# Patient Record
Sex: FEMALE | Race: WHITE | NOT HISPANIC OR LATINO | Employment: OTHER | URBAN - METROPOLITAN AREA
[De-identification: names, ages, dates, MRNs, and addresses within clinical notes are randomized per-mention and may not be internally consistent; named-entity substitution may affect disease eponyms.]

---

## 2023-03-20 LAB
COBALAMIN (VITAMIN B12) (PG/ML) IN SER/PLAS: 330 PG/ML (ref 211–911)
FERRITIN (UG/LL) IN SER/PLAS: 32 UG/L (ref 8–150)
FOLATE (NG/ML) IN SER/PLAS: 6.2 NG/ML
IRON (UG/DL) IN SER/PLAS: 70 UG/DL (ref 35–150)
IRON BINDING CAPACITY (UG/DL) IN SER/PLAS: 339 UG/DL (ref 240–445)
IRON SATURATION (%) IN SER/PLAS: 21 % (ref 25–45)

## 2023-04-20 LAB
ALANINE AMINOTRANSFERASE (SGPT) (U/L) IN SER/PLAS: 16 U/L (ref 7–45)
ALBUMIN (G/DL) IN SER/PLAS: 3.9 G/DL (ref 3.4–5)
ALKALINE PHOSPHATASE (U/L) IN SER/PLAS: 105 U/L (ref 33–136)
ANION GAP IN SER/PLAS: 12 MMOL/L (ref 10–20)
ASPARTATE AMINOTRANSFERASE (SGOT) (U/L) IN SER/PLAS: 19 U/L (ref 9–39)
BILIRUBIN TOTAL (MG/DL) IN SER/PLAS: 0.5 MG/DL (ref 0–1.2)
CALCIUM (MG/DL) IN SER/PLAS: 9.3 MG/DL (ref 8.6–10.6)
CARBON DIOXIDE, TOTAL (MMOL/L) IN SER/PLAS: 25 MMOL/L (ref 21–32)
CHLORIDE (MMOL/L) IN SER/PLAS: 107 MMOL/L (ref 98–107)
CREATININE (MG/DL) IN SER/PLAS: 1.42 MG/DL (ref 0.5–1.05)
ESTIMATED AVERAGE GLUCOSE FOR HBA1C: 192 MG/DL
GFR FEMALE: 34 ML/MIN/1.73M2
GLUCOSE (MG/DL) IN SER/PLAS: 129 MG/DL (ref 74–99)
HEMOGLOBIN A1C/HEMOGLOBIN TOTAL IN BLOOD: 8.3 %
POTASSIUM (MMOL/L) IN SER/PLAS: 4.1 MMOL/L (ref 3.5–5.3)
PROTEIN TOTAL: 6.4 G/DL (ref 6.4–8.2)
SODIUM (MMOL/L) IN SER/PLAS: 140 MMOL/L (ref 136–145)
THYROTROPIN (MIU/L) IN SER/PLAS BY DETECTION LIMIT <= 0.05 MIU/L: 4.65 MIU/L (ref 0.44–3.98)
THYROXINE (T4) FREE (NG/DL) IN SER/PLAS: 1.22 NG/DL (ref 0.78–1.48)
UREA NITROGEN (MG/DL) IN SER/PLAS: 36 MG/DL (ref 6–23)

## 2023-06-17 LAB
ERYTHROCYTE DISTRIBUTION WIDTH (RATIO) BY AUTOMATED COUNT: 13.7 % (ref 11.5–14.5)
ERYTHROCYTE MEAN CORPUSCULAR HEMOGLOBIN CONCENTRATION (G/DL) BY AUTOMATED: 31.9 G/DL (ref 32–36)
ERYTHROCYTE MEAN CORPUSCULAR VOLUME (FL) BY AUTOMATED COUNT: 95 FL (ref 80–100)
ERYTHROCYTES (10*6/UL) IN BLOOD BY AUTOMATED COUNT: 3.45 X10E12/L (ref 4–5.2)
HEMATOCRIT (%) IN BLOOD BY AUTOMATED COUNT: 32.9 % (ref 36–46)
HEMOGLOBIN (G/DL) IN BLOOD: 10.5 G/DL (ref 12–16)
INR IN PPP BY COAGULATION ASSAY: 1.3 (ref 0.9–1.1)
LEUKOCYTES (10*3/UL) IN BLOOD BY AUTOMATED COUNT: 6.2 X10E9/L (ref 4.4–11.3)
NRBC (PER 100 WBCS) BY AUTOMATED COUNT: 0 /100 WBC (ref 0–0)
PLATELETS (10*3/UL) IN BLOOD AUTOMATED COUNT: 232 X10E9/L (ref 150–450)
PROTHROMBIN TIME (PT) IN PPP BY COAGULATION ASSAY: 15.5 SEC (ref 9.8–13.4)

## 2023-09-23 PROBLEM — B35.9 TINEA: Status: ACTIVE | Noted: 2023-09-23

## 2023-09-23 PROBLEM — N17.9 ACUTE RENAL FAILURE (CMS-HCC): Status: ACTIVE | Noted: 2023-09-23

## 2023-09-23 PROBLEM — R60.0 LOWER EXTREMITY EDEMA: Status: ACTIVE | Noted: 2023-09-23

## 2023-09-23 PROBLEM — M19.131 SLAC (SCAPHOLUNATE ADVANCED COLLAPSE) OF WRIST, RIGHT: Status: ACTIVE | Noted: 2023-09-23

## 2023-09-23 PROBLEM — L57.9 SKIN CHANGES DUE TO CHRONIC EXPOSURE TO NONIONIZING RADIATION, UNSPECIFIED: Status: ACTIVE | Noted: 2023-06-08

## 2023-09-23 PROBLEM — E03.8 ADULT ONSET HYPOTHYROIDISM: Status: ACTIVE | Noted: 2023-09-23

## 2023-09-23 PROBLEM — R42 DIZZINESS: Status: ACTIVE | Noted: 2023-09-23

## 2023-09-23 PROBLEM — K80.50 CHOLEDOCHOLITHIASIS: Status: ACTIVE | Noted: 2023-09-23

## 2023-09-23 PROBLEM — L57.0 ACTINIC KERATOSIS: Status: ACTIVE | Noted: 2023-06-08

## 2023-09-23 PROBLEM — R06.02 SHORTNESS OF BREATH: Status: ACTIVE | Noted: 2023-09-23

## 2023-09-23 PROBLEM — I10 HYPERTENSION: Status: ACTIVE | Noted: 2023-09-23

## 2023-09-23 PROBLEM — D18.01 HEMANGIOMA OF SKIN AND SUBCUTANEOUS TISSUE: Status: ACTIVE | Noted: 2023-06-08

## 2023-09-23 PROBLEM — M18.11 PRIMARY OSTEOARTHRITIS OF FIRST CARPOMETACARPAL JOINT OF RIGHT HAND: Status: ACTIVE | Noted: 2023-09-23

## 2023-09-23 PROBLEM — Z85.828 PERSONAL HISTORY OF OTHER MALIGNANT NEOPLASM OF SKIN: Status: ACTIVE | Noted: 2023-06-08

## 2023-09-23 PROBLEM — R31.9 HEMATURIA: Status: ACTIVE | Noted: 2023-09-23

## 2023-09-23 PROBLEM — I48.20 CHRONIC ATRIAL FIBRILLATION (MULTI): Status: ACTIVE | Noted: 2023-09-23

## 2023-09-23 PROBLEM — L40.9 PSORIASIS: Status: ACTIVE | Noted: 2023-09-23

## 2023-09-23 PROBLEM — N28.9 RENAL/URETERAL DISEASE: Status: ACTIVE | Noted: 2023-09-23

## 2023-09-23 PROBLEM — R60.9 EDEMA: Status: ACTIVE | Noted: 2023-09-23

## 2023-09-23 PROBLEM — I25.10 ATHEROSCLEROTIC HEART DISEASE OF NATIVE CORONARY ARTERY WITHOUT ANGINA PECTORIS: Status: ACTIVE | Noted: 2023-09-23

## 2023-09-23 PROBLEM — E78.5 HYPERLIPIDEMIA: Status: ACTIVE | Noted: 2023-09-23

## 2023-09-23 PROBLEM — R31.29 HEMATURIA, MICROSCOPIC: Status: ACTIVE | Noted: 2023-09-23

## 2023-09-23 PROBLEM — R35.89 POLYURIA: Status: ACTIVE | Noted: 2023-09-23

## 2023-09-23 PROBLEM — R53.1 WEAKNESS: Status: ACTIVE | Noted: 2023-09-23

## 2023-09-23 PROBLEM — I21.4 NSTEMI (NON-ST ELEVATED MYOCARDIAL INFARCTION) (MULTI): Status: ACTIVE | Noted: 2023-09-23

## 2023-09-23 PROBLEM — R73.09 ABNORMAL GLUCOSE: Status: ACTIVE | Noted: 2023-09-23

## 2023-09-23 PROBLEM — L82.1 OTHER SEBORRHEIC KERATOSIS: Status: ACTIVE | Noted: 2023-06-08

## 2023-09-23 PROBLEM — E87.5 HYPERKALEMIA: Status: ACTIVE | Noted: 2023-09-23

## 2023-09-23 PROBLEM — E11.9 DIABETES MELLITUS (MULTI): Status: ACTIVE | Noted: 2023-09-23

## 2023-09-23 RX ORDER — AMIODARONE HYDROCHLORIDE 200 MG/1
1 TABLET ORAL DAILY
COMMUNITY
Start: 2022-04-12 | End: 2023-10-24 | Stop reason: ALTCHOICE

## 2023-09-23 RX ORDER — CLOPIDOGREL BISULFATE 75 MG/1
1 TABLET ORAL DAILY
COMMUNITY
Start: 2016-12-20 | End: 2024-01-09 | Stop reason: SDUPTHER

## 2023-09-23 RX ORDER — IBUPROFEN 600 MG/1
600 TABLET ORAL EVERY 6 HOURS PRN
COMMUNITY
Start: 2022-05-09 | End: 2023-10-24 | Stop reason: ALTCHOICE

## 2023-09-23 RX ORDER — BLOOD SUGAR DIAGNOSTIC
STRIP MISCELLANEOUS 3 TIMES DAILY
COMMUNITY
Start: 2016-05-05

## 2023-09-23 RX ORDER — GLIMEPIRIDE 1 MG/1
1 TABLET ORAL DAILY
COMMUNITY
Start: 2022-03-01 | End: 2023-10-24 | Stop reason: ALTCHOICE

## 2023-09-23 RX ORDER — AMLODIPINE BESYLATE 2.5 MG/1
1 TABLET ORAL DAILY
COMMUNITY
Start: 2021-04-14 | End: 2024-01-09 | Stop reason: SDUPTHER

## 2023-09-23 RX ORDER — NAPROXEN SODIUM 220 MG/1
81 TABLET, FILM COATED ORAL DAILY
COMMUNITY
End: 2023-10-24 | Stop reason: ALTCHOICE

## 2023-09-23 RX ORDER — AMIODARONE HYDROCHLORIDE 100 MG/1
100 TABLET ORAL DAILY
COMMUNITY
End: 2024-01-09 | Stop reason: SDUPTHER

## 2023-09-23 RX ORDER — ACETAMINOPHEN 325 MG/1
650 TABLET ORAL EVERY 4 HOURS PRN
COMMUNITY
Start: 2022-04-12 | End: 2023-12-18 | Stop reason: ALTCHOICE

## 2023-09-23 RX ORDER — CHLORHEXIDINE GLUCONATE ORAL RINSE 1.2 MG/ML
15 SOLUTION DENTAL 2 TIMES DAILY
COMMUNITY
Start: 2022-05-03 | End: 2023-10-24 | Stop reason: ALTCHOICE

## 2023-09-23 RX ORDER — OXYCODONE AND ACETAMINOPHEN 5; 325 MG/1; MG/1
1 TABLET ORAL EVERY 6 HOURS PRN
COMMUNITY
Start: 2022-05-09 | End: 2023-10-24 | Stop reason: ALTCHOICE

## 2023-09-23 RX ORDER — LANCETS
EACH MISCELLANEOUS
Status: ON HOLD | COMMUNITY
End: 2023-11-17 | Stop reason: ENTERED-IN-ERROR

## 2023-09-23 RX ORDER — ATORVASTATIN CALCIUM 80 MG/1
1 TABLET, FILM COATED ORAL NIGHTLY
COMMUNITY
Start: 2016-12-20 | End: 2024-01-09 | Stop reason: SDUPTHER

## 2023-09-23 RX ORDER — METOPROLOL SUCCINATE 25 MG/1
0.5 TABLET, EXTENDED RELEASE ORAL EVERY EVENING
COMMUNITY
Start: 2021-04-15 | End: 2023-10-30

## 2023-09-23 RX ORDER — LOSARTAN POTASSIUM 100 MG/1
0.5 TABLET ORAL 2 TIMES DAILY
COMMUNITY
Start: 2016-07-05 | End: 2024-01-09 | Stop reason: SDUPTHER

## 2023-09-23 RX ORDER — NITROGLYCERIN 0.4 MG/1
0.4 TABLET SUBLINGUAL EVERY 5 MIN PRN
COMMUNITY
Start: 2019-06-17 | End: 2023-12-18 | Stop reason: SDUPTHER

## 2023-09-23 RX ORDER — PEN NEEDLE, DIABETIC 30 GX3/16"
NEEDLE, DISPOSABLE MISCELLANEOUS
Status: ON HOLD | COMMUNITY
End: 2023-11-17 | Stop reason: ENTERED-IN-ERROR

## 2023-09-23 RX ORDER — GLIMEPIRIDE 4 MG/1
1 TABLET ORAL DAILY
COMMUNITY
End: 2023-10-24 | Stop reason: ALTCHOICE

## 2023-09-23 RX ORDER — DOCUSATE SODIUM 100 MG/1
100 CAPSULE, LIQUID FILLED ORAL 2 TIMES DAILY
COMMUNITY
Start: 2022-04-20

## 2023-09-23 RX ORDER — ISOSORBIDE MONONITRATE 60 MG/1
1.5 TABLET, EXTENDED RELEASE ORAL DAILY
Status: ON HOLD | COMMUNITY
Start: 2018-08-21 | End: 2023-12-26 | Stop reason: SDUPTHER

## 2023-09-23 RX ORDER — INSULIN DEGLUDEC 100 U/ML
5 INJECTION, SOLUTION SUBCUTANEOUS NIGHTLY
COMMUNITY
Start: 2022-05-03

## 2023-09-23 RX ORDER — FUROSEMIDE 20 MG/1
20 TABLET ORAL DAILY
COMMUNITY
Start: 2021-06-15 | End: 2024-01-09 | Stop reason: SDUPTHER

## 2023-10-23 ENCOUNTER — TELEPHONE (OUTPATIENT)
Dept: CARDIOLOGY | Facility: HOSPITAL | Age: 88
End: 2023-10-23
Payer: MEDICARE

## 2023-10-24 ENCOUNTER — OFFICE VISIT (OUTPATIENT)
Dept: CARDIOLOGY | Facility: HOSPITAL | Age: 88
End: 2023-10-24
Payer: MEDICARE

## 2023-10-24 VITALS
HEART RATE: 58 BPM | SYSTOLIC BLOOD PRESSURE: 130 MMHG | WEIGHT: 115 LBS | DIASTOLIC BLOOD PRESSURE: 70 MMHG | BODY MASS INDEX: 20.7 KG/M2

## 2023-10-24 DIAGNOSIS — I48.20 CHRONIC ATRIAL FIBRILLATION (MULTI): Primary | ICD-10-CM

## 2023-10-24 LAB
ATRIAL RATE: 58 BPM
P AXIS: 76 DEGREES
P OFFSET: 203 MS
P ONSET: 140 MS
PR INTERVAL: 160 MS
Q ONSET: 220 MS
QRS COUNT: 9 BEATS
QRS DURATION: 110 MS
QT INTERVAL: 422 MS
QTC CALCULATION(BAZETT): 414 MS
QTC FREDERICIA: 417 MS
R AXIS: 71 DEGREES
T AXIS: 99 DEGREES
T OFFSET: 431 MS
VENTRICULAR RATE: 58 BPM

## 2023-10-24 PROCEDURE — 1126F AMNT PAIN NOTED NONE PRSNT: CPT | Performed by: INTERNAL MEDICINE

## 2023-10-24 PROCEDURE — 1036F TOBACCO NON-USER: CPT | Performed by: INTERNAL MEDICINE

## 2023-10-24 PROCEDURE — 1160F RVW MEDS BY RX/DR IN RCRD: CPT | Performed by: INTERNAL MEDICINE

## 2023-10-24 PROCEDURE — 99214 OFFICE O/P EST MOD 30 MIN: CPT | Performed by: INTERNAL MEDICINE

## 2023-10-24 PROCEDURE — 3075F SYST BP GE 130 - 139MM HG: CPT | Performed by: INTERNAL MEDICINE

## 2023-10-24 PROCEDURE — 93010 ELECTROCARDIOGRAM REPORT: CPT | Performed by: INTERNAL MEDICINE

## 2023-10-24 PROCEDURE — 99214 OFFICE O/P EST MOD 30 MIN: CPT | Mod: 25 | Performed by: INTERNAL MEDICINE

## 2023-10-24 PROCEDURE — 93005 ELECTROCARDIOGRAM TRACING: CPT | Performed by: INTERNAL MEDICINE

## 2023-10-24 PROCEDURE — 1159F MED LIST DOCD IN RCRD: CPT | Performed by: INTERNAL MEDICINE

## 2023-10-24 PROCEDURE — 3078F DIAST BP <80 MM HG: CPT | Performed by: INTERNAL MEDICINE

## 2023-10-24 ASSESSMENT — ENCOUNTER SYMPTOMS
LOSS OF SENSATION IN FEET: 0
OCCASIONAL FEELINGS OF UNSTEADINESS: 0
DEPRESSION: 0

## 2023-10-24 NOTE — PROGRESS NOTES
Subjective:  Shona returns for a routine 3-month follow-up.  She is a delightful 89-year-old female whom I have been following for quite some time.  She has known coronary disease and is status post very remote CABG surgery.  Repeat cath 7 years ago showed a patent sequential LIMA with an occluded SVG-RCA.  She has been managed medically without any overly problematic angina since that time.  She has also had paroxysmal atrial fibrillation and we have been maintaining sinus rhythm predominantly with amiodarone.  She remains appropriately anticoagulated.  She remains on clopidogrel as her antiplatelet agent.  She has had a very good past 3 months.  She actually stopped sleeping in a recliner and is actually going up to bed to sleep in her bed at night now.  She is doing paining some other very enjoyable activities that she had not been doing previously.  She overall is generally quite happy with how she is doing as is her supportive daughter.    Objective:  General: Alert, pleasant elderly female who looks as good as she has in quite some time.  HEENT: Unchanged.  Lungs: Clear without crackles or wheezing.  Cardiac: Normal S1 and S2 with 2 or 6 systolic murmur.  Abdomen: Nontender.  Extremities: No edema.  Skin: No rash.  Neuro: Intact and unchanged.    EKG: Sinus bradycardia.  Incomplete left bundle branch block.  Nonspecific T wave abnormality.  No acute changes.    Impression/plan:    Shona remains clinically stable at this time.  She remains free of angina at a tolerable activity level on her current medical regimen.  She is maintaining normal sinus rhythm with amiodarone and remains appropriately anticoagulated.  She remains euvolemic despite her moderately reduced ejection fraction with moderate mitral regurgitation.  Given her clinical stability I will back her visits off to every 6 months at this point.  I elected to embark on no other testing and will not make any medication changes.  She knows to call for any  intercurrent concerns before then.  Her daughter is also very involved in her care, and I am sure will let me know if there are any concerns before her next visit.      Patient instructions:    Continue current medications unchanged.    Return to clinic in 6 months.

## 2023-10-29 DIAGNOSIS — I25.10 ATHEROSCLEROTIC HEART DISEASE OF NATIVE CORONARY ARTERY WITHOUT ANGINA PECTORIS: ICD-10-CM

## 2023-10-30 DIAGNOSIS — I48.20 CHRONIC ATRIAL FIBRILLATION (MULTI): Primary | ICD-10-CM

## 2023-10-30 RX ORDER — METOPROLOL TARTRATE 25 MG/1
12.5 TABLET, FILM COATED ORAL 2 TIMES DAILY
Qty: 90 TABLET | Refills: 3 | Status: SHIPPED | OUTPATIENT
Start: 2023-10-30 | End: 2023-10-30

## 2023-10-30 RX ORDER — METOPROLOL SUCCINATE 25 MG/1
TABLET, EXTENDED RELEASE ORAL
Qty: 45 TABLET | Refills: 3 | Status: SHIPPED | OUTPATIENT
Start: 2023-10-30 | End: 2023-11-15 | Stop reason: SDUPTHER

## 2023-11-13 ENCOUNTER — TELEPHONE (OUTPATIENT)
Dept: CARDIOLOGY | Facility: HOSPITAL | Age: 88
End: 2023-11-13
Payer: MEDICARE

## 2023-11-15 DIAGNOSIS — I48.20 CHRONIC ATRIAL FIBRILLATION (MULTI): Primary | ICD-10-CM

## 2023-11-15 RX ORDER — METOPROLOL SUCCINATE 25 MG/1
TABLET, EXTENDED RELEASE ORAL
Qty: 45 TABLET | Refills: 3 | Status: SHIPPED | OUTPATIENT
Start: 2023-11-15 | End: 2024-01-09 | Stop reason: SDUPTHER

## 2023-11-15 NOTE — TELEPHONE ENCOUNTER
Appropriate ICD code, dose and frequency verified per previous physician notes.  (Metoprolol Succinate 12.5 mg daily) Refill re routed for Dr. Mnediola's approval.

## 2023-11-16 ENCOUNTER — TELEPHONE (OUTPATIENT)
Dept: CARDIOLOGY | Facility: HOSPITAL | Age: 88
End: 2023-11-16
Payer: MEDICARE

## 2023-11-16 ENCOUNTER — HOSPITAL ENCOUNTER (OUTPATIENT)
Facility: HOSPITAL | Age: 88
Setting detail: OBSERVATION
Discharge: HOME | End: 2023-11-17
Attending: EMERGENCY MEDICINE | Admitting: INTERNAL MEDICINE
Payer: MEDICARE

## 2023-11-16 ENCOUNTER — APPOINTMENT (OUTPATIENT)
Dept: RADIOLOGY | Facility: HOSPITAL | Age: 88
End: 2023-11-16
Payer: MEDICARE

## 2023-11-16 DIAGNOSIS — R07.9 CHEST PAIN, UNSPECIFIED TYPE: Primary | ICD-10-CM

## 2023-11-16 LAB
ALBUMIN SERPL BCP-MCNC: 4.2 G/DL (ref 3.4–5)
ALP SERPL-CCNC: 106 U/L (ref 33–136)
ALT SERPL W P-5'-P-CCNC: 21 U/L (ref 7–45)
ANION GAP SERPL CALC-SCNC: 13 MMOL/L (ref 10–20)
AST SERPL W P-5'-P-CCNC: 22 U/L (ref 9–39)
BASOPHILS # BLD AUTO: 0.03 X10*3/UL (ref 0–0.1)
BASOPHILS NFR BLD AUTO: 0.5 %
BILIRUB SERPL-MCNC: 0.7 MG/DL (ref 0–1.2)
BNP SERPL-MCNC: 378 PG/ML (ref 0–99)
BUN SERPL-MCNC: 33 MG/DL (ref 6–23)
CALCIUM SERPL-MCNC: 10 MG/DL (ref 8.6–10.3)
CARDIAC TROPONIN I PNL SERPL HS: 29 NG/L (ref 0–13)
CARDIAC TROPONIN I PNL SERPL HS: 29 NG/L (ref 0–13)
CHLORIDE SERPL-SCNC: 103 MMOL/L (ref 98–107)
CO2 SERPL-SCNC: 27 MMOL/L (ref 21–32)
CREAT SERPL-MCNC: 1.47 MG/DL (ref 0.5–1.05)
EOSINOPHIL # BLD AUTO: 0.09 X10*3/UL (ref 0–0.4)
EOSINOPHIL NFR BLD AUTO: 1.5 %
ERYTHROCYTE [DISTWIDTH] IN BLOOD BY AUTOMATED COUNT: 13.3 % (ref 11.5–14.5)
GFR SERPL CREATININE-BSD FRML MDRD: 33 ML/MIN/1.73M*2
GLUCOSE BLD MANUAL STRIP-MCNC: 184 MG/DL (ref 74–99)
GLUCOSE BLD MANUAL STRIP-MCNC: 360 MG/DL (ref 74–99)
GLUCOSE SERPL-MCNC: 130 MG/DL (ref 74–99)
HCT VFR BLD AUTO: 37.6 % (ref 36–46)
HGB BLD-MCNC: 12.1 G/DL (ref 12–16)
IMM GRANULOCYTES # BLD AUTO: 0.07 X10*3/UL (ref 0–0.5)
IMM GRANULOCYTES NFR BLD AUTO: 1.2 % (ref 0–0.9)
LYMPHOCYTES # BLD AUTO: 1.5 X10*3/UL (ref 0.8–3)
LYMPHOCYTES NFR BLD AUTO: 25.6 %
MAGNESIUM SERPL-MCNC: 2 MG/DL (ref 1.6–2.4)
MCH RBC QN AUTO: 30.2 PG (ref 26–34)
MCHC RBC AUTO-ENTMCNC: 32.2 G/DL (ref 32–36)
MCV RBC AUTO: 94 FL (ref 80–100)
MONOCYTES # BLD AUTO: 0.42 X10*3/UL (ref 0.05–0.8)
MONOCYTES NFR BLD AUTO: 7.2 %
NEUTROPHILS # BLD AUTO: 3.75 X10*3/UL (ref 1.6–5.5)
NEUTROPHILS NFR BLD AUTO: 64 %
NRBC BLD-RTO: 0 /100 WBCS (ref 0–0)
PLATELET # BLD AUTO: 226 X10*3/UL (ref 150–450)
POTASSIUM SERPL-SCNC: 4.1 MMOL/L (ref 3.5–5.3)
PROT SERPL-MCNC: 7.4 G/DL (ref 6.4–8.2)
RBC # BLD AUTO: 4.01 X10*6/UL (ref 4–5.2)
SODIUM SERPL-SCNC: 139 MMOL/L (ref 136–145)
WBC # BLD AUTO: 5.9 X10*3/UL (ref 4.4–11.3)

## 2023-11-16 PROCEDURE — 99285 EMERGENCY DEPT VISIT HI MDM: CPT | Mod: 25 | Performed by: EMERGENCY MEDICINE

## 2023-11-16 PROCEDURE — 71046 X-RAY EXAM CHEST 2 VIEWS: CPT | Performed by: RADIOLOGY

## 2023-11-16 PROCEDURE — 84484 ASSAY OF TROPONIN QUANT: CPT | Performed by: EMERGENCY MEDICINE

## 2023-11-16 PROCEDURE — 36415 COLL VENOUS BLD VENIPUNCTURE: CPT | Performed by: EMERGENCY MEDICINE

## 2023-11-16 PROCEDURE — 80053 COMPREHEN METABOLIC PANEL: CPT | Performed by: EMERGENCY MEDICINE

## 2023-11-16 PROCEDURE — 99222 1ST HOSP IP/OBS MODERATE 55: CPT | Performed by: PHYSICIAN ASSISTANT

## 2023-11-16 PROCEDURE — 2500000001 HC RX 250 WO HCPCS SELF ADMINISTERED DRUGS (ALT 637 FOR MEDICARE OP): Performed by: NURSE PRACTITIONER

## 2023-11-16 PROCEDURE — 85025 COMPLETE CBC W/AUTO DIFF WBC: CPT | Performed by: EMERGENCY MEDICINE

## 2023-11-16 PROCEDURE — 2500000002 HC RX 250 W HCPCS SELF ADMINISTERED DRUGS (ALT 637 FOR MEDICARE OP, ALT 636 FOR OP/ED): Performed by: PHYSICIAN ASSISTANT

## 2023-11-16 PROCEDURE — 83880 ASSAY OF NATRIURETIC PEPTIDE: CPT | Performed by: PHYSICIAN ASSISTANT

## 2023-11-16 PROCEDURE — 2500000001 HC RX 250 WO HCPCS SELF ADMINISTERED DRUGS (ALT 637 FOR MEDICARE OP): Performed by: PHYSICIAN ASSISTANT

## 2023-11-16 PROCEDURE — 82947 ASSAY GLUCOSE BLOOD QUANT: CPT | Mod: 59

## 2023-11-16 PROCEDURE — G0378 HOSPITAL OBSERVATION PER HR: HCPCS

## 2023-11-16 PROCEDURE — 83735 ASSAY OF MAGNESIUM: CPT | Performed by: EMERGENCY MEDICINE

## 2023-11-16 PROCEDURE — 71046 X-RAY EXAM CHEST 2 VIEWS: CPT

## 2023-11-16 RX ORDER — FUROSEMIDE 40 MG/1
20 TABLET ORAL DAILY
Status: DISCONTINUED | OUTPATIENT
Start: 2023-11-17 | End: 2023-11-17 | Stop reason: HOSPADM

## 2023-11-16 RX ORDER — ACETAMINOPHEN 160 MG/5ML
650 SOLUTION ORAL EVERY 4 HOURS PRN
Status: DISCONTINUED | OUTPATIENT
Start: 2023-11-16 | End: 2023-11-17 | Stop reason: HOSPADM

## 2023-11-16 RX ORDER — INSULIN LISPRO 100 [IU]/ML
0-5 INJECTION, SOLUTION INTRAVENOUS; SUBCUTANEOUS
Status: DISCONTINUED | OUTPATIENT
Start: 2023-11-17 | End: 2023-11-17 | Stop reason: HOSPADM

## 2023-11-16 RX ORDER — DEXTROSE MONOHYDRATE 100 MG/ML
0.3 INJECTION, SOLUTION INTRAVENOUS ONCE AS NEEDED
Status: DISCONTINUED | OUTPATIENT
Start: 2023-11-16 | End: 2023-11-17 | Stop reason: HOSPADM

## 2023-11-16 RX ORDER — ISOSORBIDE MONONITRATE 30 MG/1
90 TABLET, EXTENDED RELEASE ORAL DAILY
Status: DISCONTINUED | OUTPATIENT
Start: 2023-11-17 | End: 2023-11-17 | Stop reason: HOSPADM

## 2023-11-16 RX ORDER — DEXTROSE 50 % IN WATER (D50W) INTRAVENOUS SYRINGE
25
Status: DISCONTINUED | OUTPATIENT
Start: 2023-11-16 | End: 2023-11-17 | Stop reason: HOSPADM

## 2023-11-16 RX ORDER — POLYETHYLENE GLYCOL 3350 17 G/17G
17 POWDER, FOR SOLUTION ORAL DAILY
Status: DISCONTINUED | OUTPATIENT
Start: 2023-11-17 | End: 2023-11-17 | Stop reason: HOSPADM

## 2023-11-16 RX ORDER — ONDANSETRON HYDROCHLORIDE 2 MG/ML
4 INJECTION, SOLUTION INTRAVENOUS EVERY 8 HOURS PRN
Status: DISCONTINUED | OUTPATIENT
Start: 2023-11-16 | End: 2023-11-17 | Stop reason: HOSPADM

## 2023-11-16 RX ORDER — AMIODARONE HYDROCHLORIDE 200 MG/1
100 TABLET ORAL DAILY
Status: DISCONTINUED | OUTPATIENT
Start: 2023-11-17 | End: 2023-11-17 | Stop reason: HOSPADM

## 2023-11-16 RX ORDER — INSULIN DEGLUDEC 100 U/ML
6 INJECTION, SOLUTION SUBCUTANEOUS EVERY 24 HOURS
Status: DISCONTINUED | OUTPATIENT
Start: 2023-11-16 | End: 2023-11-16

## 2023-11-16 RX ORDER — PANTOPRAZOLE SODIUM 40 MG/1
40 TABLET, DELAYED RELEASE ORAL
Status: DISCONTINUED | OUTPATIENT
Start: 2023-11-17 | End: 2023-11-17 | Stop reason: HOSPADM

## 2023-11-16 RX ORDER — CLOPIDOGREL BISULFATE 75 MG/1
75 TABLET ORAL DAILY
Status: DISCONTINUED | OUTPATIENT
Start: 2023-11-17 | End: 2023-11-17 | Stop reason: HOSPADM

## 2023-11-16 RX ORDER — AMLODIPINE BESYLATE 2.5 MG/1
2.5 TABLET ORAL DAILY
Status: DISCONTINUED | OUTPATIENT
Start: 2023-11-17 | End: 2023-11-17 | Stop reason: HOSPADM

## 2023-11-16 RX ORDER — METOPROLOL SUCCINATE 25 MG/1
12.5 TABLET, EXTENDED RELEASE ORAL ONCE
Status: COMPLETED | OUTPATIENT
Start: 2023-11-16 | End: 2023-11-16

## 2023-11-16 RX ORDER — LOSARTAN POTASSIUM 50 MG/1
50 TABLET ORAL 2 TIMES DAILY
Status: DISCONTINUED | OUTPATIENT
Start: 2023-11-16 | End: 2023-11-17 | Stop reason: HOSPADM

## 2023-11-16 RX ORDER — PANTOPRAZOLE SODIUM 40 MG/10ML
40 INJECTION, POWDER, LYOPHILIZED, FOR SOLUTION INTRAVENOUS
Status: DISCONTINUED | OUTPATIENT
Start: 2023-11-17 | End: 2023-11-17 | Stop reason: HOSPADM

## 2023-11-16 RX ORDER — ATORVASTATIN CALCIUM 80 MG/1
80 TABLET, FILM COATED ORAL NIGHTLY
Status: DISCONTINUED | OUTPATIENT
Start: 2023-11-16 | End: 2023-11-17 | Stop reason: HOSPADM

## 2023-11-16 RX ORDER — ACETAMINOPHEN 325 MG/1
650 TABLET ORAL EVERY 4 HOURS PRN
Status: DISCONTINUED | OUTPATIENT
Start: 2023-11-16 | End: 2023-11-17 | Stop reason: HOSPADM

## 2023-11-16 RX ORDER — ACETAMINOPHEN 650 MG/1
650 SUPPOSITORY RECTAL EVERY 4 HOURS PRN
Status: DISCONTINUED | OUTPATIENT
Start: 2023-11-16 | End: 2023-11-17 | Stop reason: HOSPADM

## 2023-11-16 RX ORDER — ONDANSETRON 4 MG/1
4 TABLET, ORALLY DISINTEGRATING ORAL EVERY 8 HOURS PRN
Status: DISCONTINUED | OUTPATIENT
Start: 2023-11-16 | End: 2023-11-17 | Stop reason: HOSPADM

## 2023-11-16 RX ORDER — NITROGLYCERIN 0.4 MG/1
0.4 TABLET SUBLINGUAL EVERY 5 MIN PRN
Status: DISCONTINUED | OUTPATIENT
Start: 2023-11-16 | End: 2023-11-17 | Stop reason: HOSPADM

## 2023-11-16 RX ORDER — INSULIN GLARGINE 100 [IU]/ML
6 INJECTION, SOLUTION SUBCUTANEOUS NIGHTLY
Status: DISCONTINUED | OUTPATIENT
Start: 2023-11-16 | End: 2023-11-17 | Stop reason: HOSPADM

## 2023-11-16 RX ORDER — METOPROLOL SUCCINATE 25 MG/1
12.5 TABLET, EXTENDED RELEASE ORAL 2 TIMES DAILY
Status: DISCONTINUED | OUTPATIENT
Start: 2023-11-16 | End: 2023-11-17 | Stop reason: HOSPADM

## 2023-11-16 RX ORDER — PREDNISOLONE ACETATE 10 MG/ML
1 SUSPENSION/ DROPS OPHTHALMIC NIGHTLY
Status: DISCONTINUED | OUTPATIENT
Start: 2023-11-16 | End: 2023-11-17 | Stop reason: HOSPADM

## 2023-11-16 RX ADMIN — APIXABAN 2.5 MG: 5 TABLET, FILM COATED ORAL at 21:57

## 2023-11-16 RX ADMIN — METOPROLOL SUCCINATE 12.5 MG: 25 TABLET, EXTENDED RELEASE ORAL at 22:44

## 2023-11-16 RX ADMIN — ATORVASTATIN CALCIUM 80 MG: 80 TABLET, FILM COATED ORAL at 21:57

## 2023-11-16 RX ADMIN — INSULIN GLARGINE 6 UNITS: 100 INJECTION, SOLUTION SUBCUTANEOUS at 22:43

## 2023-11-16 RX ADMIN — PREDNISOLONE ACETATE 1 DROP: 10 SUSPENSION/ DROPS OPHTHALMIC at 22:43

## 2023-11-16 ASSESSMENT — ENCOUNTER SYMPTOMS
NUMBNESS: 1
CHILLS: 0
PHOTOPHOBIA: 1
CONFUSION: 0
HEADACHES: 0
VOMITING: 0
LIGHT-HEADEDNESS: 0
NAUSEA: 0
SORE THROAT: 0
PALPITATIONS: 0
SPEECH DIFFICULTY: 0
DIZZINESS: 0
FEVER: 0
SHORTNESS OF BREATH: 0
WHEEZING: 0
WEAKNESS: 0
CHEST TIGHTNESS: 0
AGITATION: 0

## 2023-11-16 ASSESSMENT — COLUMBIA-SUICIDE SEVERITY RATING SCALE - C-SSRS
2. HAVE YOU ACTUALLY HAD ANY THOUGHTS OF KILLING YOURSELF?: NO
6. HAVE YOU EVER DONE ANYTHING, STARTED TO DO ANYTHING, OR PREPARED TO DO ANYTHING TO END YOUR LIFE?: NO
1. IN THE PAST MONTH, HAVE YOU WISHED YOU WERE DEAD OR WISHED YOU COULD GO TO SLEEP AND NOT WAKE UP?: NO

## 2023-11-16 NOTE — ED PROVIDER NOTES
HPI   Chief Complaint   Patient presents with    Chest Pain     Went to eye doctor today to get right cataract removed.  While there to day @ around 130pm she developed chest pain and irregular HR.  Has had A-fib in the past.  Other than skipping her blood thinners (eliquis) today took all her normal medications. Her right eye is very dilated, she got the drop in her right eyue before coming to the ED.       HPI  Patient is a 89-year-old female with past medical history significant for an MI status post CABG in , atrial fibrillation currently on Eliquis and Plavix who presents emergency room for chest pain.  Patient was awaiting cataract surgery today when she developed chest pressure radiating to her left arm.  She did not take her nitroglycerin pills because they are  as she has not experienced pain in some time.  She has not undergone cardiac studies in a few years as well.  She states that at the time her heart rate was elevated and she had a blood pressure 189/72.  The pain lasted a little over an hour and resolved here in the emergency room without intervention.  She currently does not have any symptoms.  She did not take her Eliquis or Plavix today because of the surgery.  She denies any other symptoms including nausea, vomiting, diaphoresis, fever, chills, dizziness, lightheadedness or syncope.      PMHx: As above  PSHx: As above  FamilyHx: Denies pertinent  SocialHx: Denies  Allergies: Amoxicillin, tramadol  Medications: See Medication Reconciliation     ROS  As above but otherwise denies      Physical Exam    GENERAL: Awake and Alert, No Acute Distress  HEENT: AT/NC, PERRL, EOMI, Normal Oropharynx, No Signs of Dehydration  NECK: Normal Inspection, No JVD  CARDIOVASCULAR: RRR, No M/R/G  RESPIRATORY: CTA Bilaterally, No Wheezes, Rales or Rhonchi, Chest Wall Non-tender  ABDOMEN: Soft, non-tender abdomen, Normal Bowel Sounds, No Distention  BACK: No CVA Tenderness  SKIN: Normal Color, Warm, Dry, No  Rashes   EXTREMITIES: Non-Tender, Full ROM, No Pedal Edema  NEURO: A&O x 3, Normal Motor and Sensation, Normal Mood and Affect    Nursing Assessment and Vitals Reviewed    EKG showed a sinus rhythm at 72 bpm.  There is a sinus arrhythmia.  No ischemic ST changes.  There is right axis deviation.  No ischemic T wave inversions.    Medical Decision  Patient is seen and evaluated for chest discomfort in the setting of prior MI status post CABG remotely.  On arrival she is feeling improved and her pain has resolved after over an hour without intervention.  She is well-appearing and in no acute distress.  Lungs are clear and heart is regular rate and rhythm.  EKG without ischemic ST changes.  Work-up thus far shows an elevated BUN and creatinine similar to prior.  She has hyperglycemia without signs of acidosis.  Troponin is slightly elevated at 29.  Delta troponin currently pending.  Chest x-ray shows left basilar atelectasis or fibrosis with a possible associated small effusion.  Patient has remained in stable condition while in the ED.  She was initially tachycardic which resolved without intervention as well.  She is saturating well on room air and has no respiratory complaints.  Will admit for observation as well as cardiac evaluation.                                No data recorded                Patient History   Past Medical History:   Diagnosis Date    Atherosclerotic heart disease of native coronary artery without angina pectoris 04/20/2022    Atherosclerotic heart disease of native coronary artery without angina pectoris    Personal history of other diseases of the circulatory system 09/16/2013    History of hypertension     Past Surgical History:   Procedure Laterality Date    CORONARY ARTERY BYPASS GRAFT  08/08/2016    CABG    TOTAL HIP ARTHROPLASTY  08/28/2014    Total Hip Replacement     Family History   Problem Relation Name Age of Onset    No Known Problems Mother      No Known Problems Father        Social History     Tobacco Use    Smoking status: Never    Smokeless tobacco: Never   Substance Use Topics    Alcohol use: Not on file    Drug use: Not on file       Physical Exam   ED Triage Vitals [11/16/23 1442]   Temp Heart Rate Resp BP   36.9 °C (98.4 °F) (!) 112 18 (!) 167/91      SpO2 Temp Source Heart Rate Source Patient Position   97 % Tympanic -- --      BP Location FiO2 (%)     -- --       Physical Exam    ED Course & MDM        Medical Decision Making      Procedure  Procedures     Lexie Madrid MD  11/16/23 5418

## 2023-11-16 NOTE — H&P
History Of Present Illness  Shona Montgomery is a 89 y.o. female PMHx as below presenting with episode of chest pressure radiating down the left arm x 1.5 hours this morning. Patient was at ophthalmology office for cataract procedure when she noticed midsternal chest pressure that radiated down the right arm. Not associated with neck/jaw pain, sob, dizziness, nausea, diaphoresis. Denies tachycardia or palpitations. She reports that symptoms persisted for about 1.5 hours without changing, and are now resolved. She tells me that she was fasting today because of her procedure and had not eaten any breakfast or lunch. She was not drinking water today. She did take her morning blood pressure medications but has been holding eliquis and plavix for procedure. Daughters both at bedside add to history when appropriate.     PMH: HTN, longstanding persistent A-fib, hyperlipidemia, CKD stage III, anemia, CHF, type 2 diabetes, heart murmur, incomplete LBBB    Past Medical History  She has a past medical history of Atherosclerotic heart disease of native coronary artery without angina pectoris (04/20/2022) and Personal history of other diseases of the circulatory system (09/16/2013).    Surgical History  She has a past surgical history that includes Coronary artery bypass graft (08/08/2016) and Total hip arthroplasty (08/28/2014).     Social History  She reports that she has never smoked. She has never used smokeless tobacco. No history on file for alcohol use and drug use.    Family History  Family History   Problem Relation Name Age of Onset    No Known Problems Mother      No Known Problems Father          Allergies  Amoxicillin and Tramadol    Review of Systems   Constitutional:  Negative for chills and fever.   HENT:  Negative for congestion and sore throat.    Eyes:  Positive for photophobia (one eye dilated) and visual disturbance.   Respiratory:  Negative for chest tightness, shortness of breath and wheezing.     Cardiovascular:  Positive for chest pain. Negative for palpitations and leg swelling.   Gastrointestinal:  Negative for nausea and vomiting.   Neurological:  Positive for numbness (left arm). Negative for dizziness, syncope, speech difficulty, weakness, light-headedness and headaches.   Psychiatric/Behavioral:  Negative for agitation and confusion.         Physical Exam  Constitutional:       General: She is not in acute distress.     Appearance: She is not toxic-appearing.   HENT:      Head: Normocephalic and atraumatic.      Right Ear: External ear normal.      Left Ear: External ear normal.      Nose: Nose normal. No congestion.      Mouth/Throat:      Mouth: Mucous membranes are moist.      Pharynx: Oropharynx is clear.   Eyes:      General:         Right eye: No discharge.         Left eye: No discharge.      Conjunctiva/sclera: Conjunctivae normal.      Comments: Right pupil dilated, left pupil ERRL   Cardiovascular:      Rate and Rhythm: Normal rate and regular rhythm.      Heart sounds: No murmur heard.     No gallop.   Pulmonary:      Effort: Pulmonary effort is normal.      Breath sounds: No wheezing or rales.   Abdominal:      General: Abdomen is flat. Bowel sounds are normal.      Palpations: Abdomen is soft.      Tenderness: There is no abdominal tenderness. There is no guarding or rebound.   Musculoskeletal:         General: No swelling or tenderness. Normal range of motion.      Right lower leg: No edema.      Left lower leg: No edema.   Skin:     General: Skin is warm and dry.      Findings: No erythema or rash.   Neurological:      General: No focal deficit present.      Mental Status: She is alert.      Cranial Nerves: No cranial nerve deficit.      Motor: No weakness.   Psychiatric:         Mood and Affect: Mood normal.         Thought Content: Thought content normal.          Last Recorded Vitals  BP (!) 167/91   Pulse (!) 112   Temp 36.9 °C (98.4 °F) (Tympanic)   Resp 18   Wt 51.3 kg (113  lb)   SpO2 97%     Relevant Results  Results for orders placed or performed during the hospital encounter of 11/16/23 (from the past 24 hour(s))   CBC and Auto Differential   Result Value Ref Range    WBC 5.9 4.4 - 11.3 x10*3/uL    nRBC 0.0 0.0 - 0.0 /100 WBCs    RBC 4.01 4.00 - 5.20 x10*6/uL    Hemoglobin 12.1 12.0 - 16.0 g/dL    Hematocrit 37.6 36.0 - 46.0 %    MCV 94 80 - 100 fL    MCH 30.2 26.0 - 34.0 pg    MCHC 32.2 32.0 - 36.0 g/dL    RDW 13.3 11.5 - 14.5 %    Platelets 226 150 - 450 x10*3/uL    Neutrophils % 64.0 40.0 - 80.0 %    Immature Granulocytes %, Automated 1.2 (H) 0.0 - 0.9 %    Lymphocytes % 25.6 13.0 - 44.0 %    Monocytes % 7.2 2.0 - 10.0 %    Eosinophils % 1.5 0.0 - 6.0 %    Basophils % 0.5 0.0 - 2.0 %    Neutrophils Absolute 3.75 1.60 - 5.50 x10*3/uL    Immature Granulocytes Absolute, Automated 0.07 0.00 - 0.50 x10*3/uL    Lymphocytes Absolute 1.50 0.80 - 3.00 x10*3/uL    Monocytes Absolute 0.42 0.05 - 0.80 x10*3/uL    Eosinophils Absolute 0.09 0.00 - 0.40 x10*3/uL    Basophils Absolute 0.03 0.00 - 0.10 x10*3/uL   Comprehensive metabolic panel   Result Value Ref Range    Glucose 130 (H) 74 - 99 mg/dL    Sodium 139 136 - 145 mmol/L    Potassium 4.1 3.5 - 5.3 mmol/L    Chloride 103 98 - 107 mmol/L    Bicarbonate 27 21 - 32 mmol/L    Anion Gap 13 10 - 20 mmol/L    Urea Nitrogen 33 (H) 6 - 23 mg/dL    Creatinine 1.47 (H) 0.50 - 1.05 mg/dL    eGFR 33 (L) >60 mL/min/1.73m*2    Calcium 10.0 8.6 - 10.3 mg/dL    Albumin 4.2 3.4 - 5.0 g/dL    Alkaline Phosphatase 106 33 - 136 U/L    Total Protein 7.4 6.4 - 8.2 g/dL    AST 22 9 - 39 U/L    Bilirubin, Total 0.7 0.0 - 1.2 mg/dL    ALT 21 7 - 45 U/L   Magnesium   Result Value Ref Range    Magnesium 2.00 1.60 - 2.40 mg/dL   Troponin I, High Sensitivity   Result Value Ref Range    Troponin I, High Sensitivity 29 (H) 0 - 13 ng/L      XR chest 2 views    Result Date: 11/16/2023  Interpreted By:  Tima Ramos, STUDY: XR CHEST 2 VIEWS;  11/16/2023 4:48 pm    INDICATION: Signs/Symptoms:chest pain.   COMPARISON: 04/05/2022   ACCESSION NUMBER(S): [id number]   ORDERING CLINICIAN: CARRIE MORELAND   FINDINGS: CARDIOMEDIASTINAL SILHOUETTE AND VASCULATURE:   Cardiac size:  Within normal limits. Status post median sternotomy.   Aortic shadow:  Within normal limits.   Mediastinal contours: Within normal limits.   Pulmonary vasculature:  The central vasculature is unremarkable   LUNGS: Left basilar discoid atelectasis and/or fibrosis. There is slight blunting of the costophrenic angles posteriorly on the lateral projection also probably due to atelectasis or fibrosis although a small effusion is possible.   ABDOMEN AND OTHER FINDINGS: No remarkable upper abdominal findings.   BONES: No acute osseous changes.       1.  Left basilar atelectasis or fibrosis, possibly with a small effusion.   Signed by: Tima Ramos 11/16/2023 5:01 PM Dictation workstation:   [id number]    ECG 12 lead (Clinic Performed)    Result Date: 10/24/2023  Sinus bradycardia Incomplete left bundle branch block Nonspecific T wave abnormality Abnormal ECG Confirmed by Mohsen Mendiola ([id number]) on 10/24/2023 7:17:54 PM       Assessment/Plan     PMH: HTN, longstanding persistent A-fib, hyperlipidemia, CKD stage III, anemia, CHF, type 2 diabetes, heart murmur, incomplete LBBB    Active Problems:  There are no active Hospital Problems.  Chest pain   CAD s/p CABG  CHF  HTN  Paroxysmal/persistent afib  - EKG NSR with PACs, nonspecific T wave abnormalities lateral leads  - CXR left basilar atelectasis or fibrosis, possibly with a small effusion.  - trop 29, delta pending   - BNP pending   - mg 2.0  - K 4.1   - maintain mg >2 and K>4, replace as needed  - cbc - grossly wnl   - prn nitroglycerin (didn't have at home, needs new script)   - telemetry   - continue home meds    - lasix 20 mg Monday and Friday  - considering elevated trop, HEART score 6 will consult cardiology    - follows with   Marlena    CKD III  - Cr 1.47, at baseline  - monitor BMP    Dvt prophylaxis   - lovenox deferred, continue home eliquis and plavix   - scd/ambulate     Gi prophylaxis   - pantopraxole   - miralax     DC plan  - DC home when medically stable    Labs/Testing reviewed:   45 min spent on professional & overall care of this patient   NICOLE StallingsC

## 2023-11-17 ENCOUNTER — TELEPHONE (OUTPATIENT)
Dept: ENDOCRINOLOGY | Facility: CLINIC | Age: 88
End: 2023-11-17
Payer: MEDICARE

## 2023-11-17 VITALS
WEIGHT: 113 LBS | HEIGHT: 63 IN | TEMPERATURE: 97.4 F | RESPIRATION RATE: 18 BRPM | OXYGEN SATURATION: 96 % | HEART RATE: 50 BPM | BODY MASS INDEX: 20.02 KG/M2 | DIASTOLIC BLOOD PRESSURE: 54 MMHG | SYSTOLIC BLOOD PRESSURE: 139 MMHG

## 2023-11-17 LAB
ANION GAP SERPL CALC-SCNC: 12 MMOL/L (ref 10–20)
BUN SERPL-MCNC: 32 MG/DL (ref 6–23)
CALCIUM SERPL-MCNC: 8.8 MG/DL (ref 8.6–10.3)
CARDIAC TROPONIN I PNL SERPL HS: 32 NG/L (ref 0–13)
CHLORIDE SERPL-SCNC: 105 MMOL/L (ref 98–107)
CO2 SERPL-SCNC: 25 MMOL/L (ref 21–32)
CREAT SERPL-MCNC: 1.29 MG/DL (ref 0.5–1.05)
ERYTHROCYTE [DISTWIDTH] IN BLOOD BY AUTOMATED COUNT: 13.3 % (ref 11.5–14.5)
GFR SERPL CREATININE-BSD FRML MDRD: 39 ML/MIN/1.73M*2
GLUCOSE BLD MANUAL STRIP-MCNC: 143 MG/DL (ref 74–99)
GLUCOSE BLD MANUAL STRIP-MCNC: 156 MG/DL (ref 74–99)
GLUCOSE BLD MANUAL STRIP-MCNC: 157 MG/DL (ref 74–99)
GLUCOSE BLD MANUAL STRIP-MCNC: 410 MG/DL (ref 74–99)
GLUCOSE SERPL-MCNC: 185 MG/DL (ref 74–99)
HCT VFR BLD AUTO: 33.7 % (ref 36–46)
HGB BLD-MCNC: 10.9 G/DL (ref 12–16)
MAGNESIUM SERPL-MCNC: 2.2 MG/DL (ref 1.6–2.4)
MCH RBC QN AUTO: 29.9 PG (ref 26–34)
MCHC RBC AUTO-ENTMCNC: 32.3 G/DL (ref 32–36)
MCV RBC AUTO: 92 FL (ref 80–100)
NRBC BLD-RTO: 0 /100 WBCS (ref 0–0)
PLATELET # BLD AUTO: 204 X10*3/UL (ref 150–450)
POTASSIUM SERPL-SCNC: 3.9 MMOL/L (ref 3.5–5.3)
RBC # BLD AUTO: 3.65 X10*6/UL (ref 4–5.2)
SODIUM SERPL-SCNC: 138 MMOL/L (ref 136–145)
WBC # BLD AUTO: 4.8 X10*3/UL (ref 4.4–11.3)

## 2023-11-17 PROCEDURE — 99239 HOSP IP/OBS DSCHRG MGMT >30: CPT | Performed by: PHYSICIAN ASSISTANT

## 2023-11-17 PROCEDURE — 2500000002 HC RX 250 W HCPCS SELF ADMINISTERED DRUGS (ALT 637 FOR MEDICARE OP, ALT 636 FOR OP/ED): Performed by: PHYSICIAN ASSISTANT

## 2023-11-17 PROCEDURE — 99223 1ST HOSP IP/OBS HIGH 75: CPT | Performed by: STUDENT IN AN ORGANIZED HEALTH CARE EDUCATION/TRAINING PROGRAM

## 2023-11-17 PROCEDURE — 85027 COMPLETE CBC AUTOMATED: CPT | Performed by: PHYSICIAN ASSISTANT

## 2023-11-17 PROCEDURE — 80048 BASIC METABOLIC PNL TOTAL CA: CPT | Performed by: PHYSICIAN ASSISTANT

## 2023-11-17 PROCEDURE — 82947 ASSAY GLUCOSE BLOOD QUANT: CPT

## 2023-11-17 PROCEDURE — G0378 HOSPITAL OBSERVATION PER HR: HCPCS

## 2023-11-17 PROCEDURE — 36415 COLL VENOUS BLD VENIPUNCTURE: CPT | Performed by: PHYSICIAN ASSISTANT

## 2023-11-17 PROCEDURE — 84484 ASSAY OF TROPONIN QUANT: CPT | Performed by: PHYSICIAN ASSISTANT

## 2023-11-17 PROCEDURE — 2500000004 HC RX 250 GENERAL PHARMACY W/ HCPCS (ALT 636 FOR OP/ED): Performed by: NURSE PRACTITIONER

## 2023-11-17 PROCEDURE — 83735 ASSAY OF MAGNESIUM: CPT | Performed by: NURSE PRACTITIONER

## 2023-11-17 PROCEDURE — 2500000001 HC RX 250 WO HCPCS SELF ADMINISTERED DRUGS (ALT 637 FOR MEDICARE OP): Performed by: PHYSICIAN ASSISTANT

## 2023-11-17 RX ORDER — UBIDECARENONE 75 MG
500 CAPSULE ORAL DAILY
COMMUNITY

## 2023-11-17 RX ORDER — MOXIFLOXACIN 5 MG/ML
1 SOLUTION/ DROPS OPHTHALMIC 3 TIMES DAILY
COMMUNITY
Start: 2023-10-23 | End: 2023-12-18 | Stop reason: ALTCHOICE

## 2023-11-17 RX ORDER — POTASSIUM CHLORIDE 20 MEQ/1
20 TABLET, EXTENDED RELEASE ORAL ONCE
Status: COMPLETED | OUTPATIENT
Start: 2023-11-17 | End: 2023-11-17

## 2023-11-17 RX ORDER — TIMOLOL MALEATE 5 MG/ML
1 SOLUTION/ DROPS OPHTHALMIC 2 TIMES DAILY
COMMUNITY
Start: 2023-10-26 | End: 2024-01-09 | Stop reason: ALTCHOICE

## 2023-11-17 RX ADMIN — METOPROLOL SUCCINATE 12.5 MG: 25 TABLET, EXTENDED RELEASE ORAL at 09:01

## 2023-11-17 RX ADMIN — AMIODARONE HYDROCHLORIDE 100 MG: 200 TABLET ORAL at 09:01

## 2023-11-17 RX ADMIN — FUROSEMIDE 20 MG: 40 TABLET ORAL at 09:01

## 2023-11-17 RX ADMIN — INSULIN LISPRO 5 UNITS: 100 INJECTION, SOLUTION INTRAVENOUS; SUBCUTANEOUS at 12:31

## 2023-11-17 RX ADMIN — APIXABAN 2.5 MG: 5 TABLET, FILM COATED ORAL at 09:01

## 2023-11-17 RX ADMIN — POLYETHYLENE GLYCOL 3350 17 G: 17 POWDER, FOR SOLUTION ORAL at 09:01

## 2023-11-17 RX ADMIN — LOSARTAN POTASSIUM 50 MG: 50 TABLET, FILM COATED ORAL at 10:04

## 2023-11-17 RX ADMIN — AMLODIPINE BESYLATE 2.5 MG: 2.5 TABLET ORAL at 09:01

## 2023-11-17 RX ADMIN — CLOPIDOGREL BISULFATE 75 MG: 75 TABLET ORAL at 09:01

## 2023-11-17 RX ADMIN — POTASSIUM CHLORIDE 20 MEQ: 1500 TABLET, EXTENDED RELEASE ORAL at 09:01

## 2023-11-17 RX ADMIN — PANTOPRAZOLE SODIUM 40 MG: 40 TABLET, DELAYED RELEASE ORAL at 06:50

## 2023-11-17 SDOH — SOCIAL STABILITY: SOCIAL INSECURITY: ARE THERE ANY APPARENT SIGNS OF INJURIES/BEHAVIORS THAT COULD BE RELATED TO ABUSE/NEGLECT?: NO

## 2023-11-17 SDOH — SOCIAL STABILITY: SOCIAL INSECURITY: ABUSE: ADULT

## 2023-11-17 SDOH — SOCIAL STABILITY: SOCIAL INSECURITY: HAS ANYONE EVER THREATENED TO HURT YOUR FAMILY OR YOUR PETS?: NO

## 2023-11-17 SDOH — SOCIAL STABILITY: SOCIAL INSECURITY: DO YOU FEEL UNSAFE GOING BACK TO THE PLACE WHERE YOU ARE LIVING?: NO

## 2023-11-17 SDOH — SOCIAL STABILITY: SOCIAL INSECURITY: ARE YOU OR HAVE YOU BEEN THREATENED OR ABUSED PHYSICALLY, EMOTIONALLY, OR SEXUALLY BY ANYONE?: NO

## 2023-11-17 SDOH — SOCIAL STABILITY: SOCIAL INSECURITY: DO YOU FEEL ANYONE HAS EXPLOITED OR TAKEN ADVANTAGE OF YOU FINANCIALLY OR OF YOUR PERSONAL PROPERTY?: NO

## 2023-11-17 SDOH — SOCIAL STABILITY: SOCIAL INSECURITY: WERE YOU ABLE TO COMPLETE ALL THE BEHAVIORAL HEALTH SCREENINGS?: YES

## 2023-11-17 SDOH — SOCIAL STABILITY: SOCIAL INSECURITY: HAVE YOU HAD THOUGHTS OF HARMING ANYONE ELSE?: NO

## 2023-11-17 SDOH — SOCIAL STABILITY: SOCIAL INSECURITY: DOES ANYONE TRY TO KEEP YOU FROM HAVING/CONTACTING OTHER FRIENDS OR DOING THINGS OUTSIDE YOUR HOME?: NO

## 2023-11-17 ASSESSMENT — ACTIVITIES OF DAILY LIVING (ADL)
JUDGMENT_ADEQUATE_SAFELY_COMPLETE_DAILY_ACTIVITIES: YES
WALKS IN HOME: INDEPENDENT
LACK_OF_TRANSPORTATION: NO
BATHING: NEEDS ASSISTANCE
HEARING - LEFT EAR: FUNCTIONAL
HEARING - RIGHT EAR: FUNCTIONAL
LACK_OF_TRANSPORTATION: NO
ADEQUATE_TO_COMPLETE_ADL: YES
GROOMING: INDEPENDENT
TOILETING: INDEPENDENT
FEEDING YOURSELF: INDEPENDENT
DRESSING YOURSELF: INDEPENDENT
ASSISTIVE_DEVICE: DENTURES PARTIAL
PATIENT'S MEMORY ADEQUATE TO SAFELY COMPLETE DAILY ACTIVITIES?: YES

## 2023-11-17 ASSESSMENT — COGNITIVE AND FUNCTIONAL STATUS - GENERAL
MOBILITY SCORE: 24
DAILY ACTIVITIY SCORE: 24
PATIENT BASELINE BEDBOUND: NO

## 2023-11-17 ASSESSMENT — PATIENT HEALTH QUESTIONNAIRE - PHQ9
SUM OF ALL RESPONSES TO PHQ9 QUESTIONS 1 & 2: 0
2. FEELING DOWN, DEPRESSED OR HOPELESS: NOT AT ALL
SUM OF ALL RESPONSES TO PHQ9 QUESTIONS 1 & 2: 0
2. FEELING DOWN, DEPRESSED OR HOPELESS: NOT AT ALL
1. LITTLE INTEREST OR PLEASURE IN DOING THINGS: NOT AT ALL
1. LITTLE INTEREST OR PLEASURE IN DOING THINGS: NOT AT ALL

## 2023-11-17 ASSESSMENT — LIFESTYLE VARIABLES
HOW MANY STANDARD DRINKS CONTAINING ALCOHOL DO YOU HAVE ON A TYPICAL DAY: PATIENT DOES NOT DRINK
PRESCIPTION_ABUSE_PAST_12_MONTHS: NO
AUDIT-C TOTAL SCORE: 0
SUBSTANCE_ABUSE_PAST_12_MONTHS: NO
HOW OFTEN DO YOU HAVE 6 OR MORE DRINKS ON ONE OCCASION: NEVER
AUDIT-C TOTAL SCORE: 0
SKIP TO QUESTIONS 9-10: 1
SUBSTANCE_ABUSE_PAST_12_MONTHS: NO
HOW MANY STANDARD DRINKS CONTAINING ALCOHOL DO YOU HAVE ON A TYPICAL DAY: PATIENT DOES NOT DRINK
AUDIT-C TOTAL SCORE: 0
SKIP TO QUESTIONS 9-10: 1
HOW OFTEN DO YOU HAVE A DRINK CONTAINING ALCOHOL: NEVER
AUDIT-C TOTAL SCORE: 0
HOW OFTEN DO YOU HAVE 6 OR MORE DRINKS ON ONE OCCASION: NEVER
HOW OFTEN DO YOU HAVE A DRINK CONTAINING ALCOHOL: NEVER
PRESCIPTION_ABUSE_PAST_12_MONTHS: NO

## 2023-11-17 ASSESSMENT — PAIN SCALES - PAIN ASSESSMENT IN ADVANCED DEMENTIA (PAINAD)
CONSOLABILITY: NO NEED TO CONSOLE
TOTALSCORE: 0
BODYLANGUAGE: RELAXED
FACIALEXPRESSION: SMILING OR INEXPRESSIVE
BREATHING: NORMAL

## 2023-11-17 ASSESSMENT — PAIN SCALES - GENERAL: PAINLEVEL_OUTOF10: 0 - NO PAIN

## 2023-11-17 ASSESSMENT — PAIN SCALES - WONG BAKER: WONGBAKER_NUMERICALRESPONSE: NO HURT

## 2023-11-17 NOTE — DISCHARGE SUMMARY
"Discharge Diagnosis  Chest pain    Issues Requiring Follow-Up  CAD s/p CABG  Paroxysmal A.fib       Discharge Meds     Your medication list        CONTINUE taking these medications        Instructions Last Dose Given Next Dose Due   Accu-Chek Fastclix Lancet Drum misc  Generic drug: lancets           BD Skye 2nd Gen Pen Needle 32 gauge x 5/32\" needle  Generic drug: pen needle, diabetic                  ASK your doctor about these medications        Instructions Last Dose Given Next Dose Due   acetaminophen 325 mg tablet  Commonly known as: Tylenol           amiodarone 100 mg tablet  Commonly known as: Pacerone           amLODIPine 2.5 mg tablet  Commonly known as: Norvasc           atorvastatin 80 mg tablet  Commonly known as: Lipitor           clopidogrel 75 mg tablet  Commonly known as: Plavix           Colace 100 mg capsule  Generic drug: docusate sodium           cyanocobalamin 500 mcg tablet  Commonly known as: Vitamin B-12           Eliquis 2.5 mg tablet  Generic drug: apixaban           furosemide 20 mg tablet  Commonly known as: Lasix           isosorbide mononitrate ER 60 mg 24 hr tablet  Commonly known as: Imdur           losartan 100 mg tablet  Commonly known as: Cozaar           metoprolol succinate XL 25 mg 24 hr tablet  Commonly known as: Toprol-XL      1/2 tablet by mouth daily       nitroglycerin 0.4 mg SL tablet  Commonly known as: Nitrostat           OneTouch Ultra Test strip  Generic drug: blood sugar diagnostic           Tresiba FlexTouch U-100 100 unit/mL (3 mL) injection  Generic drug: insulin degludec                    Test Results Pending At Discharge  Pending Labs       No current pending labs.            Hospital Course   Patient remained HDS throughout hospital course. Chest pain resolved prior to arrival at hospital. EKG was unchanged from previous. Troponin 29/32. Cr improved from 1.47 to 1.29. Telemetry reviewed. Patient was evaluated by cardiology and after discussion, determined " stable and appropriate for discharge without further cardiac testing at this time. Patient to resume home medications as prescribed. Follow up with PCP 5-7 days. Follow up with cardiology Dr. Mendiola as scheduled. All questions answered. Patient and family at bedside in agreement with plan of care.     Chest pain   CAD s/p CABG  CHF  HTN  Paroxysmal/persistent afib  - EKG NSR with PACs, nonspecific T wave abnormalities lateral leads  - CXR left basilar atelectasis or fibrosis, possibly with a small effusion.  - trop 29, 32  - BNP pending   - mg 2.0  - K 4.1   - maintain mg >2 and K>4, replace as needed  - cbc - grossly wnl   - prn nitroglycerin (didn't have at home, needs new script)   - telemetry   - continue home meds    - lasix 20 mg Monday and Friday  - considering elevated trop, HEART score 6 will consult cardiology    - follows with Dr. Mendiola     CKD III  - Cr 1.47, at baseline  - monitor BMP     Dvt prophylaxis   - lovenox deferred, continue home eliquis and plavix   - scd/ambulate      Gi prophylaxis   - pantopraxole   - miralax      DC plan  - DC home when medically stable     Labs/Testing imaging results reviewed with Dr. Ybarra      Pertinent Physical Exam At Time of Discharge  Physical Exam  Constitutional:       General: She is not in acute distress.     Appearance: She is not toxic-appearing.   HENT:      Head: Normocephalic and atraumatic.      Right Ear: External ear normal.      Left Ear: External ear normal.      Nose: Nose normal. No congestion.      Mouth/Throat:      Mouth: Mucous membranes are moist.      Pharynx: Oropharynx is clear.   Eyes:      General:         Right eye: No discharge.         Left eye: No discharge.      Conjunctiva/sclera: Conjunctivae normal.      Pupils: Pupils are equal, round, and reactive to light.   Cardiovascular:      Rate and Rhythm: Normal rate and regular rhythm.      Heart sounds: No murmur heard.     No gallop.   Pulmonary:      Effort: Pulmonary  effort is normal.      Breath sounds: No wheezing or rales.   Abdominal:      General: Abdomen is flat. Bowel sounds are normal.      Palpations: Abdomen is soft.      Tenderness: There is no abdominal tenderness. There is no guarding or rebound.   Musculoskeletal:         General: No swelling or tenderness. Normal range of motion.      Right lower leg: No edema.      Left lower leg: No edema.   Skin:     General: Skin is warm and dry.      Findings: No erythema or rash.   Neurological:      General: No focal deficit present.      Mental Status: She is alert.      Cranial Nerves: No cranial nerve deficit.      Motor: No weakness.   Psychiatric:         Mood and Affect: Mood normal.         Thought Content: Thought content normal.         Outpatient Follow-Up  Future Appointments   Date Time Provider Department Center   2/15/2024  4:00 PM Martha Domingo MD [id number] [facility]   4/30/2024  3:00 PM Mohsen Mendiola MD [id number] [facility]   6/5/2024  2:00 PM Lucas Camacho MD [id number]         Ramone Car PA-C

## 2023-11-17 NOTE — CARE PLAN
The patient's goals for the shift include no chest pain    The clinical goals for the shift include hemodynamically stable, no chest pain    Over the shift, the patient did  make progress toward the following goals.

## 2023-11-17 NOTE — PROGRESS NOTES
11/17/23 0924   Discharge Planning   Living Arrangements Alone   Support Systems Children   Assistance Needed None   Type of Residence Private residence   Number of Stairs to Enter Residence 3   Do you have animals or pets at home? No   Home or Post Acute Services None   Patient expects to be discharged to: Home   Does the patient need discharge transport arranged? No   Financial Resource Strain   How hard is it for you to pay for the very basics like food, housing, medical care, and heating? Not hard   Housing Stability   In the last 12 months, was there a time when you were not able to pay the mortgage or rent on time? N   In the last 12 months, how many places have you lived? 1   In the last 12 months, was there a time when you did not have a steady place to sleep or slept in a shelter (including now)? N   Transportation Needs   In the past 12 months, has lack of transportation kept you from medical appointments or from getting medications? no   In the past 12 months, has lack of transportation kept you from meetings, work, or from getting things needed for daily living? No

## 2023-11-17 NOTE — PROGRESS NOTES
Transitional Care Coordinator Note:  Met with patient and her son-in-law at the bedside to discuss discharge plans.  Patient lives alone except family stays with her at night.  Family transports to appointments.  Cardiology consulted for HEART score of 6.  Family will transport home when medically cleared.  No home care needs.  Shonda Joiner RN

## 2023-11-17 NOTE — CONSULTS
Wound Care Consult     Visit Date: 11/17/2023      Patient Name: Shona Montgomery         MRN: [id number]           YOB: 1929     Reason for Consult: Assessment complete. Biopsy site. Patient has follow up appointment scheduled with dermatology.            Pertinent Labs:   Albumin   Date Value Ref Range Status   11/16/2023 4.2 3.4 - 5.0 g/dL Final       Wound Assessment:  Wound 11/17/23 Other (comment) Arm Dorsal;Left;Lower (Active)   Wound Image   11/17/23 1037   Site Assessment Pink 11/17/23 1037   Pina-Wound Assessment Intact 11/17/23 1037   Non-staged Wound Description Full thickness 11/17/23 1037   Shape Round 11/17/23 1037   Wound Length (cm) 0.6 cm 11/17/23 1037   Wound Width (cm) 0.8 cm 11/17/23 1037   Wound Surface Area (cm^2) 0.48 cm^2 11/17/23 1037       Wound Team Summary Assessment: Notified Ramone SORTO of wound care recommendations     Left UE  Irrigate with normal saline or wound cleanser, Pat dry.  Apply no sting skin barrier (cavilon) to pina wound   Apply single layer of adaptic/vaseline gauze dressing  Cover with Mepilex border dressing  Change every day and as needed.          Wound Team Plan: Please re-consult wound/ostomy care for any changes or concerns     While in bed patient should only be on one fitted sheet, and one chux. Please, do not use brief while patient is resting in bed. Elevate heels off the bed surface at all times. Turn and reposition at least every 2 hours.     Thank you for this consultations, while inpatient please contact with any questions or changes in condition.       Joan Carbajal RN BSN,[facility],CWOCN  [phone]   11/17/2023  3:56 PM

## 2023-11-17 NOTE — CONSULTS
"Inpatient consult to Cardiology  Consult performed by: Torin CHIANG MD  Consult ordered by: Ramone Car PA-C  Reason for consult: chest pain        History Of Present Illness:    Shona Montgomery is a 89 y.o. female with past medical history of remote CABG, repeat cath 7 years ago showed a patent sequential LIMA with an occluded SVG-RCA, parosysmal atrial fibrillation maintaining NSR on amiodarone and on Eliquis for anticoagulation.  Cardiology consulted for chest pain.      States she was at the ophthalmology clinic for cataract surgery.  She did not take her medications in the morning as she was instructed by surgery team. States her surgery was delayed until finally at 2pm, she noticed some chest pressure,  and surgery was cancelled and she was sent to ED for evaluation of chest pain. Pain was substernal, 4/10 and pressure like in nature, resolved on its own.  States she did used to have frequent angina, but none since ~ 2020 when Dr. Mendiola found her \"perfect cocktail\" and now only very rarely has angina pain.  She feels completely back to baseline, hoping to go home.  Denies any shortness of breath, or orthopnea.  No lower extremity swelling.  No palpitations or syncope.  No headaches or falls.    Home cardiac regimen: amiodarone 100mg daily, amlodipine 2.5mg daily, Eliquis 2.5mg BID, atorvastatin, plavix, furosemide 20mg M W F, Imdur 90mg daily, losartan 50mg daily, and Toprol 12.5mg daily.    Her son in law is here today at bedside.     Follows with Dr. Mendiola for cardiology, last visit 10/2023    Past Cardiology Tests (Last 3 Years):  EKG:  Results for orders placed in visit on 10/24/23    ECG 12 lead (Clinic Performed)    Narrative  Sinus bradycardia  Incomplete left bundle branch block  Nonspecific T wave abnormality  Abnormal ECG  Confirmed by Mohsen Mendiola ([id number]) on 10/24/2023 7:17:54 PM    Echo:  CONCLUSIONS:   1. The left ventricular systolic function is moderately decreased with " a 35-40% estimated ejection fraction.   2. Spectral Doppler shows an impaired relaxation pattern of left ventricular diastolic filling.   3. The left atrium is severely dilated.   4. Moderate mitral valve regurgitation.   5. Mildly elevated RVSP.    Cath:  See above    Stress Test:  No results found for this or any previous visit.    Cardiac Imaging:  No results found for this or any previous visit.      Past Medical History:  She has a past medical history of Atherosclerotic heart disease of native coronary artery without angina pectoris (2022) and Personal history of other diseases of the circulatory system (2013).    Past Surgical History:  She has a past surgical history that includes Coronary artery bypass graft (2016) and Total hip arthroplasty (2014).      Social History:  She reports that she has never smoked. She has never used smokeless tobacco. No history on file for alcohol use and drug use.    Family History:  Family History   Problem Relation Name Age of Onset    No Known Problems Mother      No Known Problems Father          Allergies:  Amoxicillin and Tramadol    ROS:  10 point review of systems including (Constitutional, Eyes, ENMT, Respiratory, Cardiac, Gastrointestinal, Neurological, Psychiatric, and Hematologic) was performed and is otherwise negative.    Objective Data:  Last Recorded Vitals:  Vitals:    23 2122 23 2334 23 0549 23 0736   BP: 147/64 137/78 155/81 (!) 184/74   BP Location:   Right arm Left arm   Patient Position:   Lying Lying   Pulse: 77 89 53 54   Resp: 18 18 18 18   Temp:   36.7 °C (98.1 °F) 36.3 °C (97.4 °F)   TempSrc:    Oral   SpO2: 98% 97% 96% 97%   Weight:       Height:         Medical Gas Therapy: None (Room air)  Weight  Av.3 kg (113 lb)  Min: 51.3 kg (113 lb)  Max: 51.3 kg (113 lb)      LABS:  CMP:  Results from last 7 days   Lab Units 23  0625 23  1549   SODIUM mmol/L 138 139   POTASSIUM mmol/L 3.9 4.1  "  CHLORIDE mmol/L 105 103   CO2 mmol/L 25 27   ANION GAP mmol/L 12 13   BUN mg/dL 32* 33*   CREATININE mg/dL 1.29* 1.47*   EGFR mL/min/1.73m*2 39* 33*   MAGNESIUM mg/dL  --  2.00   ALBUMIN g/dL  --  4.2   ALT U/L  --  21   AST U/L  --  22   BILIRUBIN TOTAL mg/dL  --  0.7     CBC:  Results from last 7 days   Lab Units 11/17/23  0624 11/16/23  1549   WBC AUTO x10*3/uL 4.8 5.9   HEMOGLOBIN g/dL 10.9* 12.1   HEMATOCRIT % 33.7* 37.6   PLATELETS AUTO x10*3/uL 204 226   MCV fL 92 94     COAG:     ABO: No results found for: \"ABO\"  HEME/ENDO:     CARDIAC:   Results from last 7 days   Lab Units 11/17/23  0625 11/16/23  1815 11/16/23  1549   TROPHS ng/L 32* 29* 29*   BNP pg/mL  --   --  378*             Last I/O:  No intake or output data in the 24 hours ending 11/17/23 0918  Net IO Since Admission: No IO data has been entered for this period [11/17/23 0918]      Imaging Results:  XR chest 2 views    Result Date: 11/16/2023  Interpreted By:  Tima Ramos, STUDY: XR CHEST 2 VIEWS;  11/16/2023 4:48 pm   INDICATION: Signs/Symptoms:chest pain.   COMPARISON: 04/05/2022   ACCESSION NUMBER(S): [id number]   ORDERING CLINICIAN: CARRIE MORELAND   FINDINGS: CARDIOMEDIASTINAL SILHOUETTE AND VASCULATURE:   Cardiac size:  Within normal limits. Status post median sternotomy.   Aortic shadow:  Within normal limits.   Mediastinal contours: Within normal limits.   Pulmonary vasculature:  The central vasculature is unremarkable   LUNGS: Left basilar discoid atelectasis and/or fibrosis. There is slight blunting of the costophrenic angles posteriorly on the lateral projection also probably due to atelectasis or fibrosis although a small effusion is possible.   ABDOMEN AND OTHER FINDINGS: No remarkable upper abdominal findings.   BONES: No acute osseous changes.       1.  Left basilar atelectasis or fibrosis, possibly with a small effusion.   Signed by: Tima Ramos 11/16/2023 5:01 PM Dictation workstation:   " [id number]      Inpatient Medications:  Scheduled medications   Medication Dose Route Frequency    amiodarone  100 mg oral Daily    amLODIPine  2.5 mg oral Daily    apixaban  2.5 mg oral BID    atorvastatin  80 mg oral Nightly    clopidogrel  75 mg oral Daily    furosemide  20 mg oral Daily    insulin glargine  6 Units subcutaneous Nightly    insulin lispro  0-5 Units subcutaneous TID with meals    [Held by provider] isosorbide mononitrate ER  90 mg oral Daily    [Held by provider] losartan  50 mg oral BID    metoprolol succinate XL  12.5 mg oral BID    pantoprazole  40 mg oral Daily before breakfast    Or    pantoprazole  40 mg intravenous Daily before breakfast    polyethylene glycol  17 g oral Daily    prednisoLONE acetate  1 drop Left Eye Nightly     PRN medications   Medication    acetaminophen    Or    acetaminophen    Or    acetaminophen    dextrose 10 % in water (D10W)    dextrose 10 % in water (D10W)    dextrose    dextrose    glucagon    glucagon    nitroglycerin    ondansetron ODT    Or    ondansetron     Continuous Medications   Medication Dose Last Rate       Outpatient Medications:  Current Outpatient Medications   Medication Instructions    acetaminophen (TYLENOL) 650 mg, oral, Every 4 hours PRN    amiodarone (PACERONE) 100 mg, oral, Daily    amLODIPine (Norvasc) 2.5 mg tablet 1 tablet, oral, Daily    apixaban (Eliquis) 2.5 mg tablet 1 tablet, oral, 2 times daily    atorvastatin (Lipitor) 80 mg tablet 1 tablet, oral, Nightly    clopidogrel (Plavix) 75 mg tablet 1 tablet, oral, Daily    cyanocobalamin (VITAMIN B-12) 500 mcg, oral, Daily    docusate sodium (COLACE) 100 mg, oral, Daily    furosemide (LASIX) 20 mg, oral, Daily, On Mondays Wednesday and Friday    insulin degludec (TRESIBA FLEXTOUCH U-100) 5 Units, subcutaneous, Nightly    isosorbide mononitrate ER (Imdur) 60 mg 24 hr tablet 1.5 tablets, oral, Daily    lancets (Accu-Chek Fastclix Lancet Drum) misc miscellaneous    losartan (Cozaar) 100 mg  "tablet 0.5 tablets, oral, 2 times daily    metoprolol succinate XL (Toprol-XL) 25 mg 24 hr tablet 1/2 tablet by mouth daily    nitroglycerin (NITROSTAT) 0.4 mg, sublingual, Every 5 min PRN    OneTouch Ultra Test strip 3 times daily    pen needle, diabetic (BD Skye 2nd Gen Pen Needle) 32 gauge x 5/32\" needle miscellaneous       Physical Exam:  General:  Patient is awake, alert, and oriented.  Patient is in no acute distress.  HEENT:  Pupils equal and reactive.  Normocephalic.  Moist mucosa.    Neck:  No thyromegaly.  Normal Jugular Venous Pressure.  Cardiovascular:  Regular rate and rhythm.  Normal S1 and S2.  Pulmonary:  Clear to auscultation bilaterally.  Abdomen:  Soft. Non-tender.   Non-distended.  Positive bowel sounds.  Lower Extremities:  2+ pedal pulses. No LE edema.  Neurologic:  Cranial nerves intact.  No focal deficit.   Skin: Skin warm and dry, normal skin turgor.   Psychiatric: Normal affect.     Assessment/Plan   Shona Montgomery is a 89 y.o. female with past medical history of remote CABG, repeat cath 7 years ago showed a patent sequential LIMA with an occluded SVG-RCA, parosysmal atrial fibrillation maintaining NSR on amiodarone and on Eliquis for anticoagulation.  Cardiology consulted for chest pain.      EKG showing NSR with incomplete LBBB similar to previous without any acute changes.  HS troponin 29.  Creatinine stable 1.4.  H/h stable, no leukocytosis.  Normotensive, hemodynamically stable, chest pain free, appears euvolemic.  There are no significant clinical signs / symptoms or ischemic changes consistent with ACS.     Assessment:  CAD, s/p remote CABG with episode of stable angina  Hypertension    Plan:  Long discussion with patient and her son in law at bedside.  Patient is back to baseline and very happy with her current home regimen including Imdur.  They both prefer to forgo any further testing at this time and hoping to continue current medication regimen without changes, as this has worked " well for years.  Patient states she had a very stressful week with Dr. Kemp, and helping a neighbor, coupled with holding morning meds with planned cataract surgery.     Continue home cardiac regimen: amiodarone 100mg daily, amlodipine 2.5mg daily, Eliquis 2.5mg BID, atorvastatin, plavix, furosemide 20mg M W F, Imdur 90mg daily, losartan 50mg daily, and Toprol 12.5mg daily.    For further surgeries and procedures, we would recommend not holding amlodipine or Imdur.     Follow up with Dr. Mendiola within 1 month of hospital discharge.  Ok to discharge home, at the discretion of the primary team, once seen by Dr. Sanchez from Cardiology.     Code Status:  Full Code   Thank you for allowing me to participate in their care.  Please feel free to call me with any further questions or concerns.    Torin Sanchez MD, [facility], [facility]  Division of Cardiovascular Medicine  Medical Director, [facility] [place]  [facility]  Assistant Clinical Professor, Medicine  [employer] [facility] [employer] [facility] [employer]  [email or web address]  Office:  [phone]     Both the AYALA and I have had a face to face encounter with the patient today. I have examined the patient and edited the documented physical examination as necessary.  I personally reviewed the patient's vital signs, telemetry, recent labs, medications, orders, EKGs, and pertinent cardiac imaging/ echocardiography.  I have reviewed the AYALA's encounter note, approve the AYALA's documentation and have edited the note to reflect my diagnostic and therapeutic plan.

## 2023-11-17 NOTE — NURSING NOTE
1745-Discharge instructions and follow up appointments reviewed with patient and family both patient and family verbalize understanding of all discharge instructions and follow up appointments.

## 2023-11-17 NOTE — PROGRESS NOTES
Pharmacy Medication History Review    Shona Montgomery is a 89 y.o. female admitted for Chest pain. Pharmacy reviewed the patient's silyk-iy-wqghdmkty medications and allergies for accuracy.    The list below reflectives the updated PTA list. Please review each medication in order reconciliation for additional clarification and justification.  Prior to Admission Medications   Prescriptions Last Dose Informant Patient Reported? Taking?      Yes No         Yes No   .   amLODIPine (Norvasc) 2.5 mg tablet 2023 at 33541  Yes No   Sig: Take 1 tablet (2.5 mg) by mouth once daily.   amiodarone (Pacerone) 100 mg tablet 2023 at 0900  Yes No   Sig: Take 1 tablet (100 mg) by mouth once daily.   apixaban (Eliquis) 2.5 mg tablet 2023 at 2100  Yes No   Sig: Take 1 tablet (2.5 mg) by mouth 2 times a day.   atorvastatin (Lipitor) 80 mg tablet 2023 at 2100  Yes No   Sig: Take 1 tablet (80 mg) by mouth once daily at bedtime.   clopidogrel (Plavix) 75 mg tablet 2023 at 0900  Yes No   Sig: Take 1 tablet (75 mg) by mouth once daily.   cyanocobalamin (Vitamin B-12) 500 mcg tablet   Yes No   Sig: Take 1 tablet (500 mcg) by mouth once daily.   docusate sodium (Colace) 100 mg capsule 2023 at 0900  Yes No   Sig: Take 1 capsule (100 mg) by mouth once daily.   furosemide (Lasix) 20 mg tablet 2023 at 0900  Yes No   Sig: Take 1 tablet (20 mg) by mouth once daily. On  and Friday   insulin degludec (Tresiba FlexTouch U-100) 100 unit/mL (3 mL) injection 2023 at 2000  Yes No   Sig: Inject 5 Units under the skin once daily at bedtime.   isosorbide mononitrate ER (Imdur) 60 mg 24 hr tablet 2023 at 0900  Yes No   Sig: Take 1.5 tablets by mouth once daily.   losartan (Cozaar) 100 mg tablet 2023  Yes No   Sig: Take 0.5 tablets (50 mg) by mouth 2 times a day.   metoprolol succinate XL (Toprol-XL) 25 mg 24 hr tablet 2023  No No   Si/2 tablet by mouth daily   moxifloxacin  "(Vigamox) 0.5 % ophthalmic solution   Yes Yes   Sig: Administer 1 drop into the left eye 3 times a day.   nitroglycerin (Nitrostat) 0.4 mg SL tablet More than a month  Yes No   Sig: Place 1 tablet (0.4 mg) under the tongue every 5 minutes if needed for chest pain.   timolol (Timoptic) 0.5 % ophthalmic solution   Yes Yes   Sig: Administer 1 drop into the left eye 2 times a day.      Facility-Administered Medications: None         The list below reflectives the updated allergy list. Please review each documented allergy for additional clarification and justification.  Allergies  Reviewed by Azucena Valentino RN on 11/17/2023        Severity Reactions Comments    Amoxicillin Not Specified Unknown     Tramadol Not Specified Other \"psychadelic Lights\"            Below are additional concerns with the patient's PTA list.  Patient's daughter verified all medications and doses.    Gavi Mariano CPhT    "

## 2023-11-17 NOTE — TELEPHONE ENCOUNTER
Pt went to er/hosptial yesterday for cardiac issues and per daughter the plan is to release her home today. Family is concerned about pts insulin regime as the hospital gave pt 5U fast acting insulin to cover her BS of 430 today. Patient  daughter is asking if pt is discharged home today should they go back to her original regime of tresiba or will they need to keep pt on meal time insulin also?     **pt has not been discharged yet

## 2023-12-07 DIAGNOSIS — E11.9 TYPE 2 DIABETES MELLITUS WITHOUT COMPLICATION, WITH LONG-TERM CURRENT USE OF INSULIN (MULTI): Primary | ICD-10-CM

## 2023-12-07 DIAGNOSIS — Z79.4 TYPE 2 DIABETES MELLITUS WITHOUT COMPLICATION, WITH LONG-TERM CURRENT USE OF INSULIN (MULTI): Primary | ICD-10-CM

## 2023-12-07 RX ORDER — LANCETS
1 EACH MISCELLANEOUS 3 TIMES DAILY
Qty: 300 EACH | Refills: 3 | Status: SHIPPED | OUTPATIENT
Start: 2023-12-07 | End: 2024-01-04 | Stop reason: SDUPTHER

## 2023-12-18 ENCOUNTER — OFFICE VISIT (OUTPATIENT)
Dept: CARDIOLOGY | Facility: CLINIC | Age: 88
End: 2023-12-18
Payer: MEDICARE

## 2023-12-18 VITALS
HEIGHT: 63 IN | WEIGHT: 114.7 LBS | HEART RATE: 54 BPM | DIASTOLIC BLOOD PRESSURE: 70 MMHG | SYSTOLIC BLOOD PRESSURE: 145 MMHG | BODY MASS INDEX: 20.32 KG/M2

## 2023-12-18 DIAGNOSIS — I21.4 NSTEMI (NON-ST ELEVATED MYOCARDIAL INFARCTION) (MULTI): ICD-10-CM

## 2023-12-18 DIAGNOSIS — I48.20 CHRONIC ATRIAL FIBRILLATION (MULTI): Primary | ICD-10-CM

## 2023-12-18 LAB
ATRIAL RATE: 54 BPM
P AXIS: 68 DEGREES
P OFFSET: 200 MS
P ONSET: 137 MS
PR INTERVAL: 164 MS
Q ONSET: 219 MS
QRS COUNT: 9 BEATS
QRS DURATION: 114 MS
QT INTERVAL: 454 MS
QTC CALCULATION(BAZETT): 430 MS
QTC FREDERICIA: 438 MS
R AXIS: -4 DEGREES
T AXIS: 111 DEGREES
T OFFSET: 446 MS
VENTRICULAR RATE: 54 BPM

## 2023-12-18 PROCEDURE — 1160F RVW MEDS BY RX/DR IN RCRD: CPT | Performed by: INTERNAL MEDICINE

## 2023-12-18 PROCEDURE — 1126F AMNT PAIN NOTED NONE PRSNT: CPT | Performed by: INTERNAL MEDICINE

## 2023-12-18 PROCEDURE — 93010 ELECTROCARDIOGRAM REPORT: CPT | Performed by: INTERNAL MEDICINE

## 2023-12-18 PROCEDURE — 99214 OFFICE O/P EST MOD 30 MIN: CPT | Performed by: INTERNAL MEDICINE

## 2023-12-18 PROCEDURE — 3077F SYST BP >= 140 MM HG: CPT | Performed by: INTERNAL MEDICINE

## 2023-12-18 PROCEDURE — 1036F TOBACCO NON-USER: CPT | Performed by: INTERNAL MEDICINE

## 2023-12-18 PROCEDURE — 93005 ELECTROCARDIOGRAM TRACING: CPT | Mod: PO | Performed by: INTERNAL MEDICINE

## 2023-12-18 PROCEDURE — 99214 OFFICE O/P EST MOD 30 MIN: CPT | Mod: PO | Performed by: INTERNAL MEDICINE

## 2023-12-18 PROCEDURE — 1159F MED LIST DOCD IN RCRD: CPT | Performed by: INTERNAL MEDICINE

## 2023-12-18 PROCEDURE — 3078F DIAST BP <80 MM HG: CPT | Performed by: INTERNAL MEDICINE

## 2023-12-18 RX ORDER — FERROUS SULFATE 325(65) MG
65 TABLET, DELAYED RELEASE (ENTERIC COATED) ORAL EVERY OTHER DAY
COMMUNITY

## 2023-12-18 RX ORDER — NITROGLYCERIN 0.4 MG/1
0.4 TABLET SUBLINGUAL EVERY 5 MIN PRN
Qty: 25 TABLET | Refills: 3 | Status: SHIPPED | OUTPATIENT
Start: 2023-12-18

## 2023-12-18 ASSESSMENT — PATIENT HEALTH QUESTIONNAIRE - PHQ9
2. FEELING DOWN, DEPRESSED OR HOPELESS: NOT AT ALL
SUM OF ALL RESPONSES TO PHQ9 QUESTIONS 1 AND 2: 0
1. LITTLE INTEREST OR PLEASURE IN DOING THINGS: NOT AT ALL

## 2023-12-18 ASSESSMENT — PAIN SCALES - GENERAL: PAINLEVEL: 0-NO PAIN

## 2023-12-18 ASSESSMENT — ENCOUNTER SYMPTOMS
DEPRESSION: 0
OCCASIONAL FEELINGS OF UNSTEADINESS: 0
LOSS OF SENSATION IN FEET: 0

## 2023-12-18 NOTE — PROGRESS NOTES
Subjective:  Patient returns for a hospital follow-up.  She required a brief overnight stay recently for some chest and arm discomfort.  Her troponins were marginally elevated and flat arguing against an ACS.  She fortunately has not had any recurrent chest or arm pain.  She generally has been stable clinically without any palpitations.  She denies any bleeding problems despite good compliance with her apixaban and clopidogrel.  She is accompanied as usual by a supportive daughter who also feels she continues to do quite well despite her very advanced age.  She is anticipating hopefully getting her other cataract done.  She did not need any prescriptions renewed.    Objective:  General: Alert pleasant very elderly female.  HEENT: Unchanged.  Lungs: Clear without crackles.  Cardiac: Normal S1 and S2 with 1-2/6 systolic murmur.  Abdomen: Nontender with normal bowel sounds.  Extremities: No edema.  Skin: No rash.  Neuro: Grossly intact and unchanged.    EKG: Sinus bradycardia.  Lateral T wave inversion.  No acute changes.    Lipid panel: Cholesterol-158, HDL-63, LDL-77, TG-91.    Impression/plan:  Shona continues to do well at this time.  I am not exactly sure what caused her recent episode of chest and arm pain but this fortunately has not recurred.  Her heart rate and blood pressure remain under good control.  She does appear to be maintaining normal sinus rhythm but remains appropriately anticoagulated.  She also is on appropriate antiplatelet therapy given her known coronary disease status post remote CABG.  Her lipid panel looks quite reasonably good.  I elected  to embark on no additional testing at this time and will not make any medication changes.  I will see her back as originally planned in April but she and her daughter know to call for any intercurrent problems.  I do think she is very acceptable risk to undergo cataract surgery.  She may hold her Eliquis for 2 to 3 days if needed.  She should remain on her  clopidogrel.        Impression/plan:    Continue current medications unchanged.    Return to clinic in 4 months.    Call for any intercurrent problems.    You are cleared for cataract surgery.  You may hold the Eliquis for 2 to 3 days before the procedure if needed.

## 2023-12-21 ENCOUNTER — LAB REQUISITION (OUTPATIENT)
Dept: LAB | Facility: HOSPITAL | Age: 88
End: 2023-12-21
Payer: MEDICARE

## 2023-12-21 DIAGNOSIS — J06.9 ACUTE UPPER RESPIRATORY INFECTION, UNSPECIFIED: ICD-10-CM

## 2023-12-21 PROCEDURE — 87637 SARSCOV2&INF A&B&RSV AMP PRB: CPT

## 2023-12-22 ENCOUNTER — HOSPITAL ENCOUNTER (INPATIENT)
Facility: HOSPITAL | Age: 88
LOS: 3 days | Discharge: HOME | DRG: 193 | End: 2023-12-26
Attending: EMERGENCY MEDICINE | Admitting: INTERNAL MEDICINE
Payer: MEDICARE

## 2023-12-22 ENCOUNTER — APPOINTMENT (OUTPATIENT)
Dept: RADIOLOGY | Facility: HOSPITAL | Age: 88
DRG: 193 | End: 2023-12-22
Payer: MEDICARE

## 2023-12-22 ENCOUNTER — APPOINTMENT (OUTPATIENT)
Dept: CARDIOLOGY | Facility: HOSPITAL | Age: 88
DRG: 193 | End: 2023-12-22
Payer: MEDICARE

## 2023-12-22 DIAGNOSIS — I20.89 STABLE ANGINA PECTORIS (CMS-HCC): ICD-10-CM

## 2023-12-22 DIAGNOSIS — J10.1 INFLUENZA A: Primary | ICD-10-CM

## 2023-12-22 DIAGNOSIS — R79.89 TROPONIN LEVEL ELEVATED: ICD-10-CM

## 2023-12-22 LAB
ALBUMIN SERPL BCP-MCNC: 3.7 G/DL (ref 3.4–5)
ALP SERPL-CCNC: 89 U/L (ref 33–136)
ALT SERPL W P-5'-P-CCNC: 17 U/L (ref 7–45)
ANION GAP SERPL CALC-SCNC: 11 MMOL/L (ref 10–20)
AST SERPL W P-5'-P-CCNC: 19 U/L (ref 9–39)
BASOPHILS # BLD AUTO: 0.02 X10*3/UL (ref 0–0.1)
BASOPHILS NFR BLD AUTO: 0.4 %
BILIRUB SERPL-MCNC: 0.7 MG/DL (ref 0–1.2)
BNP SERPL-MCNC: 649 PG/ML (ref 0–99)
BUN SERPL-MCNC: 38 MG/DL (ref 6–23)
CALCIUM SERPL-MCNC: 9.2 MG/DL (ref 8.6–10.3)
CARDIAC TROPONIN I PNL SERPL HS: 37 NG/L (ref 0–13)
CARDIAC TROPONIN I PNL SERPL HS: 39 NG/L (ref 0–13)
CARDIAC TROPONIN I PNL SERPL HS: 40 NG/L (ref 0–13)
CHLORIDE SERPL-SCNC: 103 MMOL/L (ref 98–107)
CO2 SERPL-SCNC: 26 MMOL/L (ref 21–32)
CREAT SERPL-MCNC: 1.56 MG/DL (ref 0.5–1.05)
EOSINOPHIL # BLD AUTO: 0.02 X10*3/UL (ref 0–0.4)
EOSINOPHIL NFR BLD AUTO: 0.4 %
ERYTHROCYTE [DISTWIDTH] IN BLOOD BY AUTOMATED COUNT: 13.7 % (ref 11.5–14.5)
FLUAV RNA RESP QL NAA+PROBE: DETECTED
FLUAV RNA RESP QL NAA+PROBE: NOT DETECTED
FLUBV RNA RESP QL NAA+PROBE: NOT DETECTED
FLUBV RNA RESP QL NAA+PROBE: NOT DETECTED
GFR SERPL CREATININE-BSD FRML MDRD: 31 ML/MIN/1.73M*2
GLUCOSE BLD MANUAL STRIP-MCNC: 132 MG/DL (ref 74–99)
GLUCOSE SERPL-MCNC: 154 MG/DL (ref 74–99)
HCT VFR BLD AUTO: 33.1 % (ref 36–46)
HGB BLD-MCNC: 10.6 G/DL (ref 12–16)
IMM GRANULOCYTES # BLD AUTO: 0.01 X10*3/UL (ref 0–0.5)
IMM GRANULOCYTES NFR BLD AUTO: 0.2 % (ref 0–0.9)
LYMPHOCYTES # BLD AUTO: 0.82 X10*3/UL (ref 0.8–3)
LYMPHOCYTES NFR BLD AUTO: 15.3 %
MAGNESIUM SERPL-MCNC: 2.1 MG/DL (ref 1.6–2.4)
MCH RBC QN AUTO: 30.5 PG (ref 26–34)
MCHC RBC AUTO-ENTMCNC: 32 G/DL (ref 32–36)
MCV RBC AUTO: 95 FL (ref 80–100)
MONOCYTES # BLD AUTO: 0.56 X10*3/UL (ref 0.05–0.8)
MONOCYTES NFR BLD AUTO: 10.4 %
NEUTROPHILS # BLD AUTO: 3.94 X10*3/UL (ref 1.6–5.5)
NEUTROPHILS NFR BLD AUTO: 73.3 %
NRBC BLD-RTO: 0 /100 WBCS (ref 0–0)
PLATELET # BLD AUTO: 197 X10*3/UL (ref 150–450)
POTASSIUM SERPL-SCNC: 4.2 MMOL/L (ref 3.5–5.3)
PROT SERPL-MCNC: 6.3 G/DL (ref 6.4–8.2)
RBC # BLD AUTO: 3.48 X10*6/UL (ref 4–5.2)
RSV RNA RESP QL NAA+PROBE: NOT DETECTED
RSV RNA RESP QL NAA+PROBE: NOT DETECTED
SARS-COV-2 RNA RESP QL NAA+PROBE: NOT DETECTED
SARS-COV-2 RNA RESP QL NAA+PROBE: NOT DETECTED
SODIUM SERPL-SCNC: 136 MMOL/L (ref 136–145)
WBC # BLD AUTO: 5.4 X10*3/UL (ref 4.4–11.3)

## 2023-12-22 PROCEDURE — 93005 ELECTROCARDIOGRAM TRACING: CPT

## 2023-12-22 PROCEDURE — 83880 ASSAY OF NATRIURETIC PEPTIDE: CPT | Performed by: EMERGENCY MEDICINE

## 2023-12-22 PROCEDURE — 2500000004 HC RX 250 GENERAL PHARMACY W/ HCPCS (ALT 636 FOR OP/ED): Performed by: PHYSICIAN ASSISTANT

## 2023-12-22 PROCEDURE — 2500000004 HC RX 250 GENERAL PHARMACY W/ HCPCS (ALT 636 FOR OP/ED): Performed by: STUDENT IN AN ORGANIZED HEALTH CARE EDUCATION/TRAINING PROGRAM

## 2023-12-22 PROCEDURE — 85025 COMPLETE CBC W/AUTO DIFF WBC: CPT | Performed by: STUDENT IN AN ORGANIZED HEALTH CARE EDUCATION/TRAINING PROGRAM

## 2023-12-22 PROCEDURE — 80053 COMPREHEN METABOLIC PANEL: CPT | Performed by: STUDENT IN AN ORGANIZED HEALTH CARE EDUCATION/TRAINING PROGRAM

## 2023-12-22 PROCEDURE — 84484 ASSAY OF TROPONIN QUANT: CPT | Performed by: STUDENT IN AN ORGANIZED HEALTH CARE EDUCATION/TRAINING PROGRAM

## 2023-12-22 PROCEDURE — 84484 ASSAY OF TROPONIN QUANT: CPT | Performed by: EMERGENCY MEDICINE

## 2023-12-22 PROCEDURE — 2500000001 HC RX 250 WO HCPCS SELF ADMINISTERED DRUGS (ALT 637 FOR MEDICARE OP): Performed by: PHYSICIAN ASSISTANT

## 2023-12-22 PROCEDURE — G0378 HOSPITAL OBSERVATION PER HR: HCPCS

## 2023-12-22 PROCEDURE — 71045 X-RAY EXAM CHEST 1 VIEW: CPT | Mod: FR

## 2023-12-22 PROCEDURE — 82947 ASSAY GLUCOSE BLOOD QUANT: CPT

## 2023-12-22 PROCEDURE — 87637 SARSCOV2&INF A&B&RSV AMP PRB: CPT | Performed by: STUDENT IN AN ORGANIZED HEALTH CARE EDUCATION/TRAINING PROGRAM

## 2023-12-22 PROCEDURE — 96361 HYDRATE IV INFUSION ADD-ON: CPT

## 2023-12-22 PROCEDURE — 96375 TX/PRO/DX INJ NEW DRUG ADDON: CPT

## 2023-12-22 PROCEDURE — 99223 1ST HOSP IP/OBS HIGH 75: CPT | Performed by: PHYSICIAN ASSISTANT

## 2023-12-22 PROCEDURE — 71045 X-RAY EXAM CHEST 1 VIEW: CPT | Mod: FOREIGN READ | Performed by: RADIOLOGY

## 2023-12-22 PROCEDURE — 36415 COLL VENOUS BLD VENIPUNCTURE: CPT | Performed by: STUDENT IN AN ORGANIZED HEALTH CARE EDUCATION/TRAINING PROGRAM

## 2023-12-22 PROCEDURE — 96372 THER/PROPH/DIAG INJ SC/IM: CPT

## 2023-12-22 PROCEDURE — 99285 EMERGENCY DEPT VISIT HI MDM: CPT | Performed by: EMERGENCY MEDICINE

## 2023-12-22 PROCEDURE — 83735 ASSAY OF MAGNESIUM: CPT | Performed by: STUDENT IN AN ORGANIZED HEALTH CARE EDUCATION/TRAINING PROGRAM

## 2023-12-22 PROCEDURE — 96374 THER/PROPH/DIAG INJ IV PUSH: CPT | Mod: 59

## 2023-12-22 RX ORDER — ACETAMINOPHEN 325 MG/1
650 TABLET ORAL EVERY 4 HOURS PRN
Status: DISCONTINUED | OUTPATIENT
Start: 2023-12-22 | End: 2023-12-26 | Stop reason: HOSPADM

## 2023-12-22 RX ORDER — ACETAMINOPHEN 650 MG/1
650 SUPPOSITORY RECTAL EVERY 4 HOURS PRN
Status: DISCONTINUED | OUTPATIENT
Start: 2023-12-22 | End: 2023-12-26 | Stop reason: HOSPADM

## 2023-12-22 RX ORDER — FUROSEMIDE 20 MG/1
20 TABLET ORAL DAILY
Status: DISCONTINUED | OUTPATIENT
Start: 2023-12-23 | End: 2023-12-26 | Stop reason: HOSPADM

## 2023-12-22 RX ORDER — ONDANSETRON 4 MG/1
4 TABLET, ORALLY DISINTEGRATING ORAL EVERY 8 HOURS PRN
Status: DISCONTINUED | OUTPATIENT
Start: 2023-12-22 | End: 2023-12-26 | Stop reason: HOSPADM

## 2023-12-22 RX ORDER — OSELTAMIVIR PHOSPHATE 30 MG/1
30 CAPSULE ORAL DAILY
Status: DISCONTINUED | OUTPATIENT
Start: 2023-12-23 | End: 2023-12-26 | Stop reason: HOSPADM

## 2023-12-22 RX ORDER — POLYETHYLENE GLYCOL 3350 17 G/17G
17 POWDER, FOR SOLUTION ORAL DAILY
Status: DISCONTINUED | OUTPATIENT
Start: 2023-12-22 | End: 2023-12-26 | Stop reason: HOSPADM

## 2023-12-22 RX ORDER — DOCUSATE SODIUM 100 MG/1
100 CAPSULE, LIQUID FILLED ORAL DAILY
Status: DISCONTINUED | OUTPATIENT
Start: 2023-12-22 | End: 2023-12-23

## 2023-12-22 RX ORDER — TIMOLOL MALEATE 5 MG/ML
1 SOLUTION/ DROPS OPHTHALMIC 2 TIMES DAILY
Status: DISCONTINUED | OUTPATIENT
Start: 2023-12-22 | End: 2023-12-26 | Stop reason: HOSPADM

## 2023-12-22 RX ORDER — PANTOPRAZOLE SODIUM 40 MG/10ML
40 INJECTION, POWDER, LYOPHILIZED, FOR SOLUTION INTRAVENOUS
Status: DISCONTINUED | OUTPATIENT
Start: 2023-12-23 | End: 2023-12-26 | Stop reason: HOSPADM

## 2023-12-22 RX ORDER — ISOSORBIDE MONONITRATE 30 MG/1
60 TABLET, EXTENDED RELEASE ORAL DAILY
Status: DISCONTINUED | OUTPATIENT
Start: 2023-12-23 | End: 2023-12-26 | Stop reason: HOSPADM

## 2023-12-22 RX ORDER — AMLODIPINE BESYLATE 2.5 MG/1
2.5 TABLET ORAL DAILY
Status: DISCONTINUED | OUTPATIENT
Start: 2023-12-23 | End: 2023-12-23

## 2023-12-22 RX ORDER — ALBUTEROL SULFATE 0.83 MG/ML
2.5 SOLUTION RESPIRATORY (INHALATION) EVERY 6 HOURS PRN
Status: DISCONTINUED | OUTPATIENT
Start: 2023-12-22 | End: 2023-12-23

## 2023-12-22 RX ORDER — DEXTROSE MONOHYDRATE 100 MG/ML
0.3 INJECTION, SOLUTION INTRAVENOUS ONCE AS NEEDED
Status: DISCONTINUED | OUTPATIENT
Start: 2023-12-22 | End: 2023-12-26 | Stop reason: HOSPADM

## 2023-12-22 RX ORDER — NITROGLYCERIN 0.4 MG/1
0.4 TABLET SUBLINGUAL EVERY 5 MIN PRN
Status: DISCONTINUED | OUTPATIENT
Start: 2023-12-22 | End: 2023-12-26 | Stop reason: HOSPADM

## 2023-12-22 RX ORDER — DEXTROSE 50 % IN WATER (D50W) INTRAVENOUS SYRINGE
25
Status: DISCONTINUED | OUTPATIENT
Start: 2023-12-22 | End: 2023-12-26 | Stop reason: HOSPADM

## 2023-12-22 RX ORDER — ACETAMINOPHEN 160 MG/5ML
650 SOLUTION ORAL EVERY 4 HOURS PRN
Status: DISCONTINUED | OUTPATIENT
Start: 2023-12-22 | End: 2023-12-26 | Stop reason: HOSPADM

## 2023-12-22 RX ORDER — ATORVASTATIN CALCIUM 80 MG/1
80 TABLET, FILM COATED ORAL NIGHTLY
Status: DISCONTINUED | OUTPATIENT
Start: 2023-12-22 | End: 2023-12-26 | Stop reason: HOSPADM

## 2023-12-22 RX ORDER — INSULIN LISPRO 100 [IU]/ML
0-5 INJECTION, SOLUTION INTRAVENOUS; SUBCUTANEOUS
Status: DISCONTINUED | OUTPATIENT
Start: 2023-12-22 | End: 2023-12-26 | Stop reason: HOSPADM

## 2023-12-22 RX ORDER — BENZONATATE 100 MG/1
100 CAPSULE ORAL 3 TIMES DAILY PRN
Status: DISCONTINUED | OUTPATIENT
Start: 2023-12-22 | End: 2023-12-26 | Stop reason: HOSPADM

## 2023-12-22 RX ORDER — OSELTAMIVIR PHOSPHATE 30 MG/1
30 CAPSULE ORAL ONCE
Status: COMPLETED | OUTPATIENT
Start: 2023-12-22 | End: 2023-12-22

## 2023-12-22 RX ORDER — GUAIFENESIN 600 MG/1
600 TABLET, EXTENDED RELEASE ORAL 2 TIMES DAILY PRN
Status: DISCONTINUED | OUTPATIENT
Start: 2023-12-22 | End: 2023-12-24

## 2023-12-22 RX ORDER — LOSARTAN POTASSIUM 50 MG/1
50 TABLET ORAL 2 TIMES DAILY
Status: DISCONTINUED | OUTPATIENT
Start: 2023-12-22 | End: 2023-12-26 | Stop reason: HOSPADM

## 2023-12-22 RX ORDER — ONDANSETRON HYDROCHLORIDE 2 MG/ML
4 INJECTION, SOLUTION INTRAVENOUS EVERY 8 HOURS PRN
Status: DISCONTINUED | OUTPATIENT
Start: 2023-12-22 | End: 2023-12-26 | Stop reason: HOSPADM

## 2023-12-22 RX ORDER — AMIODARONE HYDROCHLORIDE 200 MG/1
100 TABLET ORAL DAILY
Status: DISCONTINUED | OUTPATIENT
Start: 2023-12-23 | End: 2023-12-26 | Stop reason: HOSPADM

## 2023-12-22 RX ORDER — PANTOPRAZOLE SODIUM 40 MG/1
40 TABLET, DELAYED RELEASE ORAL
Status: DISCONTINUED | OUTPATIENT
Start: 2023-12-23 | End: 2023-12-26 | Stop reason: HOSPADM

## 2023-12-22 RX ORDER — TALC
3 POWDER (GRAM) TOPICAL DAILY
Status: DISCONTINUED | OUTPATIENT
Start: 2023-12-22 | End: 2023-12-26 | Stop reason: HOSPADM

## 2023-12-22 RX ORDER — METOPROLOL SUCCINATE 25 MG/1
12.5 TABLET, EXTENDED RELEASE ORAL DAILY
Status: DISCONTINUED | OUTPATIENT
Start: 2023-12-22 | End: 2023-12-26 | Stop reason: HOSPADM

## 2023-12-22 RX ORDER — CLOPIDOGREL BISULFATE 75 MG/1
75 TABLET ORAL DAILY
Status: DISCONTINUED | OUTPATIENT
Start: 2023-12-23 | End: 2023-12-26 | Stop reason: HOSPADM

## 2023-12-22 RX ADMIN — APIXABAN 2.5 MG: 2.5 TABLET, FILM COATED ORAL at 21:15

## 2023-12-22 RX ADMIN — SODIUM CHLORIDE 250 ML: 9 INJECTION, SOLUTION INTRAVENOUS at 15:45

## 2023-12-22 RX ADMIN — OSELTAMIVIR PHOSPHATE 30 MG: 30 CAPSULE ORAL at 13:58

## 2023-12-22 RX ADMIN — ATORVASTATIN CALCIUM 80 MG: 80 TABLET, FILM COATED ORAL at 21:15

## 2023-12-22 SDOH — SOCIAL STABILITY: SOCIAL NETWORK
IN A TYPICAL WEEK, HOW MANY TIMES DO YOU TALK ON THE PHONE WITH FAMILY, FRIENDS, OR NEIGHBORS?: MORE THAN THREE TIMES A WEEK

## 2023-12-22 SDOH — SOCIAL STABILITY: SOCIAL INSECURITY
WITHIN THE LAST YEAR, HAVE YOU BEEN KICKED, HIT, SLAPPED, OR OTHERWISE PHYSICALLY HURT BY YOUR PARTNER OR EX-PARTNER?: NO

## 2023-12-22 SDOH — SOCIAL STABILITY: SOCIAL INSECURITY: DO YOU FEEL UNSAFE GOING BACK TO THE PLACE WHERE YOU ARE LIVING?: NO

## 2023-12-22 SDOH — SOCIAL STABILITY: SOCIAL INSECURITY: ABUSE: ADULT

## 2023-12-22 SDOH — SOCIAL STABILITY: SOCIAL INSECURITY: HAVE YOU HAD THOUGHTS OF HARMING ANYONE ELSE?: NO

## 2023-12-22 SDOH — ECONOMIC STABILITY: HOUSING INSECURITY
IN THE LAST 12 MONTHS, WAS THERE A TIME WHEN YOU DID NOT HAVE A STEADY PLACE TO SLEEP OR SLEPT IN A SHELTER (INCLUDING NOW)?: NO

## 2023-12-22 SDOH — HEALTH STABILITY: MENTAL HEALTH
STRESS IS WHEN SOMEONE FEELS TENSE, NERVOUS, ANXIOUS, OR CAN'T SLEEP AT NIGHT BECAUSE THEIR MIND IS TROUBLED. HOW STRESSED ARE YOU?: NOT AT ALL

## 2023-12-22 SDOH — SOCIAL STABILITY: SOCIAL INSECURITY
WITHIN THE LAST YEAR, HAVE TO BEEN RAPED OR FORCED TO HAVE ANY KIND OF SEXUAL ACTIVITY BY YOUR PARTNER OR EX-PARTNER?: NO

## 2023-12-22 SDOH — ECONOMIC STABILITY: INCOME INSECURITY: IN THE PAST 12 MONTHS, HAS THE ELECTRIC, GAS, OIL, OR WATER COMPANY THREATENED TO SHUT OFF SERVICE IN YOUR HOME?: NO

## 2023-12-22 SDOH — SOCIAL STABILITY: SOCIAL NETWORK: HOW OFTEN DO YOU ATTENT MEETINGS OF THE CLUB OR ORGANIZATION YOU BELONG TO?: NEVER

## 2023-12-22 SDOH — SOCIAL STABILITY: SOCIAL INSECURITY: HAS ANYONE EVER THREATENED TO HURT YOUR FAMILY OR YOUR PETS?: NO

## 2023-12-22 SDOH — ECONOMIC STABILITY: INCOME INSECURITY: IN THE LAST 12 MONTHS, WAS THERE A TIME WHEN YOU WERE NOT ABLE TO PAY THE MORTGAGE OR RENT ON TIME?: NO

## 2023-12-22 SDOH — HEALTH STABILITY: PHYSICAL HEALTH: ON AVERAGE, HOW MANY MINUTES DO YOU ENGAGE IN EXERCISE AT THIS LEVEL?: 0 MIN

## 2023-12-22 SDOH — SOCIAL STABILITY: SOCIAL INSECURITY: WITHIN THE LAST YEAR, HAVE YOU BEEN AFRAID OF YOUR PARTNER OR EX-PARTNER?: NO

## 2023-12-22 SDOH — SOCIAL STABILITY: SOCIAL NETWORK: HOW OFTEN DO YOU ATTEND CHURCH OR RELIGIOUS SERVICES?: NEVER

## 2023-12-22 SDOH — SOCIAL STABILITY: SOCIAL NETWORK: ARE YOU MARRIED, WIDOWED, DIVORCED, SEPARATED, NEVER MARRIED, OR LIVING WITH A PARTNER?: WIDOWED

## 2023-12-22 SDOH — HEALTH STABILITY: MENTAL HEALTH: HOW OFTEN DO YOU HAVE A DRINK CONTAINING ALCOHOL?: NEVER

## 2023-12-22 SDOH — ECONOMIC STABILITY: FOOD INSECURITY: WITHIN THE PAST 12 MONTHS, THE FOOD YOU BOUGHT JUST DIDN'T LAST AND YOU DIDN'T HAVE MONEY TO GET MORE.: NEVER TRUE

## 2023-12-22 SDOH — SOCIAL STABILITY: SOCIAL INSECURITY: DOES ANYONE TRY TO KEEP YOU FROM HAVING/CONTACTING OTHER FRIENDS OR DOING THINGS OUTSIDE YOUR HOME?: NO

## 2023-12-22 SDOH — ECONOMIC STABILITY: INCOME INSECURITY: HOW HARD IS IT FOR YOU TO PAY FOR THE VERY BASICS LIKE FOOD, HOUSING, MEDICAL CARE, AND HEATING?: NOT HARD AT ALL

## 2023-12-22 SDOH — SOCIAL STABILITY: SOCIAL NETWORK
DO YOU BELONG TO ANY CLUBS OR ORGANIZATIONS SUCH AS CHURCH GROUPS UNIONS, FRATERNAL OR ATHLETIC GROUPS, OR SCHOOL GROUPS?: NO

## 2023-12-22 SDOH — HEALTH STABILITY: MENTAL HEALTH: HOW MANY STANDARD DRINKS CONTAINING ALCOHOL DO YOU HAVE ON A TYPICAL DAY?: PATIENT DOES NOT DRINK

## 2023-12-22 SDOH — HEALTH STABILITY: MENTAL HEALTH: HOW OFTEN DO YOU HAVE 6 OR MORE DRINKS ON ONE OCCASION?: NEVER

## 2023-12-22 SDOH — ECONOMIC STABILITY: TRANSPORTATION INSECURITY
IN THE PAST 12 MONTHS, HAS THE LACK OF TRANSPORTATION KEPT YOU FROM MEDICAL APPOINTMENTS OR FROM GETTING MEDICATIONS?: NO

## 2023-12-22 SDOH — SOCIAL STABILITY: SOCIAL INSECURITY: WERE YOU ABLE TO COMPLETE ALL THE BEHAVIORAL HEALTH SCREENINGS?: YES

## 2023-12-22 SDOH — SOCIAL STABILITY: SOCIAL INSECURITY: WITHIN THE LAST YEAR, HAVE YOU BEEN HUMILIATED OR EMOTIONALLY ABUSED IN OTHER WAYS BY YOUR PARTNER OR EX-PARTNER?: NO

## 2023-12-22 SDOH — SOCIAL STABILITY: SOCIAL NETWORK: HOW OFTEN DO YOU GET TOGETHER WITH FRIENDS OR RELATIVES?: MORE THAN THREE TIMES A WEEK

## 2023-12-22 SDOH — SOCIAL STABILITY: SOCIAL INSECURITY: DO YOU FEEL ANYONE HAS EXPLOITED OR TAKEN ADVANTAGE OF YOU FINANCIALLY OR OF YOUR PERSONAL PROPERTY?: NO

## 2023-12-22 SDOH — ECONOMIC STABILITY: FOOD INSECURITY: WITHIN THE PAST 12 MONTHS, YOU WORRIED THAT YOUR FOOD WOULD RUN OUT BEFORE YOU GOT MONEY TO BUY MORE.: NEVER TRUE

## 2023-12-22 SDOH — ECONOMIC STABILITY: TRANSPORTATION INSECURITY
IN THE PAST 12 MONTHS, HAS LACK OF TRANSPORTATION KEPT YOU FROM MEETINGS, WORK, OR FROM GETTING THINGS NEEDED FOR DAILY LIVING?: NO

## 2023-12-22 SDOH — HEALTH STABILITY: PHYSICAL HEALTH: ON AVERAGE, HOW MANY DAYS PER WEEK DO YOU ENGAGE IN MODERATE TO STRENUOUS EXERCISE (LIKE A BRISK WALK)?: 0 DAYS

## 2023-12-22 SDOH — SOCIAL STABILITY: SOCIAL INSECURITY: ARE YOU OR HAVE YOU BEEN THREATENED OR ABUSED PHYSICALLY, EMOTIONALLY, OR SEXUALLY BY ANYONE?: NO

## 2023-12-22 SDOH — ECONOMIC STABILITY: HOUSING INSECURITY: IN THE LAST 12 MONTHS, HOW MANY PLACES HAVE YOU LIVED?: 1

## 2023-12-22 SDOH — SOCIAL STABILITY: SOCIAL INSECURITY: ARE THERE ANY APPARENT SIGNS OF INJURIES/BEHAVIORS THAT COULD BE RELATED TO ABUSE/NEGLECT?: NO

## 2023-12-22 ASSESSMENT — COGNITIVE AND FUNCTIONAL STATUS - GENERAL
TOILETING: A LITTLE
STANDING UP FROM CHAIR USING ARMS: A LITTLE
DRESSING REGULAR LOWER BODY CLOTHING: A LITTLE
MOBILITY SCORE: 20
HELP NEEDED FOR BATHING: A LITTLE
CLIMB 3 TO 5 STEPS WITH RAILING: A LITTLE
WALKING IN HOSPITAL ROOM: A LITTLE
MOVING TO AND FROM BED TO CHAIR: A LITTLE
PATIENT BASELINE BEDBOUND: NO
DAILY ACTIVITIY SCORE: 20
DRESSING REGULAR UPPER BODY CLOTHING: A LITTLE

## 2023-12-22 ASSESSMENT — ENCOUNTER SYMPTOMS
CHILLS: 1
SPEECH DIFFICULTY: 0
ABDOMINAL PAIN: 0
SORE THROAT: 0
FATIGUE: 1
DIARRHEA: 0
NAUSEA: 0
AGITATION: 0
WEAKNESS: 1
DIZZINESS: 0
PALPITATIONS: 0
APPETITE CHANGE: 1
FEVER: 1
VOMITING: 0
COUGH: 1
WHEEZING: 0
CONFUSION: 0
CHEST TIGHTNESS: 0
HEADACHES: 0
SHORTNESS OF BREATH: 0
BACK PAIN: 1

## 2023-12-22 ASSESSMENT — COLUMBIA-SUICIDE SEVERITY RATING SCALE - C-SSRS
2. HAVE YOU ACTUALLY HAD ANY THOUGHTS OF KILLING YOURSELF?: NO
1. IN THE PAST MONTH, HAVE YOU WISHED YOU WERE DEAD OR WISHED YOU COULD GO TO SLEEP AND NOT WAKE UP?: NO
6. HAVE YOU EVER DONE ANYTHING, STARTED TO DO ANYTHING, OR PREPARED TO DO ANYTHING TO END YOUR LIFE?: NO

## 2023-12-22 ASSESSMENT — PAIN SCALES - GENERAL
PAINLEVEL_OUTOF10: 0 - NO PAIN

## 2023-12-22 ASSESSMENT — PAIN - FUNCTIONAL ASSESSMENT
PAIN_FUNCTIONAL_ASSESSMENT: 0-10
PAIN_FUNCTIONAL_ASSESSMENT: 0-10

## 2023-12-22 ASSESSMENT — LIFESTYLE VARIABLES
AUDIT-C TOTAL SCORE: 0
SKIP TO QUESTIONS 9-10: 1
HOW OFTEN DO YOU HAVE 6 OR MORE DRINKS ON ONE OCCASION: NEVER
SUBSTANCE_ABUSE_PAST_12_MONTHS: NO
AUDIT-C TOTAL SCORE: 0
HOW MANY STANDARD DRINKS CONTAINING ALCOHOL DO YOU HAVE ON A TYPICAL DAY: PATIENT DOES NOT DRINK
HOW OFTEN DO YOU HAVE A DRINK CONTAINING ALCOHOL: NEVER
PRESCIPTION_ABUSE_PAST_12_MONTHS: NO
AUDIT-C TOTAL SCORE: 0
SKIP TO QUESTIONS 9-10: 1

## 2023-12-22 ASSESSMENT — PATIENT HEALTH QUESTIONNAIRE - PHQ9
2. FEELING DOWN, DEPRESSED OR HOPELESS: NOT AT ALL
1. LITTLE INTEREST OR PLEASURE IN DOING THINGS: NOT AT ALL
SUM OF ALL RESPONSES TO PHQ9 QUESTIONS 1 & 2: 0

## 2023-12-22 ASSESSMENT — ACTIVITIES OF DAILY LIVING (ADL)
HEARING - LEFT EAR: FUNCTIONAL
ADEQUATE_TO_COMPLETE_ADL: YES
WALKS IN HOME: INDEPENDENT
BATHING: NEEDS ASSISTANCE
TOILETING: INDEPENDENT
PATIENT'S MEMORY ADEQUATE TO SAFELY COMPLETE DAILY ACTIVITIES?: YES
HEARING - RIGHT EAR: FUNCTIONAL
DRESSING YOURSELF: INDEPENDENT
GROOMING: INDEPENDENT
LACK_OF_TRANSPORTATION: NO
JUDGMENT_ADEQUATE_SAFELY_COMPLETE_DAILY_ACTIVITIES: YES
FEEDING YOURSELF: INDEPENDENT

## 2023-12-22 NOTE — ED PROVIDER NOTES
History of Present Illness   CC: Weakness, Gen     History provided by: Patient and Family Member  Limitations to History: None    HPI:  Shona Montgomery is a 89 y.o. female with extensive cardiac history including prior multivessel CABG, A-fib on Eliquis, hyperlipidemia, hypertension, diabetes, stage III CKD presenting to the emergency department with flulike symptoms, was seen at urgent care earlier this week, noted to have cough, generalized malaise, swab for COVID, flu, RSV, swab still pending at this time, has been having progressive worsening generalized weakness, feels generally uncomfortable with mild chest discomfort.  Presents today with worsening symptoms.  Denies any orthopnea, lower extremity swelling.  He is compliant with medications, only missed 1 dose of Lasix on Monday.  Denies any significant shortness of breath at this time.    External Records Reviewed: Reviewed progress note from PCP on 11/29/2023.  Saw cardiologist on 12/18/2023.    Physical Exam   Triage vitals:  T 36.3 °C (97.4 °F)  HR 71  /66  RR 18  O2 95 %      Vital signs reviewed in nursing triage note, EMR flow sheets, and at patient's bedside.   General: Awake, alert, thin, frail-appearing  Eyes: Gaze conjugate.  No scleral icterus or injection  HENT: Normo-cephalic, atraumatic. No stridor.  CV: Irregular rhythm. Radial, DP pulses 2+ bilaterally.  No lower extremity edema bilaterally  Resp: Breathing non-labored, speaking in full sentences.  Clear to auscultation bilaterally.  No wheezes, rales, rhonchi.  No respiratory distress  GI: Soft, non-distended, non-tender. No rebound or guarding.  MSK/Extremities: No gross bony deformities. Moving all extremities  Skin: Warm. Appropriate color  Neuro: Alert. Oriented. Face symmetric. Speech is fluent.  Gross strength and sensation intact in b/l UE and LEs  Psych: Appropriate mood and affect    ED Course & Medical Decision Making   ED Course:  ED Course as of 12/22/23 1721   Fri Dec 22,  2023   1154 ECG 12 lead  EKG obtained at 1141 demonstrate normal sinus rhythm with rate of 69, normal axis, normal intervals.  There are ST segment elevations in aVR with ST depressions in lead I, T wave inversion in aVL as well as the lateral leads.  No precordial lead changes, mild depression in the inferior leads.  It appears roughly the same as prior EKG from December 2023, but with worsening ST depression lead I, slightly more elevation in aVR. [SH]   1202 XR chest 1 view  Chest x-ray reviewed, independently interpreted, no evidence of acute cardiopulmonary pathology. [SH]   1255 CBC and Auto Differential(!)  Normal blood cell count, mild anemia, normal platelets [SH]   1310 Comprehensive metabolic panel(!)  Normal glucose, normal electrolytes, creatinine mildly elevated consistent with CKD 3, LFTs normal [SH]   1319 Sars-CoV-2 and Influenza A/B PCR(!)  Flu positive [SH]   1319 B-Type Natriuretic Peptide(!)  Mild BNP elevation [SH]   1338 Troponin Series, (0, 1 HR)(!)  Mild troponin elevation [SH]   1435 ECG 12 lead  Repeat EKG obtained at 1221 demonstrate normal sinus rhythm with rate of 63, normal axis, normal intervals, mild persistent T wave inversion and ST depression in lead I, decreased ST segment elevation in aVR, otherwise improved compared to prior EKG. [SH]      ED Course User Index  [SH] Michele Garcia MD         Diagnoses as of 12/22/23 1721   Influenza A   Troponin level elevated       Differential diagnoses considered include but are not limited to: ACS, COVID, flu, pneumonia, CHF, less likely PE or aortic dissection    Social Determinants Limiting Care: None identified    MDM:  89 y.o. female with extensive cardiac history including prior CABG presenting to the emergency department with approximately 1 week of COVID and flu type symptoms, swabbed at urgent care recently, continuing to worsen at home while pending results.  On arrival vital signs within normal limits, but patient uncomfortable.   Family reports hypotensive at home with fever earlier today.  Low suspicion for acute bacterial infection, do not feel that she is septic at this time, do not feel she requires blood cultures antibiotics at this time.  EKG initially is concerning for possible mild elevation of ST segment in aVR with mild reciprocal depressions which can be seen in either global demand or acute coronary syndrome.  Will obtain serial troponins, will obtain repeat EKG.  Not having active chest pain at this time, will not activate as a STEMI, but currently pending repeat EKG.    Repeat EKG without signs of STEMI.  Labs demonstrated mild troponin elevation, mild BNP elevation, flu positive.  She otherwise remained stable, not requiring supplemental oxygen.  Chest x-ray unremarkable.  Will trend her troponins.  Given her flu positivity and evidence of cardiac stress, we will admit her for observation.  Will start on Tamiflu.  Family agreeable to this plan of care.  Patient discussed with the observation AYALA and accepted.    Disposition   As a result of their workup, the patient will require admission to the hospital.  The patient was informed of their diagnosis.  Patient was given the opportunity to ask questions and answered them.  Patient agreed to be admitted to the hospital.    Procedures   Procedures    Patient seen and discussed with ED attending physician.    Matthias Garcia MD  PGY 3 Emergency Medicine  ________________________________________________________________________    ED attending attestation note:    This patient was seen by resident.  I have personally performed a substantive portion of the encounter.  I have seen and examined the patient; agree with the workup, evaluation, MDM, management and diagnosis.  The care plan has been discussed.       Patient seen with resident.  Romain PARIS 89 years old several days upper respiratory cough congestion URI symptomatology.  Fatigue.  Dyspneic.  Exam shows rhonchi, scattered  expiratory wheeze but no overt respiratory distress.  Vital signs relatively stable except for low-grade temperature.  Influenza positive.  Given age, comorbidities, recommend hospitalization.  Tamiflu initiated.  Hospitalize.          Vito Bosch MD,MPH  [facility] Emergency Department  [phone]   _________________________________________________________________________      Michele Garcia MD  Resident  12/22/23 4253

## 2023-12-22 NOTE — PROGRESS NOTES
"Transitional Care Coordination Progress Note:  Plan per Medical/Surgical team: treatment of FLU A positive with tamiflu, room air  Status:observation  Payor source: medicare A/B  Discharge disposition: Home alone during day, family for dinner & overnight every night  Reinforced observation status,discussed HHC options, patient states they are \"useless\", has medically professional adult children involved  Potential Barriers: renal 38/1.56, trop 39  ADOD: 12/23/2023  KAMALJIT Benoit RN, BSN Transitional Care Coordinator ED# [phone]      12/22/23 1424   Discharge Planning   Living Arrangements Alone   Support Systems Children   Assistance Needed flu symptom treatment, trend troponins, monitor renal function   Type of Residence Private residence   Number of Stairs to Enter Residence 3   Number of Stairs Within Residence 12  (lives on first floor)   Do you have animals or pets at home? No   Home or Post Acute Services None   Patient expects to be discharged to: Home alone during day, family for dinner & overnight every night   Does the patient need discharge transport arranged? Yes   RoundTrip coordination needed? Yes   Has discharge transport been arranged? No   Financial Resource Strain   How hard is it for you to pay for the very basics like food, housing, medical care, and heating? Not hard   Housing Stability   In the last 12 months, was there a time when you were not able to pay the mortgage or rent on time? N   In the last 12 months, how many places have you lived? 1   In the last 12 months, was there a time when you did not have a steady place to sleep or slept in a shelter (including now)? N   Transportation Needs   In the past 12 months, has lack of transportation kept you from medical appointments or from getting medications? no   In the past 12 months, has lack of transportation kept you from meetings, work, or from getting things needed for daily living? No       "

## 2023-12-22 NOTE — PROGRESS NOTES
12/22/23 1424   [facility] Disability Status   Are you deaf or do you have serious difficulty hearing? N   Are you blind or do you have serious difficulty seeing, even when wearing glasses? Y  (catarcts- plans for surgery)   Because of a physical, mental, or emotional condition, do you have serious difficulty concentrating, remembering, or making decisions? (5 years old or older) N   Do you have serious difficulty walking or climbing stairs? N   Do you have serious difficulty dressing or bathing? N   Because of a physical, mental, or emotional condition, do you have serious difficulty doing errands alone such as visiting the doctor? Y

## 2023-12-22 NOTE — H&P
"History Of Present Illness  Shona Montgomery is a 89 y.o. female PMHx HTN, HLD, CKD3, A fib on eliquis, CAD s/p CABG, Hx NSTEMI, T2DM presenting with weakness and found to be positive for influenza. Patient was in her normal state of health until yesterday morning. She reports fever, chills and extreme fatigue. Reports reduced appetite with very little fluid intake over the past 24 hours. Denies nausea, vomiting, abdominal pain. She does have a productive cough, however denies sob, wheezing, chest pain, pleuritic chest pain. Reports exhaustion, was so fatigued she had trouble dressing herself this am. Denies prior hx asthma, COPD. Sick contact with daughter-in-law. Note, took her am meds as prescribed. Reports takes lasix 20 mg MWF, took this morning.     ED workup: BP (!) 136/93   Pulse 84   Temp 37.2 °C (99 °F)   Resp 16   Ht 1.6 m (5' 3\")   Wt 50.8 kg (112 lb)   LMP  (LMP Unknown)   SpO2 97%   BMI 19.84 kg/m²   EKG NSR HR 69 nonspecific ST segment changes noted V1 V2; CXR negative for infiltrate or effusion; trop 39 CBC no leukocytosis, hgb 10.6 (at baseline), no thrombocytopenia; CMP Cr 1.56 (baseline 1.2-1.4) BUN 38, LFTs wnl, electrolytes wnl;  (was 378 last month); troponin 39 (was 32 last month); influenza A +       Past Medical History  Past Medical History:   Diagnosis Date    Atherosclerotic heart disease of native coronary artery without angina pectoris 04/20/2022    Atherosclerotic heart disease of native coronary artery without angina pectoris    Personal history of other diseases of the circulatory system 09/16/2013    History of hypertension       Surgical History  Past Surgical History:   Procedure Laterality Date    CORONARY ARTERY BYPASS GRAFT  08/08/2016    CABG    TOTAL HIP ARTHROPLASTY  08/28/2014    Total Hip Replacement        Social History  She reports that she has never smoked. She has never used smokeless tobacco. No history on file for alcohol use and drug use.    Family " History  Family History   Problem Relation Name Age of Onset    No Known Problems Mother      No Known Problems Father          Allergies  Tramadol    Review of Systems   Constitutional:  Positive for appetite change, chills, fatigue and fever.   HENT:  Negative for congestion and sore throat.    Eyes:  Negative for visual disturbance.   Respiratory:  Positive for cough. Negative for chest tightness, shortness of breath and wheezing.    Cardiovascular:  Negative for chest pain, palpitations and leg swelling.   Gastrointestinal:  Negative for abdominal pain, diarrhea, nausea and vomiting.   Musculoskeletal:  Positive for back pain.   Neurological:  Positive for weakness. Negative for dizziness, speech difficulty and headaches.   Psychiatric/Behavioral:  Negative for agitation and confusion.         Physical Exam  Constitutional:       General: She is not in acute distress.     Appearance: She is ill-appearing. She is not toxic-appearing.   HENT:      Head: Normocephalic and atraumatic.      Right Ear: External ear normal.      Left Ear: External ear normal.      Nose: Nose normal. No congestion.      Mouth/Throat:      Mouth: Mucous membranes are moist.      Pharynx: Oropharynx is clear.   Eyes:      General:         Right eye: No discharge.         Left eye: No discharge.      Conjunctiva/sclera: Conjunctivae normal.      Pupils: Pupils are equal, round, and reactive to light.   Cardiovascular:      Rate and Rhythm: Normal rate and regular rhythm.      Heart sounds: No murmur heard.     No gallop.   Pulmonary:      Effort: Pulmonary effort is normal.      Breath sounds: Rhonchi (expiratory) present. No wheezing or rales.   Abdominal:      General: Abdomen is flat. Bowel sounds are normal.      Palpations: Abdomen is soft.      Tenderness: There is no abdominal tenderness. There is no guarding or rebound.   Musculoskeletal:         General: No swelling or tenderness. Normal range of motion.      Right lower leg: No  "edema.      Left lower leg: No edema.      Comments: No LE edema noted   Skin:     General: Skin is warm and dry.      Findings: No erythema or rash.   Neurological:      General: No focal deficit present.      Mental Status: She is alert.      Cranial Nerves: No cranial nerve deficit.      Motor: No weakness.   Psychiatric:         Mood and Affect: Mood normal.         Thought Content: Thought content normal.          Last Recorded Vitals  Blood pressure (!) 136/93, pulse 84, temperature 37.2 °C (99 °F), resp. rate 16, height 1.6 m (5' 3\"), weight 50.8 kg (112 lb), SpO2 97 %.    Relevant Results    Scheduled medications  [START ON 12/23/2023] amiodarone, 100 mg, oral, Daily  [START ON 12/23/2023] amLODIPine, 2.5 mg, oral, Daily  apixaban, 2.5 mg, oral, BID  atorvastatin, 80 mg, oral, Nightly  [START ON 12/23/2023] clopidogrel, 75 mg, oral, Daily  docusate sodium, 100 mg, oral, Daily  [START ON 12/23/2023] furosemide, 20 mg, oral, Daily  [START ON 12/23/2023] isosorbide mononitrate ER, 90 mg, oral, Daily  [Held by provider] losartan, 50 mg, oral, BID  melatonin, 3 mg, oral, Daily  [START ON 12/23/2023] metoprolol succinate XL, 12.5 mg, oral, Daily  [START ON 12/23/2023] oseltamivir, 30 mg, oral, Daily  [START ON 12/23/2023] pantoprazole, 40 mg, oral, Daily before breakfast   Or  [START ON 12/23/2023] pantoprazole, 40 mg, intravenous, Daily before breakfast  polyethylene glycol, 17 g, oral, Daily  sodium chloride, 250 mL, intravenous, Once  timolol, 1 drop, Left Eye, BID      Continuous medications     PRN medications  PRN medications: acetaminophen **OR** acetaminophen **OR** acetaminophen, albuterol, benzonatate, guaiFENesin, nitroglycerin, ondansetron ODT **OR** ondansetron  Results for orders placed or performed during the hospital encounter of 12/22/23 (from the past 24 hour(s))   ECG 12 lead   Result Value Ref Range    Ventricular Rate 69 BPM    Atrial Rate 69 BPM    OH Interval 144 ms    QRS Duration 100 ms    " QT Interval 392 ms    QTC Calculation(Bazett) 420 ms    P Axis 75 degrees    R Axis 86 degrees    T Axis 201 degrees    QRS Count 11 beats    Q Onset 220 ms    P Onset 148 ms    P Offset 208 ms    T Offset 416 ms    QTC Fredericia 410 ms   CBC and Auto Differential   Result Value Ref Range    WBC 5.4 4.4 - 11.3 x10*3/uL    nRBC 0.0 0.0 - 0.0 /100 WBCs    RBC 3.48 (L) 4.00 - 5.20 x10*6/uL    Hemoglobin 10.6 (L) 12.0 - 16.0 g/dL    Hematocrit 33.1 (L) 36.0 - 46.0 %    MCV 95 80 - 100 fL    MCH 30.5 26.0 - 34.0 pg    MCHC 32.0 32.0 - 36.0 g/dL    RDW 13.7 11.5 - 14.5 %    Platelets 197 150 - 450 x10*3/uL    Neutrophils % 73.3 40.0 - 80.0 %    Immature Granulocytes %, Automated 0.2 0.0 - 0.9 %    Lymphocytes % 15.3 13.0 - 44.0 %    Monocytes % 10.4 2.0 - 10.0 %    Eosinophils % 0.4 0.0 - 6.0 %    Basophils % 0.4 0.0 - 2.0 %    Neutrophils Absolute 3.94 1.60 - 5.50 x10*3/uL    Immature Granulocytes Absolute, Automated 0.01 0.00 - 0.50 x10*3/uL    Lymphocytes Absolute 0.82 0.80 - 3.00 x10*3/uL    Monocytes Absolute 0.56 0.05 - 0.80 x10*3/uL    Eosinophils Absolute 0.02 0.00 - 0.40 x10*3/uL    Basophils Absolute 0.02 0.00 - 0.10 x10*3/uL   Comprehensive metabolic panel   Result Value Ref Range    Glucose 154 (H) 74 - 99 mg/dL    Sodium 136 136 - 145 mmol/L    Potassium 4.2 3.5 - 5.3 mmol/L    Chloride 103 98 - 107 mmol/L    Bicarbonate 26 21 - 32 mmol/L    Anion Gap 11 10 - 20 mmol/L    Urea Nitrogen 38 (H) 6 - 23 mg/dL    Creatinine 1.56 (H) 0.50 - 1.05 mg/dL    eGFR 31 (L) >60 mL/min/1.73m*2    Calcium 9.2 8.6 - 10.3 mg/dL    Albumin 3.7 3.4 - 5.0 g/dL    Alkaline Phosphatase 89 33 - 136 U/L    Total Protein 6.3 (L) 6.4 - 8.2 g/dL    AST 19 9 - 39 U/L    Bilirubin, Total 0.7 0.0 - 1.2 mg/dL    ALT 17 7 - 45 U/L   Magnesium   Result Value Ref Range    Magnesium 2.10 1.60 - 2.40 mg/dL   Sars-CoV-2 and Influenza A/B PCR   Result Value Ref Range    Flu A Result Detected (A) Not Detected    Flu B Result Not Detected Not  Detected    Coronavirus 2019, PCR Not Detected Not Detected   B-Type Natriuretic Peptide   Result Value Ref Range     (H) 0 - 99 pg/mL   RSV PCR   Result Value Ref Range    RSV PCR Not Detected Not Detected   Troponin I, High Sensitivity, Initial   Result Value Ref Range    Troponin I, High Sensitivity 39 (H) 0 - 13 ng/L   Troponin I, High Sensitivity, Initial   Result Value Ref Range    Troponin I, High Sensitivity 37 (H) 0 - 13 ng/L     ECG 12 lead    Result Date: 12/22/2023  Normal sinus rhythm ST & T wave abnormality, consider lateral ischemia Abnormal ECG When compared with ECG of 18-DEC-2023 14:43, Questionable change in QRS axis ST now depressed in Inferior leads Non-specific change in ST segment in Anterior leads Nonspecific T wave abnormality now evident in Inferior leads    XR chest 1 view    Result Date: 12/22/2023  STUDY: Chest Radiograph;  12/22/2023 11:10 AM. INDICATION: General weakness. COMPARISON: 04/05/2022 XR Chest 1 View, 04/02/2021 XR Chest 1 View. ACCESSION NUMBER(S): [id number] ORDERING CLINICIAN: RENO KRISHNAN TECHNIQUE:  Frontal chest was obtained at 1209 hours. FINDINGS: CARDIOMEDIASTINAL SILHOUETTE: The patient status post median sternotomy.  Cardiomediastinal silhouette is normal in size and configuration.  LUNGS: Lungs are clear.  ABDOMEN: No remarkable upper abdominal findings.  BONES: No acute osseous changes.    No evidence consolidating infiltrate or effusion. Signed by Bandar Car MD    ECG 12 lead (Clinic Performed)    Result Date: 12/18/2023  Sinus bradycardia Possible Left atrial enlargement T wave abnormality, consider lateral ischemia Abnormal ECG Confirmed by Mohsen Mendiola ([id number]) on 12/18/2023 5:37:58 PM            Assessment/Plan   Principal Problem:    Influenza HILARIO Montgomery is a 89 y.o. female PMHx HTN, HLD, CKD3, A fib on eliquis, CAD s/p CABG, Hx NSTEMI, T2DM presenting with weakness and found to be positive for influenza.    ED workup: BP (!) 136/93    "Pulse 84   Temp 37.2 °C (99 °F)   Resp 16   Ht 1.6 m (5' 3\")   Wt 50.8 kg (112 lb)   LMP  (LMP Unknown)   SpO2 97%   BMI 19.84 kg/m²   EKG NSR HR 69 nonspecific ST segment changes noted V1 V2; CXR negative for infiltrate or effusion; trop 39 CBC no leukocytosis, hgb 10.6 (at baseline), no thrombocytopenia; CMP Cr 1.56 (baseline 1.2-1.4) BUN 38, LFTs wnl, electrolytes wnl;  (was 378 last month); troponin 39 (was 32 last month); influenza A +    Influenza A  Weakness  Dehydration  -Patient does appear somewhat dehydrated on exam, BUN/Cr elevated iso of decreased oral intake. Will give 250 mL NS bolus and pay close attention to fluid stating iso of HFrEF.  -CXR negative for infiltrate effusion   -tamiflu renal dosing   -mucinex, tessalon  -albuterol nebulizer  -IS  -diabetic diet    A.fib on eliquis  HFrEF  HTN  CAD s/p CABG  -no chest pain, sob, palpitations  -EKG NSR HR 69 nonspecific ST segment changes noted V1V2  -continue amiodarone, eliquis  -trop 39, repeat pending     -was 32 last month  -     -was 378 last month  -telemetry  -continue furosemide     -currently takes MWF, will increase to daily considering elevated BNP  -reviewed ECHO 04/03/2021    -LVEF 45-50%    -inferolateral wall abnormality    -diastolic dysfunction   -discussed with Cards NP Patt Miller, consider cards consult if symptomatic    T2DM  -on insulin degludec 5 U HS  -will hold iso of decreased PO  -accucheck, hypoglycemia order set  -SS lispro    CKD 3  -close to baseline   -avoid nephrotoxic meds  -trend BMP    GI ppx:  -PPI, bowel regimen    DVT Ppx;  -cont eliqui               I spent 60 minutes in the professional and overall care of this patient.      Ramone Car PA-C    "

## 2023-12-22 NOTE — PROGRESS NOTES
Home alone during day, family for dinner & overnight every night     12/22/23 0448   Current Planned Discharge Disposition   Current Planned Discharge Disposition Home

## 2023-12-22 NOTE — ED TRIAGE NOTES
Pt went to urgent care yesterday and was tested for flu-like symptoms and while awaiting results they were told to come to the ER if she got worse. Pt has been taking tylenol every 6 hours for fever control and last dose was at 0930 this morning. Pt today states that she is just increasingly weak and is too fatigued to get up and out of bed.

## 2023-12-22 NOTE — PROGRESS NOTES
12/22/23 1428   Physical Activity   On average, how many days per week do you engage in moderate to strenuous exercise (like a brisk walk)? 0 days   On average, how many minutes do you engage in exercise at this level? 0 min   Financial Resource Strain   How hard is it for you to pay for the very basics like food, housing, medical care, and heating? Not hard   Housing Stability   In the last 12 months, was there a time when you were not able to pay the mortgage or rent on time? N   In the last 12 months, how many places have you lived? 1   In the last 12 months, was there a time when you did not have a steady place to sleep or slept in a shelter (including now)? N   Transportation Needs   In the past 12 months, has lack of transportation kept you from medical appointments or from getting medications? no   In the past 12 months, has lack of transportation kept you from meetings, work, or from getting things needed for daily living? No   Food Insecurity   Within the past 12 months, you worried that your food would run out before you got the money to buy more. Never true   Within the past 12 months, the food you bought just didn't last and you didn't have money to get more. Never true   Stress   Do you feel stress - tense, restless, nervous, or anxious, or unable to sleep at night because your mind is troubled all the time - these days? Not at all   Social Connections   In a typical week, how many times do you talk on the phone with family, friends, or neighbors? More than 3   How often do you get together with friends or relatives? More than 3   How often do you attend Gnosticism or Gnosticist services? Never   Do you belong to any clubs or organizations such as Gnosticism groups, unions, fraternal or athletic groups, or school groups? No   How often do you attend meetings of the clubs or organizations you belong to? Never   Are you , , , , never , or living with a partner?     Intimate Partner Violence   Within the last year, have you been afraid of your partner or ex-partner? No   Within the last year, have you been humiliated or emotionally abused in other ways by your partner or ex-partner? No   Within the last year, have you been kicked, hit, slapped, or otherwise physically hurt by your partner or ex-partner? No   Within the last year, have you been raped or forced to have any kind of sexual activity by your partner or ex-partner? No   Alcohol Use   Q1: How often do you have a drink containing alcohol? Never   Q2: How many drinks containing alcohol do you have on a typical day when you are drinking? None   Q3: How often do you have six or more drinks on one occasion? Never   Utilities   In the past 12 months has the electric, gas, oil, or water company threatened to shut off services in your home? No

## 2023-12-23 ENCOUNTER — APPOINTMENT (OUTPATIENT)
Dept: RADIOLOGY | Facility: HOSPITAL | Age: 88
DRG: 193 | End: 2023-12-23
Payer: MEDICARE

## 2023-12-23 LAB
ANION GAP SERPL CALC-SCNC: 13 MMOL/L (ref 10–20)
ANION GAP SERPL CALC-SCNC: 17 MMOL/L (ref 10–20)
BUN SERPL-MCNC: 30 MG/DL (ref 6–23)
BUN SERPL-MCNC: 34 MG/DL (ref 6–23)
CALCIUM SERPL-MCNC: 8.2 MG/DL (ref 8.6–10.3)
CALCIUM SERPL-MCNC: 9.5 MG/DL (ref 8.6–10.3)
CARDIAC TROPONIN I PNL SERPL HS: 54 NG/L (ref 0–13)
CHLORIDE SERPL-SCNC: 104 MMOL/L (ref 98–107)
CHLORIDE SERPL-SCNC: 99 MMOL/L (ref 98–107)
CO2 SERPL-SCNC: 20 MMOL/L (ref 21–32)
CO2 SERPL-SCNC: 22 MMOL/L (ref 21–32)
CREAT SERPL-MCNC: 1.42 MG/DL (ref 0.5–1.05)
CREAT SERPL-MCNC: 1.67 MG/DL (ref 0.5–1.05)
ERYTHROCYTE [DISTWIDTH] IN BLOOD BY AUTOMATED COUNT: 13.6 % (ref 11.5–14.5)
ERYTHROCYTE [DISTWIDTH] IN BLOOD BY AUTOMATED COUNT: 13.7 % (ref 11.5–14.5)
GFR SERPL CREATININE-BSD FRML MDRD: 28 ML/MIN/1.73M*2
GFR SERPL CREATININE-BSD FRML MDRD: 34 ML/MIN/1.73M*2
GLUCOSE BLD MANUAL STRIP-MCNC: 153 MG/DL (ref 74–99)
GLUCOSE BLD MANUAL STRIP-MCNC: 218 MG/DL (ref 74–99)
GLUCOSE BLD MANUAL STRIP-MCNC: 232 MG/DL (ref 74–99)
GLUCOSE SERPL-MCNC: 132 MG/DL (ref 74–99)
GLUCOSE SERPL-MCNC: 189 MG/DL (ref 74–99)
HCT VFR BLD AUTO: 32.4 % (ref 36–46)
HCT VFR BLD AUTO: 35 % (ref 36–46)
HGB BLD-MCNC: 10.3 G/DL (ref 12–16)
HGB BLD-MCNC: 11.6 G/DL (ref 12–16)
MAGNESIUM SERPL-MCNC: 1.7 MG/DL (ref 1.6–2.4)
MCH RBC QN AUTO: 30.6 PG (ref 26–34)
MCH RBC QN AUTO: 31 PG (ref 26–34)
MCHC RBC AUTO-ENTMCNC: 31.8 G/DL (ref 32–36)
MCHC RBC AUTO-ENTMCNC: 33.1 G/DL (ref 32–36)
MCV RBC AUTO: 92 FL (ref 80–100)
MCV RBC AUTO: 98 FL (ref 80–100)
NRBC BLD-RTO: 0 /100 WBCS (ref 0–0)
NRBC BLD-RTO: 0 /100 WBCS (ref 0–0)
PLATELET # BLD AUTO: 161 X10*3/UL (ref 150–450)
PLATELET # BLD AUTO: 193 X10*3/UL (ref 150–450)
POTASSIUM SERPL-SCNC: 3.2 MMOL/L (ref 3.5–5.3)
POTASSIUM SERPL-SCNC: 3.5 MMOL/L (ref 3.5–5.3)
RBC # BLD AUTO: 3.32 X10*6/UL (ref 4–5.2)
RBC # BLD AUTO: 3.79 X10*6/UL (ref 4–5.2)
SODIUM SERPL-SCNC: 134 MMOL/L (ref 136–145)
SODIUM SERPL-SCNC: 134 MMOL/L (ref 136–145)
WBC # BLD AUTO: 5.2 X10*3/UL (ref 4.4–11.3)
WBC # BLD AUTO: 6.3 X10*3/UL (ref 4.4–11.3)

## 2023-12-23 PROCEDURE — 2500000002 HC RX 250 W HCPCS SELF ADMINISTERED DRUGS (ALT 637 FOR MEDICARE OP, ALT 636 FOR OP/ED): Performed by: PHYSICIAN ASSISTANT

## 2023-12-23 PROCEDURE — 2500000004 HC RX 250 GENERAL PHARMACY W/ HCPCS (ALT 636 FOR OP/ED): Performed by: HOSPITALIST

## 2023-12-23 PROCEDURE — 84145 PROCALCITONIN (PCT): CPT | Mod: AHULAB | Performed by: PHYSICIAN ASSISTANT

## 2023-12-23 PROCEDURE — 2500000004 HC RX 250 GENERAL PHARMACY W/ HCPCS (ALT 636 FOR OP/ED): Performed by: PHYSICIAN ASSISTANT

## 2023-12-23 PROCEDURE — 2500000001 HC RX 250 WO HCPCS SELF ADMINISTERED DRUGS (ALT 637 FOR MEDICARE OP): Performed by: PHYSICIAN ASSISTANT

## 2023-12-23 PROCEDURE — 83735 ASSAY OF MAGNESIUM: CPT | Performed by: PHYSICIAN ASSISTANT

## 2023-12-23 PROCEDURE — 2500000001 HC RX 250 WO HCPCS SELF ADMINISTERED DRUGS (ALT 637 FOR MEDICARE OP): Performed by: HOSPITALIST

## 2023-12-23 PROCEDURE — 36415 COLL VENOUS BLD VENIPUNCTURE: CPT | Performed by: PHYSICIAN ASSISTANT

## 2023-12-23 PROCEDURE — C9113 INJ PANTOPRAZOLE SODIUM, VIA: HCPCS | Performed by: PHYSICIAN ASSISTANT

## 2023-12-23 PROCEDURE — 85027 COMPLETE CBC AUTOMATED: CPT | Performed by: PHYSICIAN ASSISTANT

## 2023-12-23 PROCEDURE — 99221 1ST HOSP IP/OBS SF/LOW 40: CPT | Performed by: INTERNAL MEDICINE

## 2023-12-23 PROCEDURE — 99223 1ST HOSP IP/OBS HIGH 75: CPT | Performed by: NURSE PRACTITIONER

## 2023-12-23 PROCEDURE — 80048 BASIC METABOLIC PNL TOTAL CA: CPT | Performed by: PHYSICIAN ASSISTANT

## 2023-12-23 PROCEDURE — 1200000002 HC GENERAL ROOM WITH TELEMETRY DAILY

## 2023-12-23 PROCEDURE — 82947 ASSAY GLUCOSE BLOOD QUANT: CPT

## 2023-12-23 PROCEDURE — 84484 ASSAY OF TROPONIN QUANT: CPT | Performed by: PHYSICIAN ASSISTANT

## 2023-12-23 PROCEDURE — 71045 X-RAY EXAM CHEST 1 VIEW: CPT

## 2023-12-23 PROCEDURE — 71045 X-RAY EXAM CHEST 1 VIEW: CPT | Performed by: RADIOLOGY

## 2023-12-23 RX ORDER — DOCUSATE SODIUM 100 MG/1
100 CAPSULE, LIQUID FILLED ORAL 2 TIMES DAILY
Status: DISCONTINUED | OUTPATIENT
Start: 2023-12-23 | End: 2023-12-26 | Stop reason: HOSPADM

## 2023-12-23 RX ORDER — POTASSIUM CHLORIDE 20 MEQ/1
40 TABLET, EXTENDED RELEASE ORAL ONCE
Status: COMPLETED | OUTPATIENT
Start: 2023-12-23 | End: 2023-12-23

## 2023-12-23 RX ORDER — HYDRALAZINE HYDROCHLORIDE 20 MG/ML
10 INJECTION INTRAMUSCULAR; INTRAVENOUS ONCE
Status: COMPLETED | OUTPATIENT
Start: 2023-12-23 | End: 2023-12-23

## 2023-12-23 RX ORDER — AMLODIPINE BESYLATE 5 MG/1
5 TABLET ORAL DAILY
Status: DISCONTINUED | OUTPATIENT
Start: 2023-12-23 | End: 2023-12-26 | Stop reason: HOSPADM

## 2023-12-23 RX ORDER — ALBUTEROL SULFATE 0.83 MG/ML
2.5 SOLUTION RESPIRATORY (INHALATION) EVERY 6 HOURS
Status: DISCONTINUED | OUTPATIENT
Start: 2023-12-23 | End: 2023-12-23

## 2023-12-23 RX ORDER — ALBUTEROL SULFATE 0.83 MG/ML
2.5 SOLUTION RESPIRATORY (INHALATION) EVERY 2 HOUR PRN
Status: DISCONTINUED | OUTPATIENT
Start: 2023-12-23 | End: 2023-12-26 | Stop reason: HOSPADM

## 2023-12-23 RX ADMIN — PANTOPRAZOLE SODIUM 40 MG: 40 INJECTION, POWDER, FOR SOLUTION INTRAVENOUS at 06:17

## 2023-12-23 RX ADMIN — ISOSORBIDE MONONITRATE 60 MG: 30 TABLET, EXTENDED RELEASE ORAL at 08:44

## 2023-12-23 RX ADMIN — APIXABAN 2.5 MG: 2.5 TABLET, FILM COATED ORAL at 20:07

## 2023-12-23 RX ADMIN — POTASSIUM CHLORIDE 40 MEQ: 1500 TABLET, EXTENDED RELEASE ORAL at 20:07

## 2023-12-23 RX ADMIN — APIXABAN 2.5 MG: 2.5 TABLET, FILM COATED ORAL at 08:44

## 2023-12-23 RX ADMIN — INSULIN LISPRO 2 UNITS: 100 INJECTION, SOLUTION INTRAVENOUS; SUBCUTANEOUS at 12:32

## 2023-12-23 RX ADMIN — ATORVASTATIN CALCIUM 80 MG: 80 TABLET, FILM COATED ORAL at 20:07

## 2023-12-23 RX ADMIN — SODIUM CHLORIDE 500 ML: 9 INJECTION, SOLUTION INTRAVENOUS at 16:15

## 2023-12-23 RX ADMIN — AMLODIPINE BESYLATE 5 MG: 5 TABLET ORAL at 08:43

## 2023-12-23 RX ADMIN — TIMOLOL MALEATE 1 DROP: 5 SOLUTION/ DROPS OPHTHALMIC at 21:21

## 2023-12-23 RX ADMIN — CLOPIDOGREL 75 MG: 75 TABLET ORAL at 08:43

## 2023-12-23 RX ADMIN — DOCUSATE SODIUM 100 MG: 100 CAPSULE, LIQUID FILLED ORAL at 21:21

## 2023-12-23 RX ADMIN — ACETAMINOPHEN 650 MG: 325 TABLET ORAL at 23:39

## 2023-12-23 RX ADMIN — INSULIN LISPRO 2 UNITS: 100 INJECTION, SOLUTION INTRAVENOUS; SUBCUTANEOUS at 17:04

## 2023-12-23 RX ADMIN — SODIUM CHLORIDE 250 ML: 9 INJECTION, SOLUTION INTRAVENOUS at 10:20

## 2023-12-23 RX ADMIN — OSELTAMIVIR PHOSPHATE 30 MG: 30 CAPSULE ORAL at 08:44

## 2023-12-23 RX ADMIN — METOPROLOL SUCCINATE 12.5 MG: 25 TABLET, EXTENDED RELEASE ORAL at 08:43

## 2023-12-23 RX ADMIN — DOCUSATE SODIUM 100 MG: 100 CAPSULE, LIQUID FILLED ORAL at 09:00

## 2023-12-23 RX ADMIN — AMIODARONE HYDROCHLORIDE 100 MG: 200 TABLET ORAL at 08:44

## 2023-12-23 RX ADMIN — FUROSEMIDE 20 MG: 20 TABLET ORAL at 08:44

## 2023-12-23 RX ADMIN — ACETAMINOPHEN 650 MG: 325 TABLET ORAL at 08:44

## 2023-12-23 RX ADMIN — HYDRALAZINE HYDROCHLORIDE 10 MG: 20 INJECTION INTRAMUSCULAR; INTRAVENOUS at 06:16

## 2023-12-23 ASSESSMENT — COGNITIVE AND FUNCTIONAL STATUS - GENERAL
WALKING IN HOSPITAL ROOM: A LOT
CLIMB 3 TO 5 STEPS WITH RAILING: TOTAL
DAILY ACTIVITIY SCORE: 20
MOBILITY SCORE: 11
TOILETING: A LITTLE
DRESSING REGULAR LOWER BODY CLOTHING: A LITTLE
MOVING TO AND FROM BED TO CHAIR: A LITTLE
TURNING FROM BACK TO SIDE WHILE IN FLAT BAD: A LOT
HELP NEEDED FOR BATHING: A LOT
STANDING UP FROM CHAIR USING ARMS: A LITTLE
DAILY ACTIVITIY SCORE: 16
HELP NEEDED FOR BATHING: A LITTLE
DRESSING REGULAR LOWER BODY CLOTHING: A LOT
CLIMB 3 TO 5 STEPS WITH RAILING: A LITTLE
TOILETING: A LOT
PERSONAL GROOMING: A LITTLE
DRESSING REGULAR UPPER BODY CLOTHING: A LITTLE
MOBILITY SCORE: 20
STANDING UP FROM CHAIR USING ARMS: A LOT
WALKING IN HOSPITAL ROOM: A LITTLE
MOVING FROM LYING ON BACK TO SITTING ON SIDE OF FLAT BED WITH BEDRAILS: A LOT
DRESSING REGULAR UPPER BODY CLOTHING: A LITTLE
MOVING TO AND FROM BED TO CHAIR: A LOT

## 2023-12-23 ASSESSMENT — PAIN SCALES - GENERAL
PAINLEVEL_OUTOF10: 0 - NO PAIN

## 2023-12-23 ASSESSMENT — PAIN - FUNCTIONAL ASSESSMENT
PAIN_FUNCTIONAL_ASSESSMENT: 0-10

## 2023-12-23 NOTE — PROGRESS NOTES
Shona Montgomery is a 89 y.o. female on day 0 of admission presenting with Influenza A.    Subjective   Patient sitting up in bed, son at bedside. Had some sob overnight and was started on 2L O2 via NC. Reports continued productive cough. Denies chest pain or chest tightness. Denies nausea or vomiting.       Objective     Physical Exam  Constitutional:       General: She is not in acute distress.     Appearance: She is ill-appearing. She is not toxic-appearing.      Comments: Dry lips noted   HENT:      Head: Normocephalic and atraumatic.      Right Ear: External ear normal.      Left Ear: External ear normal.      Nose: Nose normal. No congestion.      Mouth/Throat:      Mouth: Mucous membranes are moist.      Pharynx: Oropharynx is clear.   Eyes:      General:         Right eye: No discharge.         Left eye: No discharge.      Conjunctiva/sclera: Conjunctivae normal.      Pupils: Pupils are equal, round, and reactive to light.   Cardiovascular:      Rate and Rhythm: Normal rate and regular rhythm.      Heart sounds: No murmur heard.     No gallop.   Pulmonary:      Effort: Pulmonary effort is normal.      Breath sounds: Rhonchi present. No wheezing or rales.   Abdominal:      General: Abdomen is flat. Bowel sounds are normal.      Palpations: Abdomen is soft.      Tenderness: There is no abdominal tenderness. There is no guarding or rebound.   Musculoskeletal:         General: No swelling or tenderness. Normal range of motion.      Right lower leg: No edema.      Left lower leg: No edema.   Skin:     General: Skin is warm and dry.      Findings: No erythema or rash.   Neurological:      General: No focal deficit present.      Mental Status: She is alert.      Cranial Nerves: No cranial nerve deficit.      Motor: No weakness.   Psychiatric:         Mood and Affect: Mood normal.         Thought Content: Thought content normal.         Last Recorded Vitals  Blood pressure 154/52, pulse 83, temperature 37.9 °C (100.2  "°F), temperature source Temporal, resp. rate 16, height 1.6 m (5' 3\"), weight 50.8 kg (112 lb), SpO2 96 %.  Intake/Output last 3 Shifts:  I/O last 3 completed shifts:  In: - (0 mL/kg)   Out: 800 (15.7 mL/kg) [Urine:800 (0.4 mL/kg/hr)]  Weight: 50.8 kg     Relevant Results              Scheduled medications  amiodarone, 100 mg, oral, Daily  amLODIPine, 5 mg, oral, Daily  apixaban, 2.5 mg, oral, BID  atorvastatin, 80 mg, oral, Nightly  clopidogrel, 75 mg, oral, Daily  docusate sodium, 100 mg, oral, Daily  furosemide, 20 mg, oral, Daily  insulin lispro, 0-5 Units, subcutaneous, TID with meals  isosorbide mononitrate ER, 60 mg, oral, Daily  [Held by provider] losartan, 50 mg, oral, BID  melatonin, 3 mg, oral, Daily  metoprolol succinate XL, 12.5 mg, oral, Daily  oseltamivir, 30 mg, oral, Daily  pantoprazole, 40 mg, oral, Daily before breakfast   Or  pantoprazole, 40 mg, intravenous, Daily before breakfast  polyethylene glycol, 17 g, oral, Daily  sodium chloride, 250 mL, intravenous, Once  timolol, 1 drop, Left Eye, BID      Continuous medications     PRN medications  PRN medications: acetaminophen **OR** acetaminophen **OR** acetaminophen, albuterol, benzonatate, dextrose 10 % in water (D10W), dextrose, glucagon, guaiFENesin, nitroglycerin, ondansetron ODT **OR** ondansetron    Results for orders placed or performed during the hospital encounter of 12/22/23 (from the past 24 hour(s))   ECG 12 lead   Result Value Ref Range    Ventricular Rate 69 BPM    Atrial Rate 69 BPM    UT Interval 144 ms    QRS Duration 100 ms    QT Interval 392 ms    QTC Calculation(Bazett) 420 ms    P Axis 75 degrees    R Axis 86 degrees    T Axis 201 degrees    QRS Count 11 beats    Q Onset 220 ms    P Onset 148 ms    P Offset 208 ms    T Offset 416 ms    QTC Fredericia 410 ms   CBC and Auto Differential   Result Value Ref Range    WBC 5.4 4.4 - 11.3 x10*3/uL    nRBC 0.0 0.0 - 0.0 /100 WBCs    RBC 3.48 (L) 4.00 - 5.20 x10*6/uL    Hemoglobin 10.6 " (L) 12.0 - 16.0 g/dL    Hematocrit 33.1 (L) 36.0 - 46.0 %    MCV 95 80 - 100 fL    MCH 30.5 26.0 - 34.0 pg    MCHC 32.0 32.0 - 36.0 g/dL    RDW 13.7 11.5 - 14.5 %    Platelets 197 150 - 450 x10*3/uL    Neutrophils % 73.3 40.0 - 80.0 %    Immature Granulocytes %, Automated 0.2 0.0 - 0.9 %    Lymphocytes % 15.3 13.0 - 44.0 %    Monocytes % 10.4 2.0 - 10.0 %    Eosinophils % 0.4 0.0 - 6.0 %    Basophils % 0.4 0.0 - 2.0 %    Neutrophils Absolute 3.94 1.60 - 5.50 x10*3/uL    Immature Granulocytes Absolute, Automated 0.01 0.00 - 0.50 x10*3/uL    Lymphocytes Absolute 0.82 0.80 - 3.00 x10*3/uL    Monocytes Absolute 0.56 0.05 - 0.80 x10*3/uL    Eosinophils Absolute 0.02 0.00 - 0.40 x10*3/uL    Basophils Absolute 0.02 0.00 - 0.10 x10*3/uL   Comprehensive metabolic panel   Result Value Ref Range    Glucose 154 (H) 74 - 99 mg/dL    Sodium 136 136 - 145 mmol/L    Potassium 4.2 3.5 - 5.3 mmol/L    Chloride 103 98 - 107 mmol/L    Bicarbonate 26 21 - 32 mmol/L    Anion Gap 11 10 - 20 mmol/L    Urea Nitrogen 38 (H) 6 - 23 mg/dL    Creatinine 1.56 (H) 0.50 - 1.05 mg/dL    eGFR 31 (L) >60 mL/min/1.73m*2    Calcium 9.2 8.6 - 10.3 mg/dL    Albumin 3.7 3.4 - 5.0 g/dL    Alkaline Phosphatase 89 33 - 136 U/L    Total Protein 6.3 (L) 6.4 - 8.2 g/dL    AST 19 9 - 39 U/L    Bilirubin, Total 0.7 0.0 - 1.2 mg/dL    ALT 17 7 - 45 U/L   Magnesium   Result Value Ref Range    Magnesium 2.10 1.60 - 2.40 mg/dL   Sars-CoV-2 and Influenza A/B PCR   Result Value Ref Range    Flu A Result Detected (A) Not Detected    Flu B Result Not Detected Not Detected    Coronavirus 2019, PCR Not Detected Not Detected   B-Type Natriuretic Peptide   Result Value Ref Range     (H) 0 - 99 pg/mL   RSV PCR   Result Value Ref Range    RSV PCR Not Detected Not Detected   Troponin I, High Sensitivity, Initial   Result Value Ref Range    Troponin I, High Sensitivity 39 (H) 0 - 13 ng/L   Troponin I, High Sensitivity, Initial   Result Value Ref Range    Troponin I, High  Sensitivity 37 (H) 0 - 13 ng/L   Troponin, High Sensitivity, 1 Hour   Result Value Ref Range    Troponin I, High Sensitivity 40 (H) 0 - 13 ng/L   POCT GLUCOSE   Result Value Ref Range    POCT Glucose 132 (H) 74 - 99 mg/dL   CBC   Result Value Ref Range    WBC 6.3 4.4 - 11.3 x10*3/uL    nRBC 0.0 0.0 - 0.0 /100 WBCs    RBC 3.79 (L) 4.00 - 5.20 x10*6/uL    Hemoglobin 11.6 (L) 12.0 - 16.0 g/dL    Hematocrit 35.0 (L) 36.0 - 46.0 %    MCV 92 80 - 100 fL    MCH 30.6 26.0 - 34.0 pg    MCHC 33.1 32.0 - 36.0 g/dL    RDW 13.6 11.5 - 14.5 %    Platelets 193 150 - 450 x10*3/uL   Basic metabolic panel   Result Value Ref Range    Glucose 132 (H) 74 - 99 mg/dL    Sodium 134 (L) 136 - 145 mmol/L    Potassium 3.5 3.5 - 5.3 mmol/L    Chloride 99 98 - 107 mmol/L    Bicarbonate 22 21 - 32 mmol/L    Anion Gap 17 10 - 20 mmol/L    Urea Nitrogen 30 (H) 6 - 23 mg/dL    Creatinine 1.42 (H) 0.50 - 1.05 mg/dL    eGFR 34 (L) >60 mL/min/1.73m*2    Calcium 9.5 8.6 - 10.3 mg/dL   Troponin I, High Sensitivity   Result Value Ref Range    Troponin I, High Sensitivity 54 (HH) 0 - 13 ng/L   POCT GLUCOSE   Result Value Ref Range    POCT Glucose 153 (H) 74 - 99 mg/dL     ECG 12 lead    Result Date: 12/22/2023  Normal sinus rhythm ST & T wave abnormality, consider lateral ischemia Abnormal ECG When compared with ECG of 18-DEC-2023 14:43, Questionable change in QRS axis ST now depressed in Inferior leads Non-specific change in ST segment in Anterior leads Nonspecific T wave abnormality now evident in Inferior leads    XR chest 1 view    Result Date: 12/22/2023  STUDY: Chest Radiograph;  12/22/2023 11:10 AM. INDICATION: General weakness. COMPARISON: 04/05/2022 XR Chest 1 View, 04/02/2021 XR Chest 1 View. ACCESSION NUMBER(S): [id number] ORDERING CLINICIAN: RENO KRISHNAN TECHNIQUE:  Frontal chest was obtained at 1209 hours. FINDINGS: CARDIOMEDIASTINAL SILHOUETTE: The patient status post median sternotomy.  Cardiomediastinal silhouette is normal in size and  "configuration.  LUNGS: Lungs are clear.  ABDOMEN: No remarkable upper abdominal findings.  BONES: No acute osseous changes.    No evidence consolidating infiltrate or effusion. Signed by Bandar Car MD    ECG 12 lead (Clinic Performed)    Result Date: 12/18/2023  Sinus bradycardia Possible Left atrial enlargement T wave abnormality, consider lateral ischemia Abnormal ECG Confirmed by Mohsen Mendiola ([id number]) on 12/18/2023 5:37:58 PM                  Assessment/Plan   Principal Problem:    Influenza A  Shona Montgomery is a 89 y.o. female PMHx HTN, HLD, CKD3, A fib on eliquis, CAD s/p CABG, Hx NSTEMI, T2DM presenting with weakness and found to be positive for influenza.     ED workup: BP (!) 136/93   Pulse 84   Temp 37.2 °C (99 °F)   Resp 16   Ht 1.6 m (5' 3\")   Wt 50.8 kg (112 lb)   LMP  (LMP Unknown)   SpO2 97%   BMI 19.84 kg/m²   EKG NSR HR 69 nonspecific ST segment changes noted V1 V2; CXR negative for infiltrate or effusion; trop 39 CBC no leukocytosis, hgb 10.6 (at baseline), no thrombocytopenia; CMP Cr 1.56 (baseline 1.2-1.4) BUN 38, LFTs wnl, electrolytes wnl;  (was 378 last month); troponin 39 (was 32 last month); influenza A +     Influenza A  New oxygen requirement  Weakness  Dehydration  -Patient does appear somewhat dehydrated on exam, BUN/Cr elevated iso of decreased oral intake. Will give 250 mL NS bolus and pay close attention to fluid stating iso of HFrEF.  -CXR negative for infiltrate effusion  -repeat CXR pending given new O2 requirement     -consider steroid vs abx pending read  -tamiflu renal dosing   -mucinex, tessalon  -albuterol nebulizer, scheduled  -IS  -diabetic diet     A.fib on eliquis  HFrEF  HTN  CAD s/p CABG  -no chest pain, sob, palpitations  -EKG NSR HR 69 nonspecific ST segment changes noted V1V2  -continue amiodarone, eliquis  -trop 39, repeat pending     -was 32 last month  -     -was 378 last month  -telemetry  -continue furosemide     -currently takes MWF, " will increase to daily considering elevated BNP  -reviewed ECHO 04/03/2021    -LVEF 45-50%    -inferolateral wall abnormality    -diastolic dysfunction   -cardiology consult appreciated     T2DM  -on insulin degludec 5 U HS  -will hold iso of decreased PO  -accucheck, hypoglycemia order set  -SS lispro     CKD 3  -close to baseline   -avoid nephrotoxic meds  -trend BMP     GI ppx:  -PPI, bowel regimen     DVT Ppx;  -cont bruna MITCHELL spent 40 minutes in the professional and overall care of this patient.      NICOLE StallnigsC

## 2023-12-23 NOTE — SIGNIFICANT EVENT
"Patient hypotensive to 104/45, likely 2/2 to dehydration. Will hold lasix and antihypertensives, continue Imdur. IV  mL ordered. Will have to monitor fluid status iso of HF. Recheck CBC BMP Mg. CXR and procal pending. Patient oxygenating appropriately 94% on RA.    PE  Cardiac: RRR  Lungs Rhonchi noted upper left upper field cleared with cough, good air movement throughout  Legs: without edema    BP 99/50 (BP Location: Left arm, Patient Position: Lying)   Pulse 60   Temp 37.3 °C (99.1 °F) (Temporal)   Resp 16   Ht 1.6 m (5' 3\")   Wt 50.8 kg (112 lb)   BMI 19.84 kg/m²     "

## 2023-12-23 NOTE — CONSULTS
Inpatient consult to Cardiology  Consult performed by: MAGALI Moeller-CNP  Consult ordered by: Ramone Car PA-C  Reason for consult: Elevated troponin, BNP        History Of Present Illness:      Shona Montgomery is a 89 y.o. female with past medical history significant for hypertension, hyperlipidemia, CKD stage III,  p A-fib on Eliquis, NICM (EF 35-40%), MR mild to mod, CAD status post CABG  and prior non-ST elevation myocardial infarction (repeat cath 7 years ago showed a patent sequential LIMA with an occluded SVG-RCA) , chronic systolic heart failure, type 2 diabetes presenting to emergency room on December 22 complaining of weakness, she was found to be positive for influenza.  Lab work markable for elevated troponin felt relatively flat trend as well as elevated BNP which is the reason for cardiology consultation.     (previously reported at 378), troponin 39/37/40/54.  Creatinine 1.56 (baseline 1.2-1.4).  She was treated with IV fluids.    Patient reports 24 to 48 hours of awfully feeling tired, productive cough, fever, chills and poor appetite.  She had a hard time even getting dressed in the morning.  She went to urgent care, reportedly some lab work done and was advised to go to emergency room.  She denies having had any shortness of breath at home however last night believes she had a hard time breathing, she was placed on oxygen.  She denies short of breath at present.  No chest pain, no loss of consciousness just feeling tired.  She has been compliant with all her routine cardiovascular medication has been following up with Dr. Mendiola on a regular basis.  Recent office visit she was believed to be stable from CV perspective.        Past Cardiology Tests (Last 3 Years):  EKG:  Results for orders placed during the hospital encounter of 12/22/23    ECG 12 lead (Preliminary)  This result has not been signed. Information might be incomplete.    Narrative  Normal sinus rhythm  ST & T  "wave abnormality, consider lateral ischemia  Abnormal ECG  When compared with ECG of 18-DEC-2023 14:43,  Questionable change in QRS axis  ST now depressed in Inferior leads  Non-specific change in ST segment in Anterior leads  Nonspecific T wave abnormality now evident in Inferior leads      Results for orders placed in visit on 12/18/23    ECG 12 lead (Clinic Performed)    Narrative  Sinus bradycardia  Possible Left atrial enlargement  T wave abnormality, consider lateral ischemia  Abnormal ECG  Confirmed by Mohsen Mendiola ([id number]) on 12/18/2023 5:37:58 PM      Results for orders placed in visit on 10/24/23    ECG 12 lead (Clinic Performed)    Narrative  Sinus bradycardia  Incomplete left bundle branch block  Nonspecific T wave abnormality  Abnormal ECG  Confirmed by Mohsen Mendiola ([id number]) on 10/24/2023 7:17:54 PM    Echo:  4/2022  1. The left ventricular systolic function is moderately decreased with a 35-40% estimated ejection fraction.  2. Spectral Doppler shows an impaired relaxation pattern of left ventricular diastolic filling.  3. The left atrium is severely dilated.  4. Moderate mitral valve regurgitation.  5. Mildly elevated RVSP.    Ejection Fractions:  No results found for: \"EF\"  Cath:  No results found for this or any previous visit.    Stress Test:  No results found for this or any previous visit.    Cardiac Imaging:  No results found for this or any previous visit.      Past Medical History:    CAD status post CABG  Ischemic cardiomyopathy with EF 35 to 40%  Chronic systolic HF  Hypertension  Dyslipidemia  Atrial fibrillation on Eliquis  Moderate mitral regurgitation  CKD stage III  Type 2 diabetes    Past Surgical History:  She has a past surgical history that includes Coronary artery bypass graft (08/08/2016) and Total hip arthroplasty (08/28/2014).      Social History:  She reports that she has never smoked. She has never used smokeless tobacco. No history on file for alcohol use and drug " "use.    Family History:  Family History   Problem Relation Name Age of Onset    No Known Problems Mother      No Known Problems Father          Allergies:  Tramadol    ROS:  10 point review of systems including (Constitutional, Eyes, ENMT, Respiratory, Cardiac, Gastrointestinal, Neurological, Psychiatric, and Hematologic) was performed and is otherwise negative.    Objective Data:  Last Recorded Vitals:  Vitals:    23 1400 23 0040 23 0540 23 0655   BP: (!) 136/93 159/66 167/69 158/69   Pulse: 84 72 88 83   Resp:  18  24   Temp: 37.2 °C (99 °F) 37.5 °C (99.5 °F) 37.2 °C (99 °F)    TempSrc:  Temporal Temporal    SpO2: 97% 96% 94% 93%   Weight:       Height:         Medical Gas Therapy: Supplemental oxygen  O2 Delivery Method: Nasal cannula  Weight  Av.8 kg (112 lb)  Min: 50.8 kg (112 lb)  Max: 50.8 kg (112 lb)      LABS:  CMP:  Results from last 7 days   Lab Units 23  0357 23  1222   SODIUM mmol/L 134* 136   POTASSIUM mmol/L 3.5 4.2   CHLORIDE mmol/L 99 103   CO2 mmol/L 22 26   ANION GAP mmol/L 17 11   BUN mg/dL 30* 38*   CREATININE mg/dL 1.42* 1.56*   EGFR mL/min/1.73m*2 34* 31*   MAGNESIUM mg/dL  --  2.10   ALBUMIN g/dL  --  3.7   ALT U/L  --  17   AST U/L  --  19   BILIRUBIN TOTAL mg/dL  --  0.7     CBC:  Results from last 7 days   Lab Units 23  0357 23  1222   WBC AUTO x10*3/uL 6.3 5.4   HEMOGLOBIN g/dL 11.6* 10.6*   HEMATOCRIT % 35.0* 33.1*   PLATELETS AUTO x10*3/uL 193 197   MCV fL 92 95     COAG:     ABO: No results found for: \"ABO\"  HEME/ENDO:     CARDIAC:   Results from last 7 days   Lab Units 23  0357 23  1542 23  1407 23  1222   TROPHS ng/L 54* 40* 37* 39*   BNP pg/mL  --   --   --  649*             Last I/O:    Intake/Output Summary (Last 24 hours) at 2023  Last data filed at 2023 0547  Gross per 24 hour   Intake --   Output 800 ml   Net -800 ml     Net IO Since Admission: -800 mL [23]      Imaging " Results:  ECG 12 lead    Result Date: 12/22/2023  Normal sinus rhythm ST & T wave abnormality, consider lateral ischemia Abnormal ECG When compared with ECG of 18-DEC-2023 14:43, Questionable change in QRS axis ST now depressed in Inferior leads Non-specific change in ST segment in Anterior leads Nonspecific T wave abnormality now evident in Inferior leads    XR chest 1 view    Result Date: 12/22/2023  STUDY: Chest Radiograph;  12/22/2023 11:10 AM. INDICATION: General weakness. COMPARISON: 04/05/2022 XR Chest 1 View, 04/02/2021 XR Chest 1 View. ACCESSION NUMBER(S): [id number] ORDERING CLINICIAN: RENO KRISHNAN TECHNIQUE:  Frontal chest was obtained at 1209 hours. FINDINGS: CARDIOMEDIASTINAL SILHOUETTE: The patient status post median sternotomy.  Cardiomediastinal silhouette is normal in size and configuration.  LUNGS: Lungs are clear.  ABDOMEN: No remarkable upper abdominal findings.  BONES: No acute osseous changes.    No evidence consolidating infiltrate or effusion. Signed by Bandar Car MD      Inpatient Medications:  Scheduled medications   Medication Dose Route Frequency    amiodarone  100 mg oral Daily    amLODIPine  2.5 mg oral Daily    apixaban  2.5 mg oral BID    atorvastatin  80 mg oral Nightly    clopidogrel  75 mg oral Daily    docusate sodium  100 mg oral Daily    furosemide  20 mg oral Daily    insulin lispro  0-5 Units subcutaneous TID with meals    isosorbide mononitrate ER  60 mg oral Daily    [Held by provider] losartan  50 mg oral BID    melatonin  3 mg oral Daily    metoprolol succinate XL  12.5 mg oral Daily    oseltamivir  30 mg oral Daily    pantoprazole  40 mg oral Daily before breakfast    Or    pantoprazole  40 mg intravenous Daily before breakfast    polyethylene glycol  17 g oral Daily    timolol  1 drop Left Eye BID     PRN medications   Medication    acetaminophen    Or    acetaminophen    Or    acetaminophen    albuterol    benzonatate    dextrose 10 % in water (D10W)    dextrose     glucagon    guaiFENesin    nitroglycerin    ondansetron ODT    Or    ondansetron     Continuous Medications   Medication Dose Last Rate       Outpatient Medications:  Current Outpatient Medications   Medication Instructions    amiodarone (PACERONE) 100 mg, oral, Daily    amLODIPine (Norvasc) 2.5 mg tablet 1 tablet, oral, Daily    apixaban (Eliquis) 2.5 mg tablet 1 tablet, oral, 2 times daily    atorvastatin (Lipitor) 80 mg tablet 1 tablet, oral, Nightly    clopidogrel (Plavix) 75 mg tablet 1 tablet, oral, Daily    cyanocobalamin (VITAMIN B-12) 500 mcg, oral, Daily    docusate sodium (COLACE) 100 mg, oral, Daily    ferrous sulfate 65 mg, oral, Every other day, Do not crush, chew, or split.    furosemide (LASIX) 20 mg, oral, Daily, On Mondays Wednesday and Friday    insulin degludec (TRESIBA FLEXTOUCH U-100) 5 Units, subcutaneous, Nightly    isosorbide mononitrate ER (Imdur) 60 mg 24 hr tablet 1.5 tablets, oral, Daily    lancets (Accu-Chek Fastclix Lancet Drum) misc 1 Lancet, miscellaneous, 3 times daily    losartan (Cozaar) 100 mg tablet 0.5 tablets, oral, 2 times daily    metoprolol succinate XL (Toprol-XL) 25 mg 24 hr tablet 1/2 tablet by mouth daily    nitroglycerin (NITROSTAT) 0.4 mg, sublingual, Every 5 min PRN    OneTouch Ultra Test strip 3 times daily    timolol (Timoptic) 0.5 % ophthalmic solution 1 drop, Left Eye, 2 times daily       Physical Exam:  General:  Patient is awake, alert, and oriented.  Patient is in no acute distress.  HEENT:  Pupils equal and reactive.  Normocephalic.  Moist mucosa.    Neck:  No thyromegaly.  Normal Jugular Venous Pressure.  Cardiovascular:  Regular rate and rhythm.  Normal S1 and S2.  Pulmonary:  Clear to auscultation bilaterally.  Abdomen:  Soft. Non-tender.   Non-distended.  Positive bowel sounds.  Lower Extremities:  2+ pedal pulses. No LE edema.  Neurologic:  Cranial nerves intact.  No focal deficit.   Skin: Skin warm and dry, normal skin turgor.   Psychiatric: Normal  affect.     Assessment/Plan     Shona Montgomery is a 89 y.o. female with past medical history significant for hypertension, hyperlipidemia, CKD stage III,  p A-fib on Eliquis, NICM (EF 35-40%), MR mild to mod, CAD status post CABG  and prior non-ST elevation myocardial infarction (repeat cath 7 years ago showed a patent sequential LIMA with an occluded SVG-RCA) , chronic systolic heart failure, type 2 diabetes presenting to emergency room on December 22 complaining of weakness, she was found to be positive for influenza.  Lab work markable for elevated troponin felt relatively flat trend as well as elevated BNP which is the reason for cardiology consultation.     (previously reported at 378), troponin 39/37/40/54.  Creatinine 1.56 (baseline 1.2-1.4).  She was treated with IV fluids.    I reviewed ECG.  Sinus rhythm nonspecific ST-T abnormalities existed on prior ECGs.  No acute ischemic changes.    I reviewed telemetry.  Patient currently is not on telemetry.  I reviewed chest x-ray radiology image and dictation.    1.  Elevated troponin with no prior history of troponin elevation, remains relatively flat and minimally elevated.  EKG negative for any acute ischemic changes and no cardiac complaints including chest pain.  Suspect elevated troponin secondary to chronic leak.  Non-MI troponin elevation.  Core measures not apply    2.  Chronic systolic heart failure appears euvolemic to dry on exam and no heart failure findings reported per chest x-ray appears totally clear.  Continue home diuretic regimen.      3.  History of paroxysmal atrial fibrillation currently in sinus rhythm continue anticoagulation with Eliquis  Continue home beta-blocker and chronically on amiodarone  Follows with Dr. Mendiola for cardiology care      4.  History of CAD status post CABGepeat cath 7 years ago showed a patent sequential LIMA with an occluded SVG-RCA    No chest pain  Continue chronic CV meds including a statin, Eliquis as  above      5.  Dyslipidemia  Continue statin      6.  Ischemic cardiomyopathy EF 35 to 40%  Continue Toprol, losartan  Consideration of Entresto as outpatient for her cardiologist discretion          Code Status:  Full Code            Lenka Robison, APRN-CNP

## 2023-12-24 LAB
ANION GAP SERPL CALC-SCNC: 14 MMOL/L (ref 10–20)
BUN SERPL-MCNC: 33 MG/DL (ref 6–23)
CALCIUM SERPL-MCNC: 8.7 MG/DL (ref 8.6–10.3)
CARDIAC TROPONIN I PNL SERPL HS: 73 NG/L (ref 0–13)
CHLORIDE SERPL-SCNC: 104 MMOL/L (ref 98–107)
CO2 SERPL-SCNC: 22 MMOL/L (ref 21–32)
CREAT SERPL-MCNC: 1.58 MG/DL (ref 0.5–1.05)
ERYTHROCYTE [DISTWIDTH] IN BLOOD BY AUTOMATED COUNT: 13.8 % (ref 11.5–14.5)
GFR SERPL CREATININE-BSD FRML MDRD: 30 ML/MIN/1.73M*2
GLUCOSE BLD MANUAL STRIP-MCNC: 127 MG/DL (ref 74–99)
GLUCOSE BLD MANUAL STRIP-MCNC: 193 MG/DL (ref 74–99)
GLUCOSE BLD MANUAL STRIP-MCNC: 206 MG/DL (ref 74–99)
GLUCOSE BLD MANUAL STRIP-MCNC: 257 MG/DL (ref 74–99)
GLUCOSE SERPL-MCNC: 149 MG/DL (ref 74–99)
HCT VFR BLD AUTO: 32.7 % (ref 36–46)
HGB BLD-MCNC: 10.9 G/DL (ref 12–16)
MCH RBC QN AUTO: 30.7 PG (ref 26–34)
MCHC RBC AUTO-ENTMCNC: 33.3 G/DL (ref 32–36)
MCV RBC AUTO: 92 FL (ref 80–100)
NRBC BLD-RTO: 0 /100 WBCS (ref 0–0)
PLATELET # BLD AUTO: 185 X10*3/UL (ref 150–450)
POTASSIUM SERPL-SCNC: 4.2 MMOL/L (ref 3.5–5.3)
PROCALCITONIN SERPL-MCNC: 0.4 NG/ML
RBC # BLD AUTO: 3.55 X10*6/UL (ref 4–5.2)
SODIUM SERPL-SCNC: 136 MMOL/L (ref 136–145)
WBC # BLD AUTO: 5.7 X10*3/UL (ref 4.4–11.3)

## 2023-12-24 PROCEDURE — 99233 SBSQ HOSP IP/OBS HIGH 50: CPT | Performed by: INTERNAL MEDICINE

## 2023-12-24 PROCEDURE — 82947 ASSAY GLUCOSE BLOOD QUANT: CPT

## 2023-12-24 PROCEDURE — 2500000001 HC RX 250 WO HCPCS SELF ADMINISTERED DRUGS (ALT 637 FOR MEDICARE OP): Performed by: PHYSICIAN ASSISTANT

## 2023-12-24 PROCEDURE — 9420000001 HC RT PATIENT EDUCATION 5 MIN

## 2023-12-24 PROCEDURE — 84484 ASSAY OF TROPONIN QUANT: CPT | Performed by: REGISTERED NURSE

## 2023-12-24 PROCEDURE — 80048 BASIC METABOLIC PNL TOTAL CA: CPT | Performed by: PHYSICIAN ASSISTANT

## 2023-12-24 PROCEDURE — 1200000002 HC GENERAL ROOM WITH TELEMETRY DAILY

## 2023-12-24 PROCEDURE — 2500000001 HC RX 250 WO HCPCS SELF ADMINISTERED DRUGS (ALT 637 FOR MEDICARE OP): Performed by: HOSPITALIST

## 2023-12-24 PROCEDURE — 2500000004 HC RX 250 GENERAL PHARMACY W/ HCPCS (ALT 636 FOR OP/ED): Performed by: PHYSICIAN ASSISTANT

## 2023-12-24 PROCEDURE — 85027 COMPLETE CBC AUTOMATED: CPT | Performed by: PHYSICIAN ASSISTANT

## 2023-12-24 PROCEDURE — 2500000002 HC RX 250 W HCPCS SELF ADMINISTERED DRUGS (ALT 637 FOR MEDICARE OP, ALT 636 FOR OP/ED): Performed by: PHYSICIAN ASSISTANT

## 2023-12-24 PROCEDURE — 36415 COLL VENOUS BLD VENIPUNCTURE: CPT | Performed by: PHYSICIAN ASSISTANT

## 2023-12-24 PROCEDURE — 36415 COLL VENOUS BLD VENIPUNCTURE: CPT | Performed by: REGISTERED NURSE

## 2023-12-24 PROCEDURE — 2500000004 HC RX 250 GENERAL PHARMACY W/ HCPCS (ALT 636 FOR OP/ED): Performed by: REGISTERED NURSE

## 2023-12-24 RX ORDER — GUAIFENESIN 600 MG/1
600 TABLET, EXTENDED RELEASE ORAL 2 TIMES DAILY
Status: DISCONTINUED | OUTPATIENT
Start: 2023-12-24 | End: 2023-12-26 | Stop reason: HOSPADM

## 2023-12-24 RX ORDER — GUAIFENESIN 100 MG/5ML
200 SOLUTION ORAL EVERY 4 HOURS PRN
Status: DISCONTINUED | OUTPATIENT
Start: 2023-12-24 | End: 2023-12-26 | Stop reason: HOSPADM

## 2023-12-24 RX ADMIN — DOCUSATE SODIUM 100 MG: 100 CAPSULE, LIQUID FILLED ORAL at 21:08

## 2023-12-24 RX ADMIN — APIXABAN 2.5 MG: 2.5 TABLET, FILM COATED ORAL at 10:26

## 2023-12-24 RX ADMIN — ATORVASTATIN CALCIUM 80 MG: 80 TABLET, FILM COATED ORAL at 21:08

## 2023-12-24 RX ADMIN — ISOSORBIDE MONONITRATE 60 MG: 30 TABLET, EXTENDED RELEASE ORAL at 10:26

## 2023-12-24 RX ADMIN — METOPROLOL SUCCINATE 12.5 MG: 25 TABLET, EXTENDED RELEASE ORAL at 21:07

## 2023-12-24 RX ADMIN — INSULIN LISPRO 3 UNITS: 100 INJECTION, SOLUTION INTRAVENOUS; SUBCUTANEOUS at 13:12

## 2023-12-24 RX ADMIN — GUAIFENESIN 600 MG: 600 TABLET, EXTENDED RELEASE ORAL at 02:41

## 2023-12-24 RX ADMIN — AMIODARONE HYDROCHLORIDE 100 MG: 200 TABLET ORAL at 10:27

## 2023-12-24 RX ADMIN — PANTOPRAZOLE SODIUM 40 MG: 40 TABLET, DELAYED RELEASE ORAL at 09:00

## 2023-12-24 RX ADMIN — APIXABAN 2.5 MG: 2.5 TABLET, FILM COATED ORAL at 21:08

## 2023-12-24 RX ADMIN — CLOPIDOGREL 75 MG: 75 TABLET ORAL at 10:28

## 2023-12-24 RX ADMIN — GUAIFENESIN 600 MG: 600 TABLET, EXTENDED RELEASE ORAL at 21:08

## 2023-12-24 RX ADMIN — GUAIFENESIN 600 MG: 600 TABLET, EXTENDED RELEASE ORAL at 10:26

## 2023-12-24 RX ADMIN — INSULIN LISPRO 1 UNITS: 100 INJECTION, SOLUTION INTRAVENOUS; SUBCUTANEOUS at 17:37

## 2023-12-24 RX ADMIN — POLYETHYLENE GLYCOL 3350 17 G: 17 POWDER, FOR SOLUTION ORAL at 10:26

## 2023-12-24 RX ADMIN — DOCUSATE SODIUM 100 MG: 100 CAPSULE, LIQUID FILLED ORAL at 10:28

## 2023-12-24 RX ADMIN — OSELTAMIVIR PHOSPHATE 30 MG: 30 CAPSULE ORAL at 10:25

## 2023-12-24 ASSESSMENT — COGNITIVE AND FUNCTIONAL STATUS - GENERAL
MOBILITY SCORE: 17
DRESSING REGULAR UPPER BODY CLOTHING: A LITTLE
HELP NEEDED FOR BATHING: A LOT
WALKING IN HOSPITAL ROOM: A LITTLE
DRESSING REGULAR LOWER BODY CLOTHING: A LITTLE
DAILY ACTIVITIY SCORE: 16
TURNING FROM BACK TO SIDE WHILE IN FLAT BAD: A LITTLE
DRESSING REGULAR LOWER BODY CLOTHING: A LOT
TOILETING: A LOT
STANDING UP FROM CHAIR USING ARMS: A LITTLE
MOVING TO AND FROM BED TO CHAIR: A LOT
TOILETING: A LOT
MOBILITY SCORE: 13
MOVING FROM LYING ON BACK TO SITTING ON SIDE OF FLAT BED WITH BEDRAILS: A LITTLE
WALKING IN HOSPITAL ROOM: A LOT
PERSONAL GROOMING: A LITTLE
CLIMB 3 TO 5 STEPS WITH RAILING: TOTAL
TURNING FROM BACK TO SIDE WHILE IN FLAT BAD: A LITTLE
DRESSING REGULAR UPPER BODY CLOTHING: A LITTLE
STANDING UP FROM CHAIR USING ARMS: A LOT
DAILY ACTIVITIY SCORE: 19
CLIMB 3 TO 5 STEPS WITH RAILING: A LOT
MOVING FROM LYING ON BACK TO SITTING ON SIDE OF FLAT BED WITH BEDRAILS: A LITTLE
MOVING TO AND FROM BED TO CHAIR: A LITTLE
HELP NEEDED FOR BATHING: A LITTLE

## 2023-12-24 ASSESSMENT — PAIN SCALES - GENERAL
PAINLEVEL_OUTOF10: 0 - NO PAIN

## 2023-12-24 ASSESSMENT — PAIN - FUNCTIONAL ASSESSMENT: PAIN_FUNCTIONAL_ASSESSMENT: 0-10

## 2023-12-24 NOTE — SIGNIFICANT EVENT
Called to bedside for rapid response in the setting of acute chest pain.     Patient with acute onset mid-sternal chest pain, unable to provide a number for severity of pain. At time of rapid response, chest pain had resolved without intervention. Patient hemodynamically stable, BP 130s/80s, SpO2 95% on 2L NC, HR 60s. EKG unchanged from previous. Patient does have a frequent, non-productive cough. Breath sounds course bilaterally, right worse than left. Patient denied SOB, CP, palpitations, lightheadedness, or dizziness.     #Angina   -Monitor on telemetry  -Stat troponin   -Cardiology saw patient, consider re-engaging     #Flu A  -Scheduled mucinex   -Robitussin PRN    Primary team notified      I have reviewed and evaluated the most recent data and results, personally examined the patient, and formulated the plan of care as presented above.   Pt had a critical illness which required high complexity decision making to assess, manipulate, and support vital systems to treat single or multiple vital organ system failure and/or prevent further life threatening deterioration of the patient’s condition.   Any separately billable procedures are not included in the time calculation.  30 minute(s) spent at bedside providing critical care services.

## 2023-12-24 NOTE — CARE PLAN
The patient's goals for the shift include      The clinical goals for the shift include Pt will remain hemodynamically stable by end of shift    Over the shift, the patient did not make progress toward the following goals.

## 2023-12-24 NOTE — PROGRESS NOTES
"Shona Montgomery is a 89 y.o. female on day 1 of admission presenting with Influenza A.    Subjective     Patient on 1 L of nasal cannula oxygen updated daughter at bedside, creatinine improving down to 1.5.  Patient states breathing is better she is coughing.  Appreciated cardiology input.       Objective     Physical Exam  Constitutional:       Appearance: Normal appearance.   HENT:      Head: Normocephalic.      Nose: Nose normal.   Cardiovascular:      Rate and Rhythm: Normal rate and regular rhythm.      Pulses: Normal pulses.      Heart sounds: Normal heart sounds.   Pulmonary:      Effort: Pulmonary effort is normal.      Breath sounds: Normal breath sounds.   Abdominal:      General: Abdomen is flat. Bowel sounds are normal.      Palpations: Abdomen is soft.   Skin:     Capillary Refill: Capillary refill takes less than 2 seconds.   Neurological:      General: No focal deficit present.      Mental Status: She is alert.         Last Recorded Vitals  Blood pressure 134/87, pulse 92, temperature 37 °C (98.6 °F), temperature source Axillary, resp. rate 18, height 1.6 m (5' 3\"), weight 50.8 kg (112 lb), SpO2 97 %.  Intake/Output last 3 Shifts:  I/O last 3 completed shifts:  In: 360 (7.1 mL/kg) [P.O.:360]  Out: 1100 (21.7 mL/kg) [Urine:1100 (0.6 mL/kg/hr)]  Weight: 50.8 kg     Relevant Results  Results for orders placed or performed during the hospital encounter of 12/22/23 (from the past 24 hour(s))   Procalcitonin   Result Value Ref Range    Procalcitonin 0.40 (H) <=0.07 ng/mL   POCT GLUCOSE   Result Value Ref Range    POCT Glucose 232 (H) 74 - 99 mg/dL   CBC   Result Value Ref Range    WBC 5.2 4.4 - 11.3 x10*3/uL    nRBC 0.0 0.0 - 0.0 /100 WBCs    RBC 3.32 (L) 4.00 - 5.20 x10*6/uL    Hemoglobin 10.3 (L) 12.0 - 16.0 g/dL    Hematocrit 32.4 (L) 36.0 - 46.0 %    MCV 98 80 - 100 fL    MCH 31.0 26.0 - 34.0 pg    MCHC 31.8 (L) 32.0 - 36.0 g/dL    RDW 13.7 11.5 - 14.5 %    Platelets 161 150 - 450 x10*3/uL   Basic " Metabolic Panel   Result Value Ref Range    Glucose 189 (H) 74 - 99 mg/dL    Sodium 134 (L) 136 - 145 mmol/L    Potassium 3.2 (L) 3.5 - 5.3 mmol/L    Chloride 104 98 - 107 mmol/L    Bicarbonate 20 (L) 21 - 32 mmol/L    Anion Gap 13 10 - 20 mmol/L    Urea Nitrogen 34 (H) 6 - 23 mg/dL    Creatinine 1.67 (H) 0.50 - 1.05 mg/dL    eGFR 28 (L) >60 mL/min/1.73m*2    Calcium 8.2 (L) 8.6 - 10.3 mg/dL   Magnesium   Result Value Ref Range    Magnesium 1.70 1.60 - 2.40 mg/dL   POCT GLUCOSE   Result Value Ref Range    POCT Glucose 206 (H) 74 - 99 mg/dL   Troponin I, High Sensitivity   Result Value Ref Range    Troponin I, High Sensitivity 73 (HH) 0 - 13 ng/L   CBC   Result Value Ref Range    WBC 5.7 4.4 - 11.3 x10*3/uL    nRBC 0.0 0.0 - 0.0 /100 WBCs    RBC 3.55 (L) 4.00 - 5.20 x10*6/uL    Hemoglobin 10.9 (L) 12.0 - 16.0 g/dL    Hematocrit 32.7 (L) 36.0 - 46.0 %    MCV 92 80 - 100 fL    MCH 30.7 26.0 - 34.0 pg    MCHC 33.3 32.0 - 36.0 g/dL    RDW 13.8 11.5 - 14.5 %    Platelets 185 150 - 450 x10*3/uL   Basic Metabolic Panel   Result Value Ref Range    Glucose 149 (H) 74 - 99 mg/dL    Sodium 136 136 - 145 mmol/L    Potassium 4.2 3.5 - 5.3 mmol/L    Chloride 104 98 - 107 mmol/L    Bicarbonate 22 21 - 32 mmol/L    Anion Gap 14 10 - 20 mmol/L    Urea Nitrogen 33 (H) 6 - 23 mg/dL    Creatinine 1.58 (H) 0.50 - 1.05 mg/dL    eGFR 30 (L) >60 mL/min/1.73m*2    Calcium 8.7 8.6 - 10.3 mg/dL   POCT GLUCOSE   Result Value Ref Range    POCT Glucose 127 (H) 74 - 99 mg/dL       Imaging Results  Cxr:IMPRESSION:  No evidence consolidating infiltrate or effusion.      Medications:  amiodarone, 100 mg, oral, Daily  [Held by provider] amLODIPine, 5 mg, oral, Daily  apixaban, 2.5 mg, oral, BID  atorvastatin, 80 mg, oral, Nightly  clopidogrel, 75 mg, oral, Daily  docusate sodium, 100 mg, oral, BID  [Held by provider] furosemide, 20 mg, oral, Daily  guaiFENesin, 600 mg, oral, BID  insulin lispro, 0-5 Units, subcutaneous, TID with meals  isosorbide  mononitrate ER, 60 mg, oral, Daily  [Held by provider] losartan, 50 mg, oral, BID  melatonin, 3 mg, oral, Daily  metoprolol succinate XL, 12.5 mg, oral, Daily  oseltamivir, 30 mg, oral, Daily  pantoprazole, 40 mg, oral, Daily before breakfast   Or  pantoprazole, 40 mg, intravenous, Daily before breakfast  polyethylene glycol, 17 g, oral, Daily  timolol, 1 drop, Left Eye, BID       PRN medications: acetaminophen **OR** acetaminophen **OR** acetaminophen, albuterol, benzonatate, dextrose 10 % in water (D10W), dextrose, glucagon, guaiFENesin, nitroglycerin, ondansetron ODT **OR** ondansetron     Assessment/Plan   1.  Influenza A with acute hypoxic respiratory failure  - down to 1 L nasal cannula oxygen, complete dose of Tamiflu.    2.  CKD 3  - patient's baseline is between 1.4 and 1.6, currently at baseline    3. non-MI troponin elevation    4. paroxysmal A-fib  - on Eliquis    5. nonischemic cardiomyopathy with history of coronary disease status post CABG  - continue cardioprotective measures       updated daughter at bedside    DVT Prophylaxis:  Nacho Castillo MD  [facility]

## 2023-12-25 LAB
ANION GAP SERPL CALC-SCNC: 12 MMOL/L (ref 10–20)
BUN SERPL-MCNC: 29 MG/DL (ref 6–23)
CALCIUM SERPL-MCNC: 9.1 MG/DL (ref 8.6–10.3)
CHLORIDE SERPL-SCNC: 102 MMOL/L (ref 98–107)
CO2 SERPL-SCNC: 24 MMOL/L (ref 21–32)
CREAT SERPL-MCNC: 1.48 MG/DL (ref 0.5–1.05)
ERYTHROCYTE [DISTWIDTH] IN BLOOD BY AUTOMATED COUNT: 13.8 % (ref 11.5–14.5)
GFR SERPL CREATININE-BSD FRML MDRD: 33 ML/MIN/1.73M*2
GLUCOSE BLD MANUAL STRIP-MCNC: 177 MG/DL (ref 74–99)
GLUCOSE BLD MANUAL STRIP-MCNC: 180 MG/DL (ref 74–99)
GLUCOSE BLD MANUAL STRIP-MCNC: 196 MG/DL (ref 74–99)
GLUCOSE BLD MANUAL STRIP-MCNC: 262 MG/DL (ref 74–99)
GLUCOSE BLD MANUAL STRIP-MCNC: 304 MG/DL (ref 74–99)
GLUCOSE SERPL-MCNC: 292 MG/DL (ref 74–99)
HCT VFR BLD AUTO: 34.7 % (ref 36–46)
HGB BLD-MCNC: 11.1 G/DL (ref 12–16)
MCH RBC QN AUTO: 30.1 PG (ref 26–34)
MCHC RBC AUTO-ENTMCNC: 32 G/DL (ref 32–36)
MCV RBC AUTO: 94 FL (ref 80–100)
NRBC BLD-RTO: 0 /100 WBCS (ref 0–0)
PLATELET # BLD AUTO: 196 X10*3/UL (ref 150–450)
POTASSIUM SERPL-SCNC: 4.1 MMOL/L (ref 3.5–5.3)
RBC # BLD AUTO: 3.69 X10*6/UL (ref 4–5.2)
SODIUM SERPL-SCNC: 134 MMOL/L (ref 136–145)
WBC # BLD AUTO: 4.9 X10*3/UL (ref 4.4–11.3)

## 2023-12-25 PROCEDURE — 2500000004 HC RX 250 GENERAL PHARMACY W/ HCPCS (ALT 636 FOR OP/ED): Performed by: PHYSICIAN ASSISTANT

## 2023-12-25 PROCEDURE — 2500000001 HC RX 250 WO HCPCS SELF ADMINISTERED DRUGS (ALT 637 FOR MEDICARE OP): Performed by: PHYSICIAN ASSISTANT

## 2023-12-25 PROCEDURE — 1200000002 HC GENERAL ROOM WITH TELEMETRY DAILY

## 2023-12-25 PROCEDURE — 36415 COLL VENOUS BLD VENIPUNCTURE: CPT | Performed by: INTERNAL MEDICINE

## 2023-12-25 PROCEDURE — 85027 COMPLETE CBC AUTOMATED: CPT | Performed by: INTERNAL MEDICINE

## 2023-12-25 PROCEDURE — 2500000001 HC RX 250 WO HCPCS SELF ADMINISTERED DRUGS (ALT 637 FOR MEDICARE OP): Performed by: HOSPITALIST

## 2023-12-25 PROCEDURE — 82947 ASSAY GLUCOSE BLOOD QUANT: CPT

## 2023-12-25 PROCEDURE — 2500000004 HC RX 250 GENERAL PHARMACY W/ HCPCS (ALT 636 FOR OP/ED): Performed by: REGISTERED NURSE

## 2023-12-25 PROCEDURE — 99233 SBSQ HOSP IP/OBS HIGH 50: CPT | Performed by: INTERNAL MEDICINE

## 2023-12-25 PROCEDURE — 2500000002 HC RX 250 W HCPCS SELF ADMINISTERED DRUGS (ALT 637 FOR MEDICARE OP, ALT 636 FOR OP/ED): Performed by: PHYSICIAN ASSISTANT

## 2023-12-25 PROCEDURE — 80051 ELECTROLYTE PANEL: CPT | Performed by: INTERNAL MEDICINE

## 2023-12-25 RX ADMIN — APIXABAN 2.5 MG: 2.5 TABLET, FILM COATED ORAL at 09:09

## 2023-12-25 RX ADMIN — DOCUSATE SODIUM 100 MG: 100 CAPSULE, LIQUID FILLED ORAL at 21:10

## 2023-12-25 RX ADMIN — GUAIFENESIN 600 MG: 600 TABLET, EXTENDED RELEASE ORAL at 21:10

## 2023-12-25 RX ADMIN — APIXABAN 2.5 MG: 2.5 TABLET, FILM COATED ORAL at 21:10

## 2023-12-25 RX ADMIN — PANTOPRAZOLE SODIUM 40 MG: 40 TABLET, DELAYED RELEASE ORAL at 06:30

## 2023-12-25 RX ADMIN — POLYETHYLENE GLYCOL 3350 17 G: 17 POWDER, FOR SOLUTION ORAL at 09:09

## 2023-12-25 RX ADMIN — ISOSORBIDE MONONITRATE 60 MG: 30 TABLET, EXTENDED RELEASE ORAL at 09:09

## 2023-12-25 RX ADMIN — INSULIN LISPRO 1 UNITS: 100 INJECTION, SOLUTION INTRAVENOUS; SUBCUTANEOUS at 16:29

## 2023-12-25 RX ADMIN — OSELTAMIVIR PHOSPHATE 30 MG: 30 CAPSULE ORAL at 09:12

## 2023-12-25 RX ADMIN — INSULIN LISPRO 4 UNITS: 100 INJECTION, SOLUTION INTRAVENOUS; SUBCUTANEOUS at 12:33

## 2023-12-25 RX ADMIN — DOCUSATE SODIUM 100 MG: 100 CAPSULE, LIQUID FILLED ORAL at 09:08

## 2023-12-25 RX ADMIN — AMIODARONE HYDROCHLORIDE 100 MG: 200 TABLET ORAL at 09:08

## 2023-12-25 RX ADMIN — CLOPIDOGREL 75 MG: 75 TABLET ORAL at 09:09

## 2023-12-25 RX ADMIN — INSULIN LISPRO 1 UNITS: 100 INJECTION, SOLUTION INTRAVENOUS; SUBCUTANEOUS at 09:17

## 2023-12-25 RX ADMIN — GUAIFENESIN 600 MG: 600 TABLET, EXTENDED RELEASE ORAL at 09:09

## 2023-12-25 RX ADMIN — ATORVASTATIN CALCIUM 80 MG: 80 TABLET, FILM COATED ORAL at 21:10

## 2023-12-25 RX ADMIN — METOPROLOL SUCCINATE 12.5 MG: 25 TABLET, EXTENDED RELEASE ORAL at 21:10

## 2023-12-25 ASSESSMENT — COGNITIVE AND FUNCTIONAL STATUS - GENERAL
CLIMB 3 TO 5 STEPS WITH RAILING: TOTAL
MOVING TO AND FROM BED TO CHAIR: A LITTLE
WALKING IN HOSPITAL ROOM: A LOT
MOVING FROM LYING ON BACK TO SITTING ON SIDE OF FLAT BED WITH BEDRAILS: A LITTLE
HELP NEEDED FOR BATHING: A LOT
DAILY ACTIVITIY SCORE: 16
CLIMB 3 TO 5 STEPS WITH RAILING: TOTAL
MOVING FROM LYING ON BACK TO SITTING ON SIDE OF FLAT BED WITH BEDRAILS: A LITTLE
HELP NEEDED FOR BATHING: A LOT
TOILETING: A LOT
MOVING TO AND FROM BED TO CHAIR: A LOT
DRESSING REGULAR UPPER BODY CLOTHING: A LITTLE
MOBILITY SCORE: 13
WALKING IN HOSPITAL ROOM: A LOT
TURNING FROM BACK TO SIDE WHILE IN FLAT BAD: A LITTLE
TOILETING: A LOT
DRESSING REGULAR LOWER BODY CLOTHING: A LOT
MOBILITY SCORE: 15
PERSONAL GROOMING: A LITTLE
STANDING UP FROM CHAIR USING ARMS: A LOT
DRESSING REGULAR UPPER BODY CLOTHING: A LITTLE
STANDING UP FROM CHAIR USING ARMS: A LITTLE
DRESSING REGULAR LOWER BODY CLOTHING: A LOT
PERSONAL GROOMING: A LITTLE
DAILY ACTIVITIY SCORE: 16
TURNING FROM BACK TO SIDE WHILE IN FLAT BAD: A LITTLE

## 2023-12-25 ASSESSMENT — PAIN - FUNCTIONAL ASSESSMENT
PAIN_FUNCTIONAL_ASSESSMENT: 0-10

## 2023-12-25 ASSESSMENT — PAIN SCALES - GENERAL
PAINLEVEL_OUTOF10: 0 - NO PAIN

## 2023-12-25 NOTE — PROGRESS NOTES
12/25/2023 1552 Patient not medically ready for discharge today. Tian Tcc notes: Patient alone during the day. Family for dinner nightly and sleeps over night with her. Will need home O2 eval.   Hellen SAM RN TCC CCM

## 2023-12-25 NOTE — CARE PLAN
The patient's goals for the shift include Pt. will have a safe, restful and uneventful evening    The clinical goals for the shift include Pt. will have adequate relief of cough by end of shift.    Problem: Pain  Goal: My pain/discomfort is manageable  Outcome: Progressing     Problem: Safety  Goal: Patient will be injury free during hospitalization  Outcome: Progressing  Goal: I will remain free of falls  Outcome: Progressing     Problem: Daily Care  Goal: Daily care needs are met  Outcome: Progressing     Problem: Psychosocial Needs  Goal: Demonstrates ability to cope with hospitalization/illness  Outcome: Progressing  Goal: Collaborate with me, my family, and caregiver to identify my specific goals  Outcome: Progressing     Problem: Discharge Barriers  Goal: My discharge needs are met  Outcome: Progressing     Problem: Skin  Goal: Decreased wound size/increased tissue granulation at next dressing change  Outcome: Progressing  Goal: Participates in plan/prevention/treatment measures  Outcome: Progressing  Goal: Prevent/manage excess moisture  Outcome: Progressing  Goal: Prevent/minimize sheer/friction injuries  Outcome: Progressing  Goal: Promote/optimize nutrition  Outcome: Progressing  Goal: Promote skin healing  Outcome: Progressing     Problem: Diabetes  Goal: Achieve decreasing blood glucose levels by end of shift  Outcome: Progressing  Goal: Increase stability of blood glucose readings by end of shift  Outcome: Progressing  Goal: Decrease in ketones present in urine by end of shift  Outcome: Progressing  Goal: Maintain electrolyte levels within acceptable range throughout shift  Outcome: Progressing  Goal: Maintain glucose levels >70mg/dl to <250mg/dl throughout shift  Outcome: Progressing  Goal: No changes in neurological exam by end of shift  Outcome: Progressing  Goal: Learn about and adhere to nutrition recommendations by end of shift  Outcome: Progressing  Goal: Vital signs within normal range for age  by end of shift  Outcome: Progressing  Goal: Increase self care and/or family involovement by end of shift  Outcome: Progressing  Goal: Receive DSME education by end of shift  Outcome: Progressing     Problem: Pain - Adult  Goal: Verbalizes/displays adequate comfort level or baseline comfort level  Outcome: Progressing     Problem: Safety - Adult  Goal: Free from fall injury  Outcome: Progressing     Problem: Discharge Planning  Goal: Discharge to home or other facility with appropriate resources  Outcome: Progressing

## 2023-12-25 NOTE — PROGRESS NOTES
"Shona Montgomery is a 89 y.o. female on day 2 of admission presenting with Influenza A.    Subjective   States she is doing much better sitting up in a chair, on 2-1/2 L nasal cannula oxygen, cough states sputum is starting to be brought up.  Updated son at bedside       Objective     Physical Exam  Constitutional:       Appearance: Normal appearance.   HENT:      Head: Normocephalic.      Nose: Nose normal.   Cardiovascular:      Rate and Rhythm: Normal rate and regular rhythm.      Pulses: Normal pulses.      Heart sounds: Normal heart sounds.   Pulmonary:      Effort: Pulmonary effort is normal.      Breath sounds: Normal breath sounds.   Abdominal:      General: Abdomen is flat. Bowel sounds are normal.      Palpations: Abdomen is soft.   Skin:     Capillary Refill: Capillary refill takes less than 2 seconds.   Neurological:      General: No focal deficit present.      Mental Status: She is alert.         Last Recorded Vitals  Blood pressure (!) 136/43, pulse 60, temperature 36.6 °C (97.8 °F), temperature source Oral, resp. rate 18, height 1.6 m (5' 3\"), weight 50.8 kg (112 lb), SpO2 95 %.  Intake/Output last 3 Shifts:  I/O last 3 completed shifts:  In: 725 (14.3 mL/kg) [P.O.:725]  Out: 550 (10.8 mL/kg) [Urine:550 (0.3 mL/kg/hr)]  Weight: 50.8 kg     Relevant Results  Results for orders placed or performed during the hospital encounter of 12/22/23 (from the past 24 hour(s))   POCT GLUCOSE   Result Value Ref Range    POCT Glucose 193 (H) 74 - 99 mg/dL   POCT GLUCOSE   Result Value Ref Range    POCT Glucose 196 (H) 74 - 99 mg/dL   POCT GLUCOSE   Result Value Ref Range    POCT Glucose 180 (H) 74 - 99 mg/dL       Imaging Results  Cxr:IMPRESSION:  No evidence consolidating infiltrate or effusion.      Medications:  amiodarone, 100 mg, oral, Daily  amLODIPine, 5 mg, oral, Daily  apixaban, 2.5 mg, oral, BID  atorvastatin, 80 mg, oral, Nightly  clopidogrel, 75 mg, oral, Daily  docusate sodium, 100 mg, oral, BID  furosemide, " 20 mg, oral, Daily  guaiFENesin, 600 mg, oral, BID  insulin lispro, 0-5 Units, subcutaneous, TID with meals  isosorbide mononitrate ER, 60 mg, oral, Daily  [Held by provider] losartan, 50 mg, oral, BID  melatonin, 3 mg, oral, Daily  metoprolol succinate XL, 12.5 mg, oral, Daily  oseltamivir, 30 mg, oral, Daily  pantoprazole, 40 mg, oral, Daily before breakfast   Or  pantoprazole, 40 mg, intravenous, Daily before breakfast  polyethylene glycol, 17 g, oral, Daily  timolol, 1 drop, Left Eye, BID       PRN medications: acetaminophen **OR** acetaminophen **OR** acetaminophen, albuterol, benzonatate, dextrose 10 % in water (D10W), dextrose, glucagon, guaiFENesin, nitroglycerin, ondansetron ODT **OR** ondansetron     Assessment/Plan   1.  Influenza A with acute hypoxic respiratory failure  - on 2.5 L nasal cannula oxygen, complete dose of Tamiflu.    2.  CKD 3  - patient's baseline is between 1.4 and 1.6, currently at baseline    3. non-MI troponin elevation    4. paroxysmal A-fib  - on Eliquis    5. nonischemic cardiomyopathy with history of coronary disease status post CABG  - continue cardioprotective measures       updated son at bedside, likely can discharge home in the morning will need home oxygen study    DVT Prophylaxis:  Nacho Castillo MD  [facility]

## 2023-12-26 ENCOUNTER — PHARMACY VISIT (OUTPATIENT)
Dept: PHARMACY | Facility: CLINIC | Age: 88
End: 2023-12-26
Payer: MEDICARE

## 2023-12-26 ENCOUNTER — APPOINTMENT (OUTPATIENT)
Dept: CARDIOLOGY | Facility: HOSPITAL | Age: 88
DRG: 193 | End: 2023-12-26
Payer: MEDICARE

## 2023-12-26 VITALS
HEART RATE: 67 BPM | WEIGHT: 112 LBS | BODY MASS INDEX: 19.84 KG/M2 | RESPIRATION RATE: 18 BRPM | HEIGHT: 63 IN | OXYGEN SATURATION: 94 % | TEMPERATURE: 97.6 F | SYSTOLIC BLOOD PRESSURE: 158 MMHG | DIASTOLIC BLOOD PRESSURE: 72 MMHG

## 2023-12-26 LAB
ANION GAP SERPL CALC-SCNC: 11 MMOL/L (ref 10–20)
BUN SERPL-MCNC: 42 MG/DL (ref 6–23)
CALCIUM SERPL-MCNC: 9 MG/DL (ref 8.6–10.3)
CHLORIDE SERPL-SCNC: 103 MMOL/L (ref 98–107)
CO2 SERPL-SCNC: 26 MMOL/L (ref 21–32)
CREAT SERPL-MCNC: 1.62 MG/DL (ref 0.5–1.05)
ERYTHROCYTE [DISTWIDTH] IN BLOOD BY AUTOMATED COUNT: 13.8 % (ref 11.5–14.5)
GFR SERPL CREATININE-BSD FRML MDRD: 29 ML/MIN/1.73M*2
GLUCOSE BLD MANUAL STRIP-MCNC: 160 MG/DL (ref 74–99)
GLUCOSE BLD MANUAL STRIP-MCNC: 256 MG/DL (ref 74–99)
GLUCOSE SERPL-MCNC: 172 MG/DL (ref 74–99)
HCT VFR BLD AUTO: 33 % (ref 36–46)
HGB BLD-MCNC: 10.7 G/DL (ref 12–16)
MCH RBC QN AUTO: 30.7 PG (ref 26–34)
MCHC RBC AUTO-ENTMCNC: 32.4 G/DL (ref 32–36)
MCV RBC AUTO: 95 FL (ref 80–100)
NRBC BLD-RTO: 0 /100 WBCS (ref 0–0)
PLATELET # BLD AUTO: 195 X10*3/UL (ref 150–450)
POTASSIUM SERPL-SCNC: 3.8 MMOL/L (ref 3.5–5.3)
RBC # BLD AUTO: 3.49 X10*6/UL (ref 4–5.2)
SODIUM SERPL-SCNC: 136 MMOL/L (ref 136–145)
WBC # BLD AUTO: 3.5 X10*3/UL (ref 4.4–11.3)

## 2023-12-26 PROCEDURE — 82947 ASSAY GLUCOSE BLOOD QUANT: CPT

## 2023-12-26 PROCEDURE — 80048 BASIC METABOLIC PNL TOTAL CA: CPT | Performed by: INTERNAL MEDICINE

## 2023-12-26 PROCEDURE — RXMED WILLOW AMBULATORY MEDICATION CHARGE

## 2023-12-26 PROCEDURE — 2500000002 HC RX 250 W HCPCS SELF ADMINISTERED DRUGS (ALT 637 FOR MEDICARE OP, ALT 636 FOR OP/ED): Performed by: PHYSICIAN ASSISTANT

## 2023-12-26 PROCEDURE — 2500000001 HC RX 250 WO HCPCS SELF ADMINISTERED DRUGS (ALT 637 FOR MEDICARE OP): Performed by: PHYSICIAN ASSISTANT

## 2023-12-26 PROCEDURE — 99239 HOSP IP/OBS DSCHRG MGMT >30: CPT | Performed by: NURSE PRACTITIONER

## 2023-12-26 PROCEDURE — 85027 COMPLETE CBC AUTOMATED: CPT | Performed by: INTERNAL MEDICINE

## 2023-12-26 PROCEDURE — 2500000004 HC RX 250 GENERAL PHARMACY W/ HCPCS (ALT 636 FOR OP/ED): Performed by: PHYSICIAN ASSISTANT

## 2023-12-26 PROCEDURE — 2500000001 HC RX 250 WO HCPCS SELF ADMINISTERED DRUGS (ALT 637 FOR MEDICARE OP): Performed by: HOSPITALIST

## 2023-12-26 PROCEDURE — 2500000004 HC RX 250 GENERAL PHARMACY W/ HCPCS (ALT 636 FOR OP/ED): Performed by: REGISTERED NURSE

## 2023-12-26 PROCEDURE — 36415 COLL VENOUS BLD VENIPUNCTURE: CPT | Performed by: INTERNAL MEDICINE

## 2023-12-26 PROCEDURE — 93005 ELECTROCARDIOGRAM TRACING: CPT

## 2023-12-26 RX ORDER — OSELTAMIVIR PHOSPHATE 30 MG/1
30 CAPSULE ORAL DAILY
Qty: 1 CAPSULE | Refills: 0 | Status: SHIPPED | OUTPATIENT
Start: 2023-12-27 | End: 2023-12-28

## 2023-12-26 RX ORDER — ISOSORBIDE MONONITRATE 60 MG/1
60 TABLET, EXTENDED RELEASE ORAL DAILY
Start: 2023-12-26 | End: 2024-01-09 | Stop reason: SDUPTHER

## 2023-12-26 RX ADMIN — ISOSORBIDE MONONITRATE 60 MG: 30 TABLET, EXTENDED RELEASE ORAL at 10:25

## 2023-12-26 RX ADMIN — DOCUSATE SODIUM 100 MG: 100 CAPSULE, LIQUID FILLED ORAL at 10:25

## 2023-12-26 RX ADMIN — GUAIFENESIN 600 MG: 600 TABLET, EXTENDED RELEASE ORAL at 10:26

## 2023-12-26 RX ADMIN — AMLODIPINE BESYLATE 5 MG: 5 TABLET ORAL at 10:25

## 2023-12-26 RX ADMIN — INSULIN LISPRO 1 UNITS: 100 INJECTION, SOLUTION INTRAVENOUS; SUBCUTANEOUS at 10:27

## 2023-12-26 RX ADMIN — CLOPIDOGREL 75 MG: 75 TABLET ORAL at 10:27

## 2023-12-26 RX ADMIN — INSULIN LISPRO 3 UNITS: 100 INJECTION, SOLUTION INTRAVENOUS; SUBCUTANEOUS at 12:36

## 2023-12-26 RX ADMIN — PANTOPRAZOLE SODIUM 40 MG: 40 TABLET, DELAYED RELEASE ORAL at 06:29

## 2023-12-26 RX ADMIN — AMIODARONE HYDROCHLORIDE 100 MG: 200 TABLET ORAL at 10:26

## 2023-12-26 RX ADMIN — FUROSEMIDE 20 MG: 20 TABLET ORAL at 10:25

## 2023-12-26 RX ADMIN — APIXABAN 2.5 MG: 2.5 TABLET, FILM COATED ORAL at 10:27

## 2023-12-26 RX ADMIN — OSELTAMIVIR PHOSPHATE 30 MG: 30 CAPSULE ORAL at 10:27

## 2023-12-26 ASSESSMENT — COGNITIVE AND FUNCTIONAL STATUS - GENERAL
HELP NEEDED FOR BATHING: A LITTLE
MOVING TO AND FROM BED TO CHAIR: A LITTLE
DAILY ACTIVITIY SCORE: 18
TOILETING: A LOT
STANDING UP FROM CHAIR USING ARMS: A LITTLE
WALKING IN HOSPITAL ROOM: A LOT
DRESSING REGULAR UPPER BODY CLOTHING: A LITTLE
DRESSING REGULAR LOWER BODY CLOTHING: A LITTLE
CLIMB 3 TO 5 STEPS WITH RAILING: A LOT
TURNING FROM BACK TO SIDE WHILE IN FLAT BAD: A LITTLE
MOBILITY SCORE: 16
PERSONAL GROOMING: A LITTLE
MOVING FROM LYING ON BACK TO SITTING ON SIDE OF FLAT BED WITH BEDRAILS: A LITTLE

## 2023-12-26 ASSESSMENT — PAIN SCALES - GENERAL
PAINLEVEL_OUTOF10: 0 - NO PAIN
PAINLEVEL_OUTOF10: 0 - NO PAIN

## 2023-12-26 ASSESSMENT — PAIN - FUNCTIONAL ASSESSMENT
PAIN_FUNCTIONAL_ASSESSMENT: 0-10
PAIN_FUNCTIONAL_ASSESSMENT: 0-10

## 2023-12-26 NOTE — PROGRESS NOTES
12/26/2023 1137 Patient medically ready for discharge today. CNP updates no need for Home O2 evaluation. Chart reviewed. Pox 97 % room air since 0100 today. Family to transport home. No skilled needs. Family stays with patient overnight.   Hellen REECEN RN TCC CCM

## 2023-12-26 NOTE — CARE PLAN
The patient's goals for the shift include Pt. will have a safe, restful and uneventful evening    The clinical goals for the shift include Pt. will remain afebrile this shift    Problem: Pain  Goal: My pain/discomfort is manageable  Outcome: Progressing     Problem: Safety  Goal: Patient will be injury free during hospitalization  Outcome: Progressing  Goal: I will remain free of falls  Outcome: Progressing     Problem: Daily Care  Goal: Daily care needs are met  Outcome: Progressing     Problem: Psychosocial Needs  Goal: Demonstrates ability to cope with hospitalization/illness  Outcome: Progressing  Goal: Collaborate with me, my family, and caregiver to identify my specific goals  Outcome: Progressing     Problem: Discharge Barriers  Goal: My discharge needs are met  Outcome: Progressing     Problem: Skin  Goal: Decreased wound size/increased tissue granulation at next dressing change  Outcome: Progressing  Goal: Participates in plan/prevention/treatment measures  Outcome: Progressing  Goal: Prevent/manage excess moisture  Outcome: Progressing  Goal: Prevent/minimize sheer/friction injuries  Outcome: Progressing  Goal: Promote/optimize nutrition  Outcome: Progressing  Goal: Promote skin healing  Outcome: Progressing     Problem: Diabetes  Goal: Achieve decreasing blood glucose levels by end of shift  Outcome: Progressing  Goal: Increase stability of blood glucose readings by end of shift  Outcome: Progressing  Goal: Decrease in ketones present in urine by end of shift  Outcome: Progressing  Goal: Maintain electrolyte levels within acceptable range throughout shift  Outcome: Progressing  Goal: Maintain glucose levels >70mg/dl to <250mg/dl throughout shift  Outcome: Progressing  Goal: No changes in neurological exam by end of shift  Outcome: Progressing  Goal: Learn about and adhere to nutrition recommendations by end of shift  Outcome: Progressing  Goal: Vital signs within normal range for age by end of  shift  Outcome: Progressing  Goal: Increase self care and/or family involovement by end of shift  Outcome: Progressing  Goal: Receive DSME education by end of shift  Outcome: Progressing     Problem: Pain - Adult  Goal: Verbalizes/displays adequate comfort level or baseline comfort level  Outcome: Progressing     Problem: Safety - Adult  Goal: Free from fall injury  Outcome: Progressing     Problem: Discharge Planning  Goal: Discharge to home or other facility with appropriate resources  Outcome: Progressing     Problem: Chronic Conditions and Co-morbidities  Goal: Patient's chronic conditions and co-morbidity symptoms are monitored and maintained or improved  Outcome: Progressing

## 2023-12-27 LAB
ATRIAL RATE: 63 BPM
ATRIAL RATE: 69 BPM
P AXIS: 61 DEGREES
P AXIS: 75 DEGREES
P OFFSET: 208 MS
P OFFSET: 208 MS
P ONSET: 148 MS
P ONSET: 148 MS
PR INTERVAL: 142 MS
PR INTERVAL: 144 MS
Q ONSET: 219 MS
Q ONSET: 220 MS
QRS COUNT: 10 BEATS
QRS COUNT: 11 BEATS
QRS DURATION: 100 MS
QRS DURATION: 112 MS
QT INTERVAL: 392 MS
QT INTERVAL: 396 MS
QTC CALCULATION(BAZETT): 405 MS
QTC CALCULATION(BAZETT): 420 MS
QTC FREDERICIA: 402 MS
QTC FREDERICIA: 410 MS
R AXIS: 83 DEGREES
R AXIS: 86 DEGREES
T AXIS: 140 DEGREES
T AXIS: 201 DEGREES
T OFFSET: 416 MS
T OFFSET: 417 MS
VENTRICULAR RATE: 63 BPM
VENTRICULAR RATE: 69 BPM

## 2024-01-04 DIAGNOSIS — Z79.4 TYPE 2 DIABETES MELLITUS WITHOUT COMPLICATION, WITH LONG-TERM CURRENT USE OF INSULIN (MULTI): ICD-10-CM

## 2024-01-04 DIAGNOSIS — E11.9 TYPE 2 DIABETES MELLITUS WITHOUT COMPLICATION, WITH LONG-TERM CURRENT USE OF INSULIN (MULTI): ICD-10-CM

## 2024-01-04 RX ORDER — LANCETS
1 EACH MISCELLANEOUS 3 TIMES DAILY
Qty: 300 EACH | Refills: 3 | Status: SHIPPED | OUTPATIENT
Start: 2024-01-04 | End: 2024-01-11 | Stop reason: SDUPTHER

## 2024-01-08 NOTE — DISCHARGE SUMMARY
Discharge Diagnosis  Influenza A    Issues Requiring Follow-Up  PCP follow up     Discharge Meds     Your medication list        START taking these medications        Instructions Last Dose Given Next Dose Due   oseltamivir 30 mg capsule  Commonly known as: Tamiflu  Start taking on: December 27, 2023      Take 1 capsule (30 mg) by mouth once daily for 1 dose. Do not start before December 27, 2023.              CHANGE how you take these medications        Instructions Last Dose Given Next Dose Due   isosorbide mononitrate ER 60 mg 24 hr tablet  Commonly known as: Imdur  What changed: how much to take      Take 1 tablet (60 mg) by mouth once daily.              CONTINUE taking these medications        Instructions Last Dose Given Next Dose Due   amiodarone 100 mg tablet  Commonly known as: Pacerone           amLODIPine 2.5 mg tablet  Commonly known as: Norvasc           atorvastatin 80 mg tablet  Commonly known as: Lipitor           clopidogrel 75 mg tablet  Commonly known as: Plavix           Colace 100 mg capsule  Generic drug: docusate sodium           cyanocobalamin 500 mcg tablet  Commonly known as: Vitamin B-12           Eliquis 2.5 mg tablet  Generic drug: apixaban           ferrous sulfate 325 (65 Fe) MG EC tablet           furosemide 20 mg tablet  Commonly known as: Lasix           lancets misc  Commonly known as: Accu-Chek Fastclix Lancet Drum      1 Lancet 3 times a day.       losartan 100 mg tablet  Commonly known as: Cozaar           metoprolol succinate XL 25 mg 24 hr tablet  Commonly known as: Toprol-XL      1/2 tablet by mouth daily       nitroglycerin 0.4 mg SL tablet  Commonly known as: Nitrostat      Place 1 tablet (0.4 mg) under the tongue every 5 minutes if needed for chest pain.       OneTouch Ultra Test strip  Generic drug: blood sugar diagnostic           timolol 0.5 % ophthalmic solution  Commonly known as: Timoptic           Tresiba FlexTouch U-100 100 unit/mL (3 mL) injection  Generic drug:  insulin degludec                     Where to Get Your Medications        These medications were sent to [facility]  [address] , [address], [facility] [address]      Hours: 8 AM to 6 PM Mon-Fri, 9 AM to 1 PM Saturday Phone: [phone]   oseltamivir 30 mg capsule       Information about where to get these medications is not yet available    Ask your nurse or doctor about these medications  isosorbide mononitrate ER 60 mg 24 hr tablet         Test Results Pending At Discharge  Pending Labs       No current pending labs.            Hospital Course       1.  Influenza A with acute hypoxic respiratory failure  - on 2.5 L nasal cannula oxygen, reduced to room air, complete dose of Tamiflu.     2.  CKD 3  - patient's baseline is between 1.4 and 1.6, currently at baseline     3. non-MI troponin elevation     4. paroxysmal A-fib  - on Eliquis     5. nonischemic cardiomyopathy with history of coronary disease status post CABG  - continue cardioprotective measures         updated son at bedside, discharge home, no need 02 home needs  Pertinent Physical Exam At Time of Discharge  Physical Exam  Constitutional:       Appearance: Normal appearance.   HENT:      Head: Normocephalic.      Nose: Nose normal.   Cardiovascular:      Rate and Rhythm: Normal rate and regular rhythm.      Pulses: Normal pulses.      Heart sounds: Normal heart sounds.   Pulmonary:      Effort: Pulmonary effort is normal.      Breath sounds: Normal breath sounds.   Abdominal:      General: Abdomen is flat. Bowel sounds are normal.      Palpations: Abdomen is soft.   Skin:     Capillary Refill: Capillary refill takes less than 2 seconds.   Neurological:      General: No focal deficit present.      Mental Status: She is alert.   Outpatient Follow-Up  Future Appointments   Date Time Provider Department Center   1/9/2024  2:45 PM Mohsen Mendiola MD [id number]   2/15/2024  4:00 PM Martha Domingo MD [id number] [facility]   4/30/2024  3:00  PM Mohsen Mendiola MD [id number]   6/5/2024  2:00 PM Lucas Camacho MD [id number]         Teddy Marte, APRN-CNP

## 2024-01-09 ENCOUNTER — OFFICE VISIT (OUTPATIENT)
Dept: CARDIOLOGY | Facility: HOSPITAL | Age: 89
End: 2024-01-09
Payer: MEDICARE

## 2024-01-09 VITALS
DIASTOLIC BLOOD PRESSURE: 75 MMHG | HEIGHT: 63 IN | BODY MASS INDEX: 19.68 KG/M2 | HEART RATE: 53 BPM | SYSTOLIC BLOOD PRESSURE: 145 MMHG | WEIGHT: 111.1 LBS

## 2024-01-09 DIAGNOSIS — I48.20 CHRONIC ATRIAL FIBRILLATION (MULTI): Primary | ICD-10-CM

## 2024-01-09 DIAGNOSIS — I10 PRIMARY HYPERTENSION: ICD-10-CM

## 2024-01-09 DIAGNOSIS — I20.89 STABLE ANGINA PECTORIS (CMS-HCC): ICD-10-CM

## 2024-01-09 DIAGNOSIS — E78.00 PURE HYPERCHOLESTEROLEMIA: ICD-10-CM

## 2024-01-09 PROCEDURE — 93005 ELECTROCARDIOGRAM TRACING: CPT | Performed by: INTERNAL MEDICINE

## 2024-01-09 PROCEDURE — 1036F TOBACCO NON-USER: CPT | Performed by: INTERNAL MEDICINE

## 2024-01-09 PROCEDURE — 1111F DSCHRG MED/CURRENT MED MERGE: CPT | Performed by: INTERNAL MEDICINE

## 2024-01-09 PROCEDURE — 1160F RVW MEDS BY RX/DR IN RCRD: CPT | Performed by: INTERNAL MEDICINE

## 2024-01-09 PROCEDURE — 1126F AMNT PAIN NOTED NONE PRSNT: CPT | Performed by: INTERNAL MEDICINE

## 2024-01-09 PROCEDURE — 99214 OFFICE O/P EST MOD 30 MIN: CPT | Performed by: INTERNAL MEDICINE

## 2024-01-09 PROCEDURE — 3078F DIAST BP <80 MM HG: CPT | Performed by: INTERNAL MEDICINE

## 2024-01-09 PROCEDURE — 93010 ELECTROCARDIOGRAM REPORT: CPT | Performed by: INTERNAL MEDICINE

## 2024-01-09 PROCEDURE — 3077F SYST BP >= 140 MM HG: CPT | Performed by: INTERNAL MEDICINE

## 2024-01-09 PROCEDURE — 1159F MED LIST DOCD IN RCRD: CPT | Performed by: INTERNAL MEDICINE

## 2024-01-09 RX ORDER — LOSARTAN POTASSIUM 100 MG/1
50 TABLET ORAL 2 TIMES DAILY
Qty: 90 TABLET | Refills: 3 | Status: SHIPPED | OUTPATIENT
Start: 2024-01-09 | End: 2025-01-08

## 2024-01-09 RX ORDER — AMIODARONE HYDROCHLORIDE 100 MG/1
100 TABLET ORAL DAILY
Qty: 90 TABLET | Refills: 3 | Status: SHIPPED | OUTPATIENT
Start: 2024-01-09 | End: 2025-01-08

## 2024-01-09 RX ORDER — CLOPIDOGREL BISULFATE 75 MG/1
75 TABLET ORAL DAILY
Qty: 90 TABLET | Refills: 3 | Status: SHIPPED | OUTPATIENT
Start: 2024-01-09 | End: 2025-01-08

## 2024-01-09 RX ORDER — FUROSEMIDE 20 MG/1
20 TABLET ORAL DAILY
Qty: 90 TABLET | Refills: 3 | Status: SHIPPED | OUTPATIENT
Start: 2024-01-09 | End: 2024-04-24

## 2024-01-09 RX ORDER — AMLODIPINE BESYLATE 2.5 MG/1
2.5 TABLET ORAL DAILY
Qty: 90 TABLET | Refills: 3 | Status: SHIPPED | OUTPATIENT
Start: 2024-01-09 | End: 2025-01-08

## 2024-01-09 RX ORDER — ATORVASTATIN CALCIUM 80 MG/1
80 TABLET, FILM COATED ORAL NIGHTLY
Qty: 90 TABLET | Refills: 3 | Status: SHIPPED | OUTPATIENT
Start: 2024-01-09 | End: 2025-01-08

## 2024-01-09 RX ORDER — METOPROLOL SUCCINATE 25 MG/1
TABLET, EXTENDED RELEASE ORAL
Qty: 45 TABLET | Refills: 3 | Status: SHIPPED | OUTPATIENT
Start: 2024-01-09

## 2024-01-09 RX ORDER — ISOSORBIDE MONONITRATE 60 MG/1
60 TABLET, EXTENDED RELEASE ORAL DAILY
Qty: 90 TABLET | Refills: 3 | Status: SHIPPED | OUTPATIENT
Start: 2024-01-09 | End: 2025-01-08

## 2024-01-09 ASSESSMENT — ENCOUNTER SYMPTOMS
DEPRESSION: 0
OCCASIONAL FEELINGS OF UNSTEADINESS: 0
LOSS OF SENSATION IN FEET: 0

## 2024-01-09 NOTE — PROGRESS NOTES
"Chief Complaint   Patient presents with     Cough       Initial /72  Pulse 92  Temp 97.6  F (36.4  C) (Temporal)  Resp 14  Wt 128 lb 9.6 oz (58.3 kg)  SpO2 100%  BMI 22.36 kg/m2 Estimated body mass index is 22.36 kg/(m^2) as calculated from the following:    Height as of 10/12/17: 5' 3.58\" (1.615 m).    Weight as of this encounter: 128 lb 9.6 oz (58.3 kg).  Medication Reconciliation: complete  " Subjective:  Patient returns for a hospital follow-up.  She was hospitalized for several days just before Walter with influenza A.  She did have her isosorbide decreased due to some borderline low blood pressures.  She generally is recuperating reasonably well but still does not have her stamina back completely.  Her minimally productive cough has largely resolved.  She denies any chest pain or palpitations.  She denies any bleeding problems despite good compliance with her Eliquis.  She overall is generally comfortable with her progress and is anticipating getting her other cataract removed.    Objective:  General: Alert, usual pleasant elderly self.  HEENT: Unchanged.  Lungs: Generally clear without rosette crackles or wheezing.  Cardiac: Normal S1 and S2 with 1-2/6 systolic murmur.  Abdomen: Nontender.  Extremities: Mild left ankle edema.  Skin: No acute rash or ecchymoses.  Neuro: Grossly unchanged.    EKG: Sinus bradycardia.  Nonspecific ST and T wave abnormality.    Impression/plan:  Patient remains clinically stable with her known coronary disease and cardiomyopathy status post remote CABG.  She appears to be euvolemic at this time and is not having any concerning anginal type symptomatology.  She is maintaining normal sinus rhythm and is appropriately anticoagulated for her PAF.  She remains on amiodarone as well.  She also remains on high-dose statin therapy.  I elected to continue her medicines unchanged and did not think we needed to embark on any repeat cardiovascular testing.  She is cleared for cataract surgery.  She should stay on her Plavix for the surgery, but she can come off her Eliquis for 2 to 3 days if her ophthalmologist thinks she needs to.  I will see her back for routine follow-up in April as originally scheduled.    Patient instructions:    Continue current medications unchanged.    Return to clinic in April as previously scheduled.    You are cleared for cataract surgery.    You may hold  your Eliquis for 2 to 3 days before cataract surgery if needed but should stay on your Plavix.

## 2024-01-10 LAB
ATRIAL RATE: 53 BPM
P AXIS: 77 DEGREES
P OFFSET: 206 MS
P ONSET: 139 MS
PR INTERVAL: 162 MS
Q ONSET: 220 MS
QRS COUNT: 9 BEATS
QRS DURATION: 110 MS
QT INTERVAL: 436 MS
QTC CALCULATION(BAZETT): 409 MS
QTC FREDERICIA: 418 MS
R AXIS: 21 DEGREES
T AXIS: 95 DEGREES
T OFFSET: 438 MS
VENTRICULAR RATE: 53 BPM

## 2024-01-11 ENCOUNTER — TELEPHONE (OUTPATIENT)
Dept: PRIMARY CARE | Facility: CLINIC | Age: 89
End: 2024-01-11
Payer: MEDICARE

## 2024-01-11 DIAGNOSIS — E11.9 TYPE 2 DIABETES MELLITUS WITHOUT COMPLICATION, WITH LONG-TERM CURRENT USE OF INSULIN (MULTI): ICD-10-CM

## 2024-01-11 DIAGNOSIS — Z79.4 TYPE 2 DIABETES MELLITUS WITHOUT COMPLICATION, WITH LONG-TERM CURRENT USE OF INSULIN (MULTI): ICD-10-CM

## 2024-01-11 RX ORDER — LANCETS
1 EACH MISCELLANEOUS 3 TIMES DAILY
Qty: 300 EACH | Refills: 3 | Status: SHIPPED | OUTPATIENT
Start: 2024-01-11 | End: 2025-01-10

## 2024-01-11 RX ORDER — LANCETS
1 EACH MISCELLANEOUS 3 TIMES DAILY
Qty: 300 EACH | Refills: 3 | Status: SHIPPED | OUTPATIENT
Start: 2024-01-11 | End: 2024-01-11 | Stop reason: SDUPTHER

## 2024-02-05 NOTE — TELEPHONE ENCOUNTER
Refill for Metoprolol Succinate previous completed.  No new action required.  Dx for refill corrected.

## 2024-02-15 ENCOUNTER — OFFICE VISIT (OUTPATIENT)
Dept: ENDOCRINOLOGY | Facility: CLINIC | Age: 89
End: 2024-02-15
Payer: MEDICARE

## 2024-02-15 VITALS
HEIGHT: 63 IN | BODY MASS INDEX: 19.77 KG/M2 | WEIGHT: 111.6 LBS | HEART RATE: 76 BPM | RESPIRATION RATE: 16 BRPM | DIASTOLIC BLOOD PRESSURE: 76 MMHG | SYSTOLIC BLOOD PRESSURE: 130 MMHG

## 2024-02-15 DIAGNOSIS — Z79.4 TYPE 2 DIABETES MELLITUS WITHOUT COMPLICATION, WITH LONG-TERM CURRENT USE OF INSULIN (MULTI): Primary | ICD-10-CM

## 2024-02-15 DIAGNOSIS — E78.00 PURE HYPERCHOLESTEROLEMIA: ICD-10-CM

## 2024-02-15 DIAGNOSIS — E03.8 ADULT ONSET HYPOTHYROIDISM: ICD-10-CM

## 2024-02-15 DIAGNOSIS — I10 PRIMARY HYPERTENSION: ICD-10-CM

## 2024-02-15 DIAGNOSIS — E11.9 TYPE 2 DIABETES MELLITUS WITHOUT COMPLICATION, WITH LONG-TERM CURRENT USE OF INSULIN (MULTI): Primary | ICD-10-CM

## 2024-02-15 PROCEDURE — 99214 OFFICE O/P EST MOD 30 MIN: CPT | Performed by: INTERNAL MEDICINE

## 2024-02-15 PROCEDURE — 1126F AMNT PAIN NOTED NONE PRSNT: CPT | Performed by: INTERNAL MEDICINE

## 2024-02-15 PROCEDURE — 3078F DIAST BP <80 MM HG: CPT | Performed by: INTERNAL MEDICINE

## 2024-02-15 PROCEDURE — 1159F MED LIST DOCD IN RCRD: CPT | Performed by: INTERNAL MEDICINE

## 2024-02-15 PROCEDURE — 1160F RVW MEDS BY RX/DR IN RCRD: CPT | Performed by: INTERNAL MEDICINE

## 2024-02-15 PROCEDURE — 3075F SYST BP GE 130 - 139MM HG: CPT | Performed by: INTERNAL MEDICINE

## 2024-02-15 PROCEDURE — 1036F TOBACCO NON-USER: CPT | Performed by: INTERNAL MEDICINE

## 2024-02-15 PROCEDURE — 1157F ADVNC CARE PLAN IN RCRD: CPT | Performed by: INTERNAL MEDICINE

## 2024-02-15 ASSESSMENT — ENCOUNTER SYMPTOMS
FATIGUE: 0
FEVER: 0
DIARRHEA: 0
SHORTNESS OF BREATH: 0
CHILLS: 0
NAUSEA: 0
VOMITING: 0
COUGH: 0
HEADACHES: 0
PALPITATIONS: 0

## 2024-02-15 NOTE — PROGRESS NOTES
Endocrinology: Follow up visit  Subjective   Patient ID: Shona Montgomery is a 89 y.o. female who presents for Diabetes (Type 2 ), Hyperlipidemia, and Hypertension.    PCP: Elieser Marks MD    HPI  Since last visit had one cataract done then admitted for cp/bp elevated.  Around xmas hospitalized again for influenza a.   Still fatigued.  Sugars decent.   Hoping to have second cataract completed at the end of the month    Review of Systems   Constitutional:  Negative for chills, fatigue and fever.   Respiratory:  Negative for cough and shortness of breath.    Cardiovascular:  Negative for chest pain and palpitations.   Gastrointestinal:  Negative for diarrhea, nausea and vomiting.   Neurological:  Negative for headaches.       Patient Active Problem List   Diagnosis    NSTEMI (non-ST elevated myocardial infarction) (CMS/HCC)    Chronic atrial fibrillation (CMS/HCC)    Abnormal glucose    Acute renal failure (CMS/HCC)    Atherosclerotic heart disease of native coronary artery without angina pectoris    Renal/ureteral disease    Choledocholithiasis    Diabetes mellitus (CMS/HCC)    Dizziness    Edema    Hemangioma of skin and subcutaneous tissue    Hematuria    Hematuria, microscopic    Hyperkalemia    Hyperlipidemia    Hypertension    Lower extremity edema    Actinic keratosis    Other seborrheic keratosis    Skin changes due to chronic exposure to nonionizing radiation, unspecified    Personal history of other malignant neoplasm of skin    Polyuria    Primary osteoarthritis of first carpometacarpal joint of right hand    Psoriasis    Shortness of breath    Slac (scapholunate advanced collapse) of wrist, right    Tinea    Weakness    Adult onset hypothyroidism    Chest pain    Influenza A        Home Meds:  Current Outpatient Medications   Medication Instructions    amiodarone (PACERONE) 100 mg, oral, Daily    amLODIPine (NORVASC) 2.5 mg, oral, Daily    apixaban (ELIQUIS) 2.5 mg, oral, 2 times daily    atorvastatin  "(LIPITOR) 80 mg, oral, Nightly    clopidogrel (PLAVIX) 75 mg, oral, Daily    cyanocobalamin (VITAMIN B-12) 500 mcg, oral, Daily    docusate sodium (COLACE) 100 mg, oral, 2 times daily    ferrous sulfate 65 mg, oral, Every other day, Do not crush, chew, or split.    furosemide (LASIX) 20 mg, oral, Daily, On Mondays Wednesday and Friday    insulin degludec (TRESIBA FLEXTOUCH U-100) 5 Units, subcutaneous, Nightly    isosorbide mononitrate ER (IMDUR) 60 mg, oral, Daily    lancets (Accu-Chek Fastclix Lancet Drum) misc 1 Lancet, miscellaneous, 3 times daily    losartan (COZAAR) 50 mg, oral, 2 times daily    metoprolol succinate XL (Toprol-XL) 25 mg 24 hr tablet 1/2 tablet by mouth daily    nitroglycerin (NITROSTAT) 0.4 mg, sublingual, Every 5 min PRN    OneTouch Ultra Test strip 3 times daily        Allergies   Allergen Reactions    Tramadol Other     \"psychadelic Lights\"        Objective   Vitals:    02/15/24 1629   BP: 130/76   Pulse: 76   Resp: 16      Vitals:    02/15/24 1629   Weight: 50.6 kg (111 lb 9.6 oz)      Body mass index is 19.77 kg/m².   Physical Exam  Constitutional:       Appearance: Normal appearance. She is normal weight.   HENT:      Head: Normocephalic and atraumatic.   Neck:      Thyroid: No thyroid mass, thyromegaly or thyroid tenderness.   Cardiovascular:      Rate and Rhythm: Normal rate and regular rhythm.      Heart sounds: No murmur heard.     No gallop.   Pulmonary:      Effort: Pulmonary effort is normal.      Breath sounds: Normal breath sounds.   Abdominal:      Palpations: Abdomen is soft.      Comments: benign   Neurological:      General: No focal deficit present.      Mental Status: She is alert and oriented to person, place, and time.      Deep Tendon Reflexes: Reflexes are normal and symmetric.   Psychiatric:         Behavior: Behavior is cooperative.         Labs:  Lab Results   Component Value Date    HGBA1C 8.3 (A) 04/20/2023    TSH 4.65 (H) 04/20/2023    FREET4 1.22 04/20/2023    " "  No results found for: \"PR1\", \"THYROIDPAB\", \"TSI\"     Assessment/Plan   Problem List Items Addressed This Visit       Diabetes mellitus (CMS/[facility]) - Primary    Relevant Orders    Comprehensive Metabolic Panel    Hemoglobin A1C    TSH with reflex to Free T4 if abnormal    Hyperlipidemia    Hypertension    Adult onset hypothyroidism   Dm2:  Sugars are ok  A1c in near future  Htn  Bp excellent today  Lipids:  On statin  Hypothyroidism:  Mild, recheck due   Follow up in 6 months      Electronically signed by:  Martha Domingo MD 02/15/24 5:23 PM              "

## 2024-02-16 ENCOUNTER — HOSPITAL ENCOUNTER (OUTPATIENT)
Dept: RADIOLOGY | Facility: CLINIC | Age: 89
Discharge: HOME | End: 2024-02-16
Payer: MEDICARE

## 2024-02-16 ENCOUNTER — LAB (OUTPATIENT)
Dept: LAB | Facility: LAB | Age: 89
End: 2024-02-16
Payer: MEDICARE

## 2024-02-16 DIAGNOSIS — R10.84 GENERALIZED ABDOMINAL PAIN: ICD-10-CM

## 2024-02-16 DIAGNOSIS — R10.84 GENERALIZED ABDOMINAL PAIN: Primary | ICD-10-CM

## 2024-02-16 DIAGNOSIS — E11.9 TYPE 2 DIABETES MELLITUS WITHOUT COMPLICATION, WITH LONG-TERM CURRENT USE OF INSULIN (MULTI): ICD-10-CM

## 2024-02-16 DIAGNOSIS — Z79.4 TYPE 2 DIABETES MELLITUS WITHOUT COMPLICATION, WITH LONG-TERM CURRENT USE OF INSULIN (MULTI): ICD-10-CM

## 2024-02-16 LAB
ALBUMIN SERPL BCP-MCNC: 4.3 G/DL (ref 3.4–5)
ALP SERPL-CCNC: 89 U/L (ref 33–136)
ALT SERPL W P-5'-P-CCNC: 19 U/L (ref 7–45)
ANION GAP SERPL CALC-SCNC: 13 MMOL/L (ref 10–20)
AST SERPL W P-5'-P-CCNC: 19 U/L (ref 9–39)
BILIRUB DIRECT SERPL-MCNC: 0.1 MG/DL (ref 0–0.3)
BILIRUB SERPL-MCNC: 0.6 MG/DL (ref 0–1.2)
BUN SERPL-MCNC: 35 MG/DL (ref 6–23)
CALCIUM SERPL-MCNC: 10.3 MG/DL (ref 8.6–10.6)
CHLORIDE SERPL-SCNC: 101 MMOL/L (ref 98–107)
CO2 SERPL-SCNC: 28 MMOL/L (ref 21–32)
CREAT SERPL-MCNC: 1.48 MG/DL (ref 0.5–1.05)
EGFRCR SERPLBLD CKD-EPI 2021: 33 ML/MIN/1.73M*2
ERYTHROCYTE [DISTWIDTH] IN BLOOD BY AUTOMATED COUNT: 13.5 % (ref 11.5–14.5)
EST. AVERAGE GLUCOSE BLD GHB EST-MCNC: 186 MG/DL
GLUCOSE SERPL-MCNC: 159 MG/DL (ref 74–99)
HBA1C MFR BLD: 8.1 %
HCT VFR BLD AUTO: 35 % (ref 36–46)
HGB BLD-MCNC: 11.6 G/DL (ref 12–16)
MCH RBC QN AUTO: 31.4 PG (ref 26–34)
MCHC RBC AUTO-ENTMCNC: 33.1 G/DL (ref 32–36)
MCV RBC AUTO: 95 FL (ref 80–100)
NRBC BLD-RTO: 0 /100 WBCS (ref 0–0)
PLATELET # BLD AUTO: 229 X10*3/UL (ref 150–450)
POTASSIUM SERPL-SCNC: 4.3 MMOL/L (ref 3.5–5.3)
PROT SERPL-MCNC: 6.7 G/DL (ref 6.4–8.2)
RBC # BLD AUTO: 3.69 X10*6/UL (ref 4–5.2)
SODIUM SERPL-SCNC: 138 MMOL/L (ref 136–145)
T4 FREE SERPL-MCNC: 1.33 NG/DL (ref 0.78–1.48)
TSH SERPL-ACNC: 5.1 MIU/L (ref 0.44–3.98)
WBC # BLD AUTO: 5.6 X10*3/UL (ref 4.4–11.3)

## 2024-02-16 PROCEDURE — 85027 COMPLETE CBC AUTOMATED: CPT

## 2024-02-16 PROCEDURE — 82248 BILIRUBIN DIRECT: CPT

## 2024-02-16 PROCEDURE — 83036 HEMOGLOBIN GLYCOSYLATED A1C: CPT

## 2024-02-16 PROCEDURE — 87086 URINE CULTURE/COLONY COUNT: CPT

## 2024-02-16 PROCEDURE — 81001 URINALYSIS AUTO W/SCOPE: CPT

## 2024-02-16 PROCEDURE — 84439 ASSAY OF FREE THYROXINE: CPT

## 2024-02-16 PROCEDURE — 36415 COLL VENOUS BLD VENIPUNCTURE: CPT

## 2024-02-16 PROCEDURE — 84443 ASSAY THYROID STIM HORMONE: CPT

## 2024-02-16 PROCEDURE — 80053 COMPREHEN METABOLIC PANEL: CPT

## 2024-02-16 PROCEDURE — 74018 RADEX ABDOMEN 1 VIEW: CPT

## 2024-02-17 LAB
MUCOUS THREADS #/AREA URNS AUTO: ABNORMAL /LPF
RBC #/AREA URNS AUTO: >20 /HPF
SQUAMOUS #/AREA URNS AUTO: ABNORMAL /HPF
WBC #/AREA URNS AUTO: ABNORMAL /HPF

## 2024-02-18 LAB — BACTERIA UR CULT: NORMAL

## 2024-02-20 ENCOUNTER — TELEPHONE (OUTPATIENT)
Dept: CARDIOLOGY | Facility: CLINIC | Age: 89
End: 2024-02-20
Payer: MEDICARE

## 2024-04-24 DIAGNOSIS — I10 PRIMARY HYPERTENSION: ICD-10-CM

## 2024-04-24 RX ORDER — FUROSEMIDE 20 MG/1
20 TABLET ORAL 3 TIMES WEEKLY
Qty: 26 TABLET | Refills: 4 | Status: SHIPPED | OUTPATIENT
Start: 2024-04-24

## 2024-04-30 ENCOUNTER — OFFICE VISIT (OUTPATIENT)
Dept: CARDIOLOGY | Facility: HOSPITAL | Age: 89
End: 2024-04-30
Payer: MEDICARE

## 2024-04-30 VITALS
HEART RATE: 51 BPM | HEIGHT: 64 IN | SYSTOLIC BLOOD PRESSURE: 130 MMHG | WEIGHT: 109 LBS | DIASTOLIC BLOOD PRESSURE: 70 MMHG | BODY MASS INDEX: 18.61 KG/M2

## 2024-04-30 DIAGNOSIS — I48.20 CHRONIC ATRIAL FIBRILLATION (MULTI): Primary | ICD-10-CM

## 2024-04-30 PROCEDURE — 1160F RVW MEDS BY RX/DR IN RCRD: CPT | Performed by: INTERNAL MEDICINE

## 2024-04-30 PROCEDURE — 1157F ADVNC CARE PLAN IN RCRD: CPT | Performed by: INTERNAL MEDICINE

## 2024-04-30 PROCEDURE — 99214 OFFICE O/P EST MOD 30 MIN: CPT | Performed by: INTERNAL MEDICINE

## 2024-04-30 PROCEDURE — 3078F DIAST BP <80 MM HG: CPT | Performed by: INTERNAL MEDICINE

## 2024-04-30 PROCEDURE — 1159F MED LIST DOCD IN RCRD: CPT | Performed by: INTERNAL MEDICINE

## 2024-04-30 PROCEDURE — 3075F SYST BP GE 130 - 139MM HG: CPT | Performed by: INTERNAL MEDICINE

## 2024-04-30 PROCEDURE — 93005 ELECTROCARDIOGRAM TRACING: CPT | Performed by: INTERNAL MEDICINE

## 2024-04-30 PROCEDURE — 1036F TOBACCO NON-USER: CPT | Performed by: INTERNAL MEDICINE

## 2024-04-30 PROCEDURE — 93010 ELECTROCARDIOGRAM REPORT: CPT | Performed by: INTERNAL MEDICINE

## 2024-04-30 NOTE — PROGRESS NOTES
Subjective:  Shona returns for a routine follow-up.  I was pleased to see she got through cataract surgery uneventfully.  She is doing quite well and is happy with the results of this.  She has not had any hospitalizations and has fully recovered from her bad influenza A bout.  She denies any chest discomfort or dyspnea at a fair activity level.  She denies any palpitations to suggest recurrent atrial fibrillation.  She denies any bleeding problems despite good compliance with her low-dose apixaban.  She overall is generally quite happy and comfortable with how she is doing.    Objective:  General: Alert, delightful but frail elderly female.  HEENT: Unchanged.  Lungs: Clear with good air exchange.  Cardiac: Normal S1 and S2 with 2/6 systolic murmur.  Abdomen: Nontender.  Extremities: No edema.  Skin: No rash.  Neuro: Unchanged.    EKG: Sinus bradycardia.  Nonspecific ST and T wave abnormality.    Impression/plan:  Patient is doing quite well at this time from a cardiovascular standpoint.  She remains free of any angina at a fair activity level given her advanced age.  Her heart rate and blood pressure remain under very good control.    She is maintaining normal sinus rhythm with amiodarone and remains appropriately anticoagulated without any bleeding problems.    She remains on appropriate statin therapy.    Given patient's reasonably stable condition and her advanced age, I will not embark on any further testing at this time and I will not make any medication changes.  I will see her back for routine follow-up in 6 months.  She and her family know to call for any intercurrent concerns.      Patient instructions:    Continue current medications unchanged.    Return to clinic in 6 months.

## 2024-05-01 LAB
ATRIAL RATE: 51 BPM
P AXIS: 65 DEGREES
P OFFSET: 198 MS
P ONSET: 138 MS
PR INTERVAL: 160 MS
Q ONSET: 218 MS
QRS COUNT: 8 BEATS
QRS DURATION: 108 MS
QT INTERVAL: 466 MS
QTC CALCULATION(BAZETT): 429 MS
QTC FREDERICIA: 441 MS
R AXIS: 13 DEGREES
T AXIS: 110 DEGREES
T OFFSET: 451 MS
VENTRICULAR RATE: 51 BPM

## 2024-06-12 ENCOUNTER — APPOINTMENT (OUTPATIENT)
Dept: CARDIOLOGY | Facility: HOSPITAL | Age: 89
End: 2024-06-12
Payer: MEDICARE

## 2024-06-12 ENCOUNTER — APPOINTMENT (OUTPATIENT)
Dept: RADIOLOGY | Facility: HOSPITAL | Age: 89
End: 2024-06-12
Payer: MEDICARE

## 2024-06-12 ENCOUNTER — HOSPITAL ENCOUNTER (INPATIENT)
Facility: HOSPITAL | Age: 89
LOS: 1 days | Discharge: HOME | End: 2024-06-14
Attending: EMERGENCY MEDICINE | Admitting: INTERNAL MEDICINE
Payer: MEDICARE

## 2024-06-12 DIAGNOSIS — I50.9 ACUTE ON CHRONIC CONGESTIVE HEART FAILURE, UNSPECIFIED HEART FAILURE TYPE (MULTI): Primary | ICD-10-CM

## 2024-06-12 DIAGNOSIS — I10 PRIMARY HYPERTENSION: ICD-10-CM

## 2024-06-12 DIAGNOSIS — I50.23 ACUTE ON CHRONIC HFREF (HEART FAILURE WITH REDUCED EJECTION FRACTION) (MULTI): ICD-10-CM

## 2024-06-12 LAB
ALBUMIN SERPL BCP-MCNC: 3.8 G/DL (ref 3.4–5)
ALP SERPL-CCNC: 92 U/L (ref 33–136)
ALT SERPL W P-5'-P-CCNC: 17 U/L (ref 7–45)
ANION GAP SERPL CALC-SCNC: 12 MMOL/L (ref 10–20)
APTT PPP: 33 SECONDS (ref 27–38)
AST SERPL W P-5'-P-CCNC: 17 U/L (ref 9–39)
BASOPHILS # BLD AUTO: 0.03 X10*3/UL (ref 0–0.1)
BASOPHILS NFR BLD AUTO: 0.5 %
BILIRUB SERPL-MCNC: 0.4 MG/DL (ref 0–1.2)
BNP SERPL-MCNC: 378 PG/ML (ref 0–99)
BUN SERPL-MCNC: 30 MG/DL (ref 6–23)
CALCIUM SERPL-MCNC: 9.5 MG/DL (ref 8.6–10.3)
CARDIAC TROPONIN I PNL SERPL HS: 23 NG/L (ref 0–13)
CARDIAC TROPONIN I PNL SERPL HS: 25 NG/L (ref 0–13)
CHLORIDE SERPL-SCNC: 103 MMOL/L (ref 98–107)
CO2 SERPL-SCNC: 27 MMOL/L (ref 21–32)
CREAT SERPL-MCNC: 1.43 MG/DL (ref 0.5–1.05)
EGFRCR SERPLBLD CKD-EPI 2021: 34 ML/MIN/1.73M*2
EOSINOPHIL # BLD AUTO: 0.1 X10*3/UL (ref 0–0.4)
EOSINOPHIL NFR BLD AUTO: 1.6 %
ERYTHROCYTE [DISTWIDTH] IN BLOOD BY AUTOMATED COUNT: 13.3 % (ref 11.5–14.5)
GLUCOSE BLD MANUAL STRIP-MCNC: 183 MG/DL (ref 74–99)
GLUCOSE BLD MANUAL STRIP-MCNC: 191 MG/DL (ref 74–99)
GLUCOSE SERPL-MCNC: 194 MG/DL (ref 74–99)
HCT VFR BLD AUTO: 33.6 % (ref 36–46)
HGB BLD-MCNC: 11 G/DL (ref 12–16)
IMM GRANULOCYTES # BLD AUTO: 0.02 X10*3/UL (ref 0–0.5)
IMM GRANULOCYTES NFR BLD AUTO: 0.3 % (ref 0–0.9)
INR PPP: 1.1 (ref 0.9–1.1)
LYMPHOCYTES # BLD AUTO: 1.28 X10*3/UL (ref 0.8–3)
LYMPHOCYTES NFR BLD AUTO: 20.6 %
MAGNESIUM SERPL-MCNC: 1.9 MG/DL (ref 1.6–2.4)
MCH RBC QN AUTO: 31.3 PG (ref 26–34)
MCHC RBC AUTO-ENTMCNC: 32.7 G/DL (ref 32–36)
MCV RBC AUTO: 96 FL (ref 80–100)
MONOCYTES # BLD AUTO: 0.5 X10*3/UL (ref 0.05–0.8)
MONOCYTES NFR BLD AUTO: 8 %
NEUTROPHILS # BLD AUTO: 4.29 X10*3/UL (ref 1.6–5.5)
NEUTROPHILS NFR BLD AUTO: 69 %
NRBC BLD-RTO: 0 /100 WBCS (ref 0–0)
PLATELET # BLD AUTO: 203 X10*3/UL (ref 150–450)
POTASSIUM SERPL-SCNC: 3.7 MMOL/L (ref 3.5–5.3)
PROT SERPL-MCNC: 6.6 G/DL (ref 6.4–8.2)
PROTHROMBIN TIME: 12.9 SECONDS (ref 9.8–12.8)
RBC # BLD AUTO: 3.51 X10*6/UL (ref 4–5.2)
SODIUM SERPL-SCNC: 138 MMOL/L (ref 136–145)
WBC # BLD AUTO: 6.2 X10*3/UL (ref 4.4–11.3)

## 2024-06-12 PROCEDURE — 36415 COLL VENOUS BLD VENIPUNCTURE: CPT | Performed by: EMERGENCY MEDICINE

## 2024-06-12 PROCEDURE — 85610 PROTHROMBIN TIME: CPT | Performed by: EMERGENCY MEDICINE

## 2024-06-12 PROCEDURE — 80053 COMPREHEN METABOLIC PANEL: CPT | Performed by: EMERGENCY MEDICINE

## 2024-06-12 PROCEDURE — 99285 EMERGENCY DEPT VISIT HI MDM: CPT

## 2024-06-12 PROCEDURE — 93005 ELECTROCARDIOGRAM TRACING: CPT

## 2024-06-12 PROCEDURE — 83735 ASSAY OF MAGNESIUM: CPT | Performed by: EMERGENCY MEDICINE

## 2024-06-12 PROCEDURE — 83880 ASSAY OF NATRIURETIC PEPTIDE: CPT | Performed by: EMERGENCY MEDICINE

## 2024-06-12 PROCEDURE — 82947 ASSAY GLUCOSE BLOOD QUANT: CPT | Mod: 91

## 2024-06-12 PROCEDURE — 2500000002 HC RX 250 W HCPCS SELF ADMINISTERED DRUGS (ALT 637 FOR MEDICARE OP, ALT 636 FOR OP/ED): Performed by: EMERGENCY MEDICINE

## 2024-06-12 PROCEDURE — 85025 COMPLETE CBC W/AUTO DIFF WBC: CPT | Performed by: EMERGENCY MEDICINE

## 2024-06-12 PROCEDURE — 84484 ASSAY OF TROPONIN QUANT: CPT | Mod: 91 | Performed by: EMERGENCY MEDICINE

## 2024-06-12 PROCEDURE — 71045 X-RAY EXAM CHEST 1 VIEW: CPT | Performed by: RADIOLOGY

## 2024-06-12 PROCEDURE — 85730 THROMBOPLASTIN TIME PARTIAL: CPT | Performed by: EMERGENCY MEDICINE

## 2024-06-12 PROCEDURE — 71045 X-RAY EXAM CHEST 1 VIEW: CPT

## 2024-06-12 PROCEDURE — 84484 ASSAY OF TROPONIN QUANT: CPT | Performed by: EMERGENCY MEDICINE

## 2024-06-12 RX ORDER — INSULIN GLARGINE 100 [IU]/ML
5 INJECTION, SOLUTION SUBCUTANEOUS EVERY 24 HOURS
Status: DISCONTINUED | OUTPATIENT
Start: 2024-06-12 | End: 2024-06-14 | Stop reason: HOSPADM

## 2024-06-12 ASSESSMENT — COLUMBIA-SUICIDE SEVERITY RATING SCALE - C-SSRS
6. HAVE YOU EVER DONE ANYTHING, STARTED TO DO ANYTHING, OR PREPARED TO DO ANYTHING TO END YOUR LIFE?: NO
2. HAVE YOU ACTUALLY HAD ANY THOUGHTS OF KILLING YOURSELF?: NO
1. IN THE PAST MONTH, HAVE YOU WISHED YOU WERE DEAD OR WISHED YOU COULD GO TO SLEEP AND NOT WAKE UP?: NO

## 2024-06-12 ASSESSMENT — PAIN SCALES - GENERAL: PAINLEVEL_OUTOF10: 0 - NO PAIN

## 2024-06-12 ASSESSMENT — PAIN - FUNCTIONAL ASSESSMENT: PAIN_FUNCTIONAL_ASSESSMENT: 0-10

## 2024-06-13 PROBLEM — I50.9 ACUTE ON CHRONIC CONGESTIVE HEART FAILURE, UNSPECIFIED HEART FAILURE TYPE (MULTI): Status: ACTIVE | Noted: 2024-06-13

## 2024-06-13 PROBLEM — I50.23 ACUTE ON CHRONIC HFREF (HEART FAILURE WITH REDUCED EJECTION FRACTION) (MULTI): Status: ACTIVE | Noted: 2024-06-13

## 2024-06-13 LAB
ANION GAP SERPL CALC-SCNC: 11 MMOL/L (ref 10–20)
BUN SERPL-MCNC: 26 MG/DL (ref 6–23)
CALCIUM SERPL-MCNC: 9.4 MG/DL (ref 8.6–10.3)
CHLORIDE SERPL-SCNC: 106 MMOL/L (ref 98–107)
CO2 SERPL-SCNC: 26 MMOL/L (ref 21–32)
CREAT SERPL-MCNC: 1.33 MG/DL (ref 0.5–1.05)
EGFRCR SERPLBLD CKD-EPI 2021: 37 ML/MIN/1.73M*2
ERYTHROCYTE [DISTWIDTH] IN BLOOD BY AUTOMATED COUNT: 13.2 % (ref 11.5–14.5)
GLUCOSE BLD MANUAL STRIP-MCNC: 149 MG/DL (ref 74–99)
GLUCOSE BLD MANUAL STRIP-MCNC: 284 MG/DL (ref 74–99)
GLUCOSE BLD MANUAL STRIP-MCNC: 312 MG/DL (ref 74–99)
GLUCOSE BLD MANUAL STRIP-MCNC: 96 MG/DL (ref 74–99)
GLUCOSE SERPL-MCNC: 142 MG/DL (ref 74–99)
HCT VFR BLD AUTO: 32.5 % (ref 36–46)
HGB BLD-MCNC: 10.9 G/DL (ref 12–16)
MCH RBC QN AUTO: 31.4 PG (ref 26–34)
MCHC RBC AUTO-ENTMCNC: 33.5 G/DL (ref 32–36)
MCV RBC AUTO: 94 FL (ref 80–100)
NRBC BLD-RTO: 0 /100 WBCS (ref 0–0)
PLATELET # BLD AUTO: 196 X10*3/UL (ref 150–450)
POTASSIUM SERPL-SCNC: 3.7 MMOL/L (ref 3.5–5.3)
RBC # BLD AUTO: 3.47 X10*6/UL (ref 4–5.2)
SODIUM SERPL-SCNC: 139 MMOL/L (ref 136–145)
WBC # BLD AUTO: 5.2 X10*3/UL (ref 4.4–11.3)

## 2024-06-13 PROCEDURE — 82947 ASSAY GLUCOSE BLOOD QUANT: CPT | Mod: 91

## 2024-06-13 PROCEDURE — 2500000004 HC RX 250 GENERAL PHARMACY W/ HCPCS (ALT 636 FOR OP/ED): Performed by: NURSE PRACTITIONER

## 2024-06-13 PROCEDURE — 1200000002 HC GENERAL ROOM WITH TELEMETRY DAILY

## 2024-06-13 PROCEDURE — 2500000001 HC RX 250 WO HCPCS SELF ADMINISTERED DRUGS (ALT 637 FOR MEDICARE OP): Performed by: FAMILY MEDICINE

## 2024-06-13 PROCEDURE — 99222 1ST HOSP IP/OBS MODERATE 55: CPT

## 2024-06-13 PROCEDURE — 2500000001 HC RX 250 WO HCPCS SELF ADMINISTERED DRUGS (ALT 637 FOR MEDICARE OP): Performed by: NURSE PRACTITIONER

## 2024-06-13 PROCEDURE — 2500000002 HC RX 250 W HCPCS SELF ADMINISTERED DRUGS (ALT 637 FOR MEDICARE OP, ALT 636 FOR OP/ED): Performed by: NURSE PRACTITIONER

## 2024-06-13 PROCEDURE — 80048 BASIC METABOLIC PNL TOTAL CA: CPT | Performed by: NURSE PRACTITIONER

## 2024-06-13 PROCEDURE — 36415 COLL VENOUS BLD VENIPUNCTURE: CPT | Performed by: NURSE PRACTITIONER

## 2024-06-13 PROCEDURE — 85027 COMPLETE CBC AUTOMATED: CPT | Performed by: NURSE PRACTITIONER

## 2024-06-13 PROCEDURE — 2500000001 HC RX 250 WO HCPCS SELF ADMINISTERED DRUGS (ALT 637 FOR MEDICARE OP)

## 2024-06-13 RX ORDER — POLYETHYLENE GLYCOL 3350 17 G/17G
17 POWDER, FOR SOLUTION ORAL DAILY
Status: DISCONTINUED | OUTPATIENT
Start: 2024-06-13 | End: 2024-06-14 | Stop reason: HOSPADM

## 2024-06-13 RX ORDER — DOCUSATE SODIUM 100 MG/1
100 CAPSULE, LIQUID FILLED ORAL 2 TIMES DAILY
Status: DISCONTINUED | OUTPATIENT
Start: 2024-06-13 | End: 2024-06-14 | Stop reason: HOSPADM

## 2024-06-13 RX ORDER — ACETAMINOPHEN 160 MG/5ML
650 SOLUTION ORAL EVERY 4 HOURS PRN
Status: DISCONTINUED | OUTPATIENT
Start: 2024-06-13 | End: 2024-06-14 | Stop reason: HOSPADM

## 2024-06-13 RX ORDER — ATORVASTATIN CALCIUM 80 MG/1
80 TABLET, FILM COATED ORAL NIGHTLY
Status: DISCONTINUED | OUTPATIENT
Start: 2024-06-13 | End: 2024-06-14 | Stop reason: HOSPADM

## 2024-06-13 RX ORDER — AMLODIPINE BESYLATE 2.5 MG/1
2.5 TABLET ORAL ONCE
Status: COMPLETED | OUTPATIENT
Start: 2024-06-13 | End: 2024-06-13

## 2024-06-13 RX ORDER — DEXTROSE 50 % IN WATER (D50W) INTRAVENOUS SYRINGE
12.5
Status: DISCONTINUED | OUTPATIENT
Start: 2024-06-13 | End: 2024-06-14 | Stop reason: HOSPADM

## 2024-06-13 RX ORDER — ONDANSETRON HYDROCHLORIDE 2 MG/ML
4 INJECTION, SOLUTION INTRAVENOUS EVERY 8 HOURS PRN
Status: DISCONTINUED | OUTPATIENT
Start: 2024-06-13 | End: 2024-06-14 | Stop reason: HOSPADM

## 2024-06-13 RX ORDER — INSULIN LISPRO 100 [IU]/ML
0-5 INJECTION, SOLUTION INTRAVENOUS; SUBCUTANEOUS
Status: DISCONTINUED | OUTPATIENT
Start: 2024-06-13 | End: 2024-06-13

## 2024-06-13 RX ORDER — METOPROLOL SUCCINATE 25 MG/1
12.5 TABLET, EXTENDED RELEASE ORAL DAILY
Status: DISCONTINUED | OUTPATIENT
Start: 2024-06-13 | End: 2024-06-13

## 2024-06-13 RX ORDER — ONDANSETRON 4 MG/1
4 TABLET, FILM COATED ORAL EVERY 8 HOURS PRN
Status: DISCONTINUED | OUTPATIENT
Start: 2024-06-13 | End: 2024-06-14 | Stop reason: HOSPADM

## 2024-06-13 RX ORDER — AMLODIPINE BESYLATE 5 MG/1
5 TABLET ORAL DAILY
Status: DISCONTINUED | OUTPATIENT
Start: 2024-06-14 | End: 2024-06-14

## 2024-06-13 RX ORDER — METOPROLOL SUCCINATE 25 MG/1
12.5 TABLET, EXTENDED RELEASE ORAL NIGHTLY
Status: DISCONTINUED | OUTPATIENT
Start: 2024-06-13 | End: 2024-06-14 | Stop reason: HOSPADM

## 2024-06-13 RX ORDER — ACETAMINOPHEN 650 MG/1
650 SUPPOSITORY RECTAL EVERY 4 HOURS PRN
Status: DISCONTINUED | OUTPATIENT
Start: 2024-06-13 | End: 2024-06-14 | Stop reason: HOSPADM

## 2024-06-13 RX ORDER — AMIODARONE HYDROCHLORIDE 200 MG/1
100 TABLET ORAL DAILY
Status: DISCONTINUED | OUTPATIENT
Start: 2024-06-13 | End: 2024-06-14 | Stop reason: HOSPADM

## 2024-06-13 RX ORDER — FUROSEMIDE 10 MG/ML
20 INJECTION INTRAMUSCULAR; INTRAVENOUS ONCE
Status: COMPLETED | OUTPATIENT
Start: 2024-06-13 | End: 2024-06-13

## 2024-06-13 RX ORDER — INSULIN LISPRO 100 [IU]/ML
0-10 INJECTION, SOLUTION INTRAVENOUS; SUBCUTANEOUS
Status: DISCONTINUED | OUTPATIENT
Start: 2024-06-13 | End: 2024-06-13

## 2024-06-13 RX ORDER — POTASSIUM CHLORIDE 750 MG/1
10 TABLET, FILM COATED, EXTENDED RELEASE ORAL DAILY
Status: DISCONTINUED | OUTPATIENT
Start: 2024-06-13 | End: 2024-06-14 | Stop reason: HOSPADM

## 2024-06-13 RX ORDER — NITROGLYCERIN 0.4 MG/1
0.4 TABLET SUBLINGUAL EVERY 5 MIN PRN
Status: DISCONTINUED | OUTPATIENT
Start: 2024-06-13 | End: 2024-06-14 | Stop reason: HOSPADM

## 2024-06-13 RX ORDER — INSULIN LISPRO 100 [IU]/ML
0-5 INJECTION, SOLUTION INTRAVENOUS; SUBCUTANEOUS
Status: DISCONTINUED | OUTPATIENT
Start: 2024-06-13 | End: 2024-06-14 | Stop reason: HOSPADM

## 2024-06-13 RX ORDER — LOSARTAN POTASSIUM 50 MG/1
50 TABLET ORAL 2 TIMES DAILY
Status: DISCONTINUED | OUTPATIENT
Start: 2024-06-13 | End: 2024-06-14 | Stop reason: HOSPADM

## 2024-06-13 RX ORDER — FUROSEMIDE 10 MG/ML
20 INJECTION INTRAMUSCULAR; INTRAVENOUS EVERY 12 HOURS
Status: DISCONTINUED | OUTPATIENT
Start: 2024-06-13 | End: 2024-06-14 | Stop reason: HOSPADM

## 2024-06-13 RX ORDER — ISOSORBIDE MONONITRATE 30 MG/1
60 TABLET, EXTENDED RELEASE ORAL DAILY
Status: DISCONTINUED | OUTPATIENT
Start: 2024-06-13 | End: 2024-06-14 | Stop reason: HOSPADM

## 2024-06-13 RX ORDER — DEXTROSE 50 % IN WATER (D50W) INTRAVENOUS SYRINGE
25
Status: DISCONTINUED | OUTPATIENT
Start: 2024-06-13 | End: 2024-06-14 | Stop reason: HOSPADM

## 2024-06-13 RX ORDER — PANTOPRAZOLE SODIUM 40 MG/1
40 TABLET, DELAYED RELEASE ORAL
Status: DISCONTINUED | OUTPATIENT
Start: 2024-06-13 | End: 2024-06-14 | Stop reason: HOSPADM

## 2024-06-13 RX ORDER — AMLODIPINE BESYLATE 5 MG/1
2.5 TABLET ORAL DAILY
Status: DISCONTINUED | OUTPATIENT
Start: 2024-06-13 | End: 2024-06-14 | Stop reason: HOSPADM

## 2024-06-13 RX ORDER — ACETAMINOPHEN 325 MG/1
650 TABLET ORAL EVERY 4 HOURS PRN
Status: DISCONTINUED | OUTPATIENT
Start: 2024-06-13 | End: 2024-06-14 | Stop reason: HOSPADM

## 2024-06-13 RX ORDER — PANTOPRAZOLE SODIUM 40 MG/10ML
40 INJECTION, POWDER, LYOPHILIZED, FOR SOLUTION INTRAVENOUS
Status: DISCONTINUED | OUTPATIENT
Start: 2024-06-13 | End: 2024-06-14 | Stop reason: HOSPADM

## 2024-06-13 RX ORDER — CLOPIDOGREL BISULFATE 75 MG/1
75 TABLET ORAL DAILY
Status: DISCONTINUED | OUTPATIENT
Start: 2024-06-13 | End: 2024-06-14 | Stop reason: HOSPADM

## 2024-06-13 SDOH — SOCIAL STABILITY: SOCIAL INSECURITY: DO YOU FEEL UNSAFE GOING BACK TO THE PLACE WHERE YOU ARE LIVING?: NO

## 2024-06-13 SDOH — SOCIAL STABILITY: SOCIAL INSECURITY: HAVE YOU HAD ANY THOUGHTS OF HARMING ANYONE ELSE?: NO

## 2024-06-13 SDOH — SOCIAL STABILITY: SOCIAL INSECURITY: DOES ANYONE TRY TO KEEP YOU FROM HAVING/CONTACTING OTHER FRIENDS OR DOING THINGS OUTSIDE YOUR HOME?: NO

## 2024-06-13 SDOH — SOCIAL STABILITY: SOCIAL INSECURITY: HAS ANYONE EVER THREATENED TO HURT YOUR FAMILY OR YOUR PETS?: NO

## 2024-06-13 SDOH — SOCIAL STABILITY: SOCIAL INSECURITY: WERE YOU ABLE TO COMPLETE ALL THE BEHAVIORAL HEALTH SCREENINGS?: YES

## 2024-06-13 SDOH — SOCIAL STABILITY: SOCIAL INSECURITY: DO YOU FEEL ANYONE HAS EXPLOITED OR TAKEN ADVANTAGE OF YOU FINANCIALLY OR OF YOUR PERSONAL PROPERTY?: NO

## 2024-06-13 SDOH — SOCIAL STABILITY: SOCIAL INSECURITY: ARE THERE ANY APPARENT SIGNS OF INJURIES/BEHAVIORS THAT COULD BE RELATED TO ABUSE/NEGLECT?: NO

## 2024-06-13 SDOH — SOCIAL STABILITY: SOCIAL INSECURITY: HAVE YOU HAD THOUGHTS OF HARMING ANYONE ELSE?: YES

## 2024-06-13 SDOH — SOCIAL STABILITY: SOCIAL INSECURITY: ABUSE: ADULT

## 2024-06-13 SDOH — SOCIAL STABILITY: SOCIAL INSECURITY: ARE YOU OR HAVE YOU BEEN THREATENED OR ABUSED PHYSICALLY, EMOTIONALLY, OR SEXUALLY BY ANYONE?: NO

## 2024-06-13 ASSESSMENT — COGNITIVE AND FUNCTIONAL STATUS - GENERAL
DAILY ACTIVITIY SCORE: 24
PATIENT BASELINE BEDBOUND: NO
DAILY ACTIVITIY SCORE: 24
MOBILITY SCORE: 24
MOBILITY SCORE: 24

## 2024-06-13 ASSESSMENT — PAIN - FUNCTIONAL ASSESSMENT
PAIN_FUNCTIONAL_ASSESSMENT: 0-10
PAIN_FUNCTIONAL_ASSESSMENT: 0-10

## 2024-06-13 ASSESSMENT — LIFESTYLE VARIABLES
HOW OFTEN DO YOU HAVE A DRINK CONTAINING ALCOHOL: NEVER
AUDIT-C TOTAL SCORE: 0
SKIP TO QUESTIONS 9-10: 1
HOW OFTEN DO YOU HAVE 6 OR MORE DRINKS ON ONE OCCASION: NEVER
HOW MANY STANDARD DRINKS CONTAINING ALCOHOL DO YOU HAVE ON A TYPICAL DAY: PATIENT DOES NOT DRINK
AUDIT-C TOTAL SCORE: 0

## 2024-06-13 ASSESSMENT — ACTIVITIES OF DAILY LIVING (ADL)
DRESSING YOURSELF: INDEPENDENT
LACK_OF_TRANSPORTATION: NO
PATIENT'S MEMORY ADEQUATE TO SAFELY COMPLETE DAILY ACTIVITIES?: NO
TOILETING: INDEPENDENT
GROOMING: INDEPENDENT
HEARING - RIGHT EAR: FUNCTIONAL
LACK_OF_TRANSPORTATION: NO
BATHING: INDEPENDENT
JUDGMENT_ADEQUATE_SAFELY_COMPLETE_DAILY_ACTIVITIES: NO
LACK_OF_TRANSPORTATION: NO
ADEQUATE_TO_COMPLETE_ADL: NO
HEARING - LEFT EAR: FUNCTIONAL
FEEDING YOURSELF: INDEPENDENT
WALKS IN HOME: INDEPENDENT

## 2024-06-13 ASSESSMENT — PAIN SCALES - GENERAL
PAINLEVEL_OUTOF10: 0 - NO PAIN

## 2024-06-13 ASSESSMENT — PATIENT HEALTH QUESTIONNAIRE - PHQ9
1. LITTLE INTEREST OR PLEASURE IN DOING THINGS: NOT AT ALL
SUM OF ALL RESPONSES TO PHQ9 QUESTIONS 1 & 2: 0
2. FEELING DOWN, DEPRESSED OR HOPELESS: NOT AT ALL

## 2024-06-13 NOTE — ED PROVIDER NOTES
HPI   Chief Complaint   Patient presents with    Leg Swelling     bilateral       Patient presents with leg edema for the last 4 days.  She also an episode of back pain which is actually her presenting sign for the last 4 heart attacks.  Last heart attack was 2 years ago.  She states that they did see an EKG.  She denies any active pain or back pain or chest pain at this time.  She denies any shortness of breath.  Denies any fever, cough, vomiting.  She has had poor appetite for the last several days.  Patient takes her 20 mg of Lasix Monday Wednesday Friday and did take her dose this morning.  She continues to have swelling of her legs.                          Brandon Coma Scale Score: 15                     Patient History   Past Medical History:   Diagnosis Date    Atherosclerotic heart disease of native coronary artery without angina pectoris 04/20/2022    Atherosclerotic heart disease of native coronary artery without angina pectoris    Personal history of other diseases of the circulatory system 09/16/2013    History of hypertension     Past Surgical History:   Procedure Laterality Date    CORONARY ARTERY BYPASS GRAFT  08/08/2016    CABG    TOTAL HIP ARTHROPLASTY  08/28/2014    Total Hip Replacement     Family History   Problem Relation Name Age of Onset    No Known Problems Mother      No Known Problems Father       Social History     Tobacco Use    Smoking status: Never    Smokeless tobacco: Never   Substance Use Topics    Alcohol use: Not on file    Drug use: Not on file       Physical Exam   ED Triage Vitals [06/12/24 1957]   Temperature Heart Rate Respirations BP   36.4 °C (97.5 °F) 58 18 (!) 194/66      Pulse Ox Temp Source Heart Rate Source Patient Position   99 % Temporal Monitor --      BP Location FiO2 (%)     -- --       Physical Exam  Vitals and nursing note reviewed.   Constitutional:       General: She is not in acute distress.     Appearance: She is well-developed.   HENT:      Head:  Normocephalic and atraumatic.   Eyes:      Conjunctiva/sclera: Conjunctivae normal.   Cardiovascular:      Rate and Rhythm: Normal rate and regular rhythm.      Heart sounds: No murmur heard.  Pulmonary:      Effort: Pulmonary effort is normal. No respiratory distress.      Breath sounds: Normal breath sounds.   Abdominal:      Palpations: Abdomen is soft.      Tenderness: There is no abdominal tenderness.   Musculoskeletal:         General: No swelling.      Cervical back: Neck supple.   Skin:     General: Skin is warm and dry.      Capillary Refill: Capillary refill takes less than 2 seconds.   Neurological:      Mental Status: She is alert.   Psychiatric:         Mood and Affect: Mood normal.         ED Course & MDM   Diagnoses as of 06/13/24 0004   Acute on chronic congestive heart failure, unspecified heart failure type (Multi)       Medical Decision Making  The patient had not quite taken her evening doses before arrival.  Will give her her evening doses here she has them with her and we did allow her to take her home meds.  Patient takes both Eliquis and Plavix.  Patient's BNP and troponins are improved from previous.  Patient is a negative troponin has no hypoxia on ambulation feel she could be additional diuresis.  Not feel that she has DVT with bilateral leg edema and history of CHF.  Would have her take 1 week of Lasix every day and follow-up with her PCP/cardiologist.    Family was upset with the patient's high blood pressures and wants her to be admitted for observation.    EKG interpreted by myself.  Normal sinus rhythm at a rate of 59 bpm.  Normal intervals.  Normal axis.  No signs of acute ischemia.      Repeat EKG interpreted by myself.  Bradycardic sinus rhythm at a rate of 54 bpm.  Normal intervals.  Normal axis.  No signs of acute ischemia.    Procedure  Procedures     Aleksandar Borrero MD  06/13/24 0004

## 2024-06-13 NOTE — PROGRESS NOTES
"Shona Montgomery is a 89 y.o. female on day 0 of admission presenting with Acute on chronic HFrEF (heart failure with reduced ejection fraction) (Multi).    Subjective   Awake in bed, daughter at bedside. Denies an complaints or concerns at this time        Objective     Physical Exam  Constitutional:       Appearance: Normal appearance.   Cardiovascular:      Rate and Rhythm: Normal rate and regular rhythm.      Pulses: Normal pulses.      Heart sounds: Normal heart sounds. No murmur heard.     No gallop.   Pulmonary:      Effort: Pulmonary effort is normal. No respiratory distress.      Breath sounds: Normal breath sounds. No wheezing or rhonchi.   Abdominal:      General: Abdomen is flat. Bowel sounds are normal. There is no distension.      Palpations: Abdomen is soft.      Tenderness: There is no abdominal tenderness. There is no guarding.   Musculoskeletal:      Right lower le+ Edema present.      Left lower le+ Edema present.   Skin:     General: Skin is warm.      Capillary Refill: Capillary refill takes less than 2 seconds.   Neurological:      Mental Status: She is alert and oriented to person, place, and time.   Psychiatric:         Mood and Affect: Mood normal.         Thought Content: Thought content normal.         Judgment: Judgment normal.       Last Recorded Vitals  Blood pressure 150/62, pulse 51, temperature 36.6 °C (97.9 °F), temperature source Tympanic, resp. rate 18, height 1.575 m (5' 2\"), weight 50.8 kg (112 lb), SpO2 95%.  Intake/Output last 3 Shifts:  No intake/output data recorded.    Relevant Results  Scheduled medications  insulin glargine, 5 Units, subcutaneous, q24h  insulin lispro, 0-5 Units, subcutaneous, TID      Continuous medications     PRN medications  PRN medications: dextrose, dextrose, glucagon, glucagon    Results for orders placed or performed during the hospital encounter of 24 (from the past 24 hour(s))   CBC and Auto Differential   Result Value Ref Range    WBC " 6.2 4.4 - 11.3 x10*3/uL    nRBC 0.0 0.0 - 0.0 /100 WBCs    RBC 3.51 (L) 4.00 - 5.20 x10*6/uL    Hemoglobin 11.0 (L) 12.0 - 16.0 g/dL    Hematocrit 33.6 (L) 36.0 - 46.0 %    MCV 96 80 - 100 fL    MCH 31.3 26.0 - 34.0 pg    MCHC 32.7 32.0 - 36.0 g/dL    RDW 13.3 11.5 - 14.5 %    Platelets 203 150 - 450 x10*3/uL    Neutrophils % 69.0 40.0 - 80.0 %    Immature Granulocytes %, Automated 0.3 0.0 - 0.9 %    Lymphocytes % 20.6 13.0 - 44.0 %    Monocytes % 8.0 2.0 - 10.0 %    Eosinophils % 1.6 0.0 - 6.0 %    Basophils % 0.5 0.0 - 2.0 %    Neutrophils Absolute 4.29 1.60 - 5.50 x10*3/uL    Immature Granulocytes Absolute, Automated 0.02 0.00 - 0.50 x10*3/uL    Lymphocytes Absolute 1.28 0.80 - 3.00 x10*3/uL    Monocytes Absolute 0.50 0.05 - 0.80 x10*3/uL    Eosinophils Absolute 0.10 0.00 - 0.40 x10*3/uL    Basophils Absolute 0.03 0.00 - 0.10 x10*3/uL   Comprehensive Metabolic Panel   Result Value Ref Range    Glucose 194 (H) 74 - 99 mg/dL    Sodium 138 136 - 145 mmol/L    Potassium 3.7 3.5 - 5.3 mmol/L    Chloride 103 98 - 107 mmol/L    Bicarbonate 27 21 - 32 mmol/L    Anion Gap 12 10 - 20 mmol/L    Urea Nitrogen 30 (H) 6 - 23 mg/dL    Creatinine 1.43 (H) 0.50 - 1.05 mg/dL    eGFR 34 (L) >60 mL/min/1.73m*2    Calcium 9.5 8.6 - 10.3 mg/dL    Albumin 3.8 3.4 - 5.0 g/dL    Alkaline Phosphatase 92 33 - 136 U/L    Total Protein 6.6 6.4 - 8.2 g/dL    AST 17 9 - 39 U/L    Bilirubin, Total 0.4 0.0 - 1.2 mg/dL    ALT 17 7 - 45 U/L   Magnesium   Result Value Ref Range    Magnesium 1.90 1.60 - 2.40 mg/dL   aPTT   Result Value Ref Range    aPTT 33 27 - 38 seconds   Protime-INR   Result Value Ref Range    Protime 12.9 (H) 9.8 - 12.8 seconds    INR 1.1 0.9 - 1.1   B-Type Natriuretic Peptide   Result Value Ref Range     (H) 0 - 99 pg/mL   Troponin I, High Sensitivity, Initial   Result Value Ref Range    Troponin I, High Sensitivity 23 (H) 0 - 13 ng/L   POCT GLUCOSE   Result Value Ref Range    POCT Glucose 183 (H) 74 - 99 mg/dL   POCT  GLUCOSE   Result Value Ref Range    POCT Glucose 191 (H) 74 - 99 mg/dL   Troponin, High Sensitivity, 1 Hour   Result Value Ref Range    Troponin I, High Sensitivity 25 (H) 0 - 13 ng/L     XR chest 1 view    Result Date: 6/12/2024  Interpreted By:  Torsten Akers, STUDY: XR CHEST 1 VIEW;; 6/12/2024 9:19 pm   INDICATION: Signs/Symptoms:Chest Pain.   COMPARISON: 12/23/2023   ACCESSION NUMBER(S): [id number]   ORDERING CLINICIAN: VERONICA KNUTSON   FINDINGS: Pulmonary hyperinflation similar to prior concerning for COPD. The cardiac silhouette is stable for the technique. Tortuous aorta with wall calcification but similar to prior. Coarse interstitial markings throughout the lung fields bilaterally similar to prior. No consolidations, effusions, infiltrates or pneumothorax.       No evidence for acute cardiopulmonary process. No significant interval change since prior. If there is clinical concern for acute aortic pathology or pulmonary embolic disease, further evaluation with chest CT should be considered.       Signed by: Torsten Akers 6/12/2024 9:48 PM Dictation workstation:   [id number]              Assessment/Plan   Principal Problem:    Acute on chronic HFrEF (heart failure with reduced ejection fraction) (Multi)    Shona Montgomery is a 89 y.o. female with past medical history significant for HTN, HLD, Afib on eliquis, CAD s/p CABG, HFrEF/NICM, mild-moderate MR, DM type II, CKD, anemia. Presented to [facility] ED with complaints of  bilateral lower extremity edema for the past few days. Denies any LE pain. Denies any shortness of breath, chest pain, cough, LH/dizziness, palpitations. Her daughter checks her blood pressure at home today and it was in the 180s over 90s today. She takes her lasix MWF, has not missed any doses. Weight went from 108 lb late last week up to 112 to 113lb over the past few days. Denies any dietary indiscretions. Has not missed any doses of her eliquis. Per ED report she was c/o of back pain which  sometimes happens with her prior MI, she tells me this was a couple of days ago and has resolved. ED COURSE: /66, HR 58, otherwise VSS, on room air. Labs notable for blood glucose of 194, Cr. 1.43/Bun 30 which appears her baseline, , trop 23->25, h/h 11.0/33.6. CXR with no acute findings. EKG SB HR 58, no acute ischemic changes.      Acute on chronic HFrEF, NICM   -start IV diuresis  -tele  -strict I/Os, DW, monitor renal function/lytes closely  -cardiology consulted, appreciate recs-> continue with IV diuresis   -   -CXR neg      Elevated troponin, flat trend. Suspect type II demand ischemia   EKG no ischemic changes  Denies any c/o chest pain  -monitor on tele     CAD, s/p CABG  -continue plavix/statin     pAF  -continue eliquis/amio/metop, monitor on tele      HTN  Elevated blood pressure on admission   -resume home regimen and monitor      HLD  -statin      CKD  -Stable, monitor   -creat 1.43 on admit, baseline  -avoid nephrotoxic agents       DM type II  -Accu-checks achs, ssi w/meals, hypoglycemia protocol     DVT/GI prophylaxis  -eliquis  -PPI    DC plan:  DC home when medically stable    Labs/Testing reviewed    Interdisciplinary team rounding completed with hospitalist, nurse, TCC    NP discussed plan and lab/testing results with Dr. Birmingham     1520 BP 84/49, endorsing being symptomatic. Reports this started after additional 20 mg IV lasix (40 total for this AM). Next dose of lasix held. BP meds held. 250 bolus ordered. Danial mason also ordered      Upgraded from obs to inpatient, Dr. Marks  to assume care, notified via epic secure chat     I spent 45 minutes in the professional and overall care of this patient.      Mariella Clark, APRN-CNP

## 2024-06-13 NOTE — PROGRESS NOTES
06/13/24 0645   [facility] Disability Status   Are you deaf or do you have serious difficulty hearing? N   Are you blind or do you have serious difficulty seeing, even when wearing glasses? Y   Because of a physical, mental, or emotional condition, do you have serious difficulty concentrating, remembering, or making decisions? (5 years old or older) N   Do you have serious difficulty walking or climbing stairs? Y   Do you have serious difficulty dressing or bathing? N   Because of a physical, mental, or emotional condition, do you have serious difficulty doing errands alone such as visiting the doctor? Y

## 2024-06-13 NOTE — PROGRESS NOTES
Home alone     06/13/24 0645   Current Planned Discharge Disposition   Current Planned Discharge Disposition Home

## 2024-06-13 NOTE — PROGRESS NOTES
Transitional Care Coordination Progress Note:  Plan per Medical/Surgical team: treatment of CHF with IV lasix & cardio consult   Status: Observation  Payor source: medicare A/B  Discharge disposition: Home alone   Potential Barriers: renal 30/1.43, trop 23, 25  ADOD: 6/14/2024   KAMALJIT Benoit RN, BSN Transitional Care Coordinator ED# [phone]      06/13/24 0645   Discharge Planning   Living Arrangements Alone   Support Systems Children   Assistance Needed cardio work up   Type of Residence Private residence   Number of Stairs to Enter Residence 3   Number of Stairs Within Residence 12  (first floor living)   Do you have animals or pets at home? No   Home or Post Acute Services None   Patient expects to be discharged to: Home alone   Does the patient need discharge transport arranged? No   Financial Resource Strain   How hard is it for you to pay for the very basics like food, housing, medical care, and heating? Not hard   Housing Stability   In the last 12 months, was there a time when you were not able to pay the mortgage or rent on time? N   In the last 12 months, how many places have you lived? 1   In the last 12 months, was there a time when you did not have a steady place to sleep or slept in a shelter (including now)? N   Transportation Needs   In the past 12 months, has lack of transportation kept you from medical appointments or from getting medications? no   In the past 12 months, has lack of transportation kept you from meetings, work, or from getting things needed for daily living? No

## 2024-06-13 NOTE — H&P
History Of Present Illness  Shona Montgomery is a 89 y.o. female with past medical history significant for HTN, HLD, Afib on eliquis, CAD s/p CABG, HFrEF/NICM, mild-moderate MR, DM type II, CKD, anemia. Presented to [facility] ED with complaints of  bilateral lower extremity edema for the past few days. Denies any LE pain. Denies any shortness of breath, chest pain, cough, LH/dizziness, palpitations. Her daughter checks her blood pressure at home today and it was in the 180s over 90s today. She takes her lasix MWF, has not missed any doses. Weight went from 108 lb late last week up to 112 to 113lb over the past few days. Denies any dietary indiscretions. Has not missed any doses of her eliquis. Per ED report she was c/o of back pain which sometimes happens with her prior MI, she tells me this was a couple of days ago and has resolved.     ED COURSE: /66, HR 58, otherwise VSS, on room air. Labs notable for blood glucose of 194, Cr. 1.43/Bun 30 which appears her baseline, , trop 23->25, h/h 11.0/33.6. CXR with no acute findings. EKG SB HR 58, no acute ischemic changes.      Past Medical History  As above     Surgical History  She has a past surgical history that includes Coronary artery bypass graft (08/08/2016) and Total hip arthroplasty (08/28/2014).     Social History  She reports that she has never smoked. She has never used smokeless tobacco. No history on file for alcohol use and drug use.    Family History  Family History   Problem Relation Name Age of Onset    No Known Problems Mother      No Known Problems Father          Allergies  Tramadol    Review of Systems   Cardiovascular:  Positive for leg swelling.        Physical Exam   Constitutional: NAD  Eyes: no icterus   ENMT: mucous membranes moist  Head/Neck: supple. Mild JVD  Resp/thorax: CTA bilat, no cough, on RA  C/V: RRR, no murmurs  : no Rooney  GI: S/ND/NT, + BS  M/S: no joint swelling  Extremities: + 2 BLE edema   Neurological: non-focal  Skin:  Warm and dry    Last Recorded Vitals  /62   Pulse 50   Temp 36.4 °C (97.5 °F) (Temporal)   Resp 18   Wt 50.8 kg (112 lb)   SpO2 97%     Relevant Results  Results for orders placed or performed during the hospital encounter of 06/12/24 (from the past 24 hour(s))   CBC and Auto Differential   Result Value Ref Range    WBC 6.2 4.4 - 11.3 x10*3/uL    nRBC 0.0 0.0 - 0.0 /100 WBCs    RBC 3.51 (L) 4.00 - 5.20 x10*6/uL    Hemoglobin 11.0 (L) 12.0 - 16.0 g/dL    Hematocrit 33.6 (L) 36.0 - 46.0 %    MCV 96 80 - 100 fL    MCH 31.3 26.0 - 34.0 pg    MCHC 32.7 32.0 - 36.0 g/dL    RDW 13.3 11.5 - 14.5 %    Platelets 203 150 - 450 x10*3/uL    Neutrophils % 69.0 40.0 - 80.0 %    Immature Granulocytes %, Automated 0.3 0.0 - 0.9 %    Lymphocytes % 20.6 13.0 - 44.0 %    Monocytes % 8.0 2.0 - 10.0 %    Eosinophils % 1.6 0.0 - 6.0 %    Basophils % 0.5 0.0 - 2.0 %    Neutrophils Absolute 4.29 1.60 - 5.50 x10*3/uL    Immature Granulocytes Absolute, Automated 0.02 0.00 - 0.50 x10*3/uL    Lymphocytes Absolute 1.28 0.80 - 3.00 x10*3/uL    Monocytes Absolute 0.50 0.05 - 0.80 x10*3/uL    Eosinophils Absolute 0.10 0.00 - 0.40 x10*3/uL    Basophils Absolute 0.03 0.00 - 0.10 x10*3/uL   Comprehensive Metabolic Panel   Result Value Ref Range    Glucose 194 (H) 74 - 99 mg/dL    Sodium 138 136 - 145 mmol/L    Potassium 3.7 3.5 - 5.3 mmol/L    Chloride 103 98 - 107 mmol/L    Bicarbonate 27 21 - 32 mmol/L    Anion Gap 12 10 - 20 mmol/L    Urea Nitrogen 30 (H) 6 - 23 mg/dL    Creatinine 1.43 (H) 0.50 - 1.05 mg/dL    eGFR 34 (L) >60 mL/min/1.73m*2    Calcium 9.5 8.6 - 10.3 mg/dL    Albumin 3.8 3.4 - 5.0 g/dL    Alkaline Phosphatase 92 33 - 136 U/L    Total Protein 6.6 6.4 - 8.2 g/dL    AST 17 9 - 39 U/L    Bilirubin, Total 0.4 0.0 - 1.2 mg/dL    ALT 17 7 - 45 U/L   Magnesium   Result Value Ref Range    Magnesium 1.90 1.60 - 2.40 mg/dL   aPTT   Result Value Ref Range    aPTT 33 27 - 38 seconds   Protime-INR   Result Value Ref Range    Protime  12.9 (H) 9.8 - 12.8 seconds    INR 1.1 0.9 - 1.1   B-Type Natriuretic Peptide   Result Value Ref Range     (H) 0 - 99 pg/mL   Troponin I, High Sensitivity, Initial   Result Value Ref Range    Troponin I, High Sensitivity 23 (H) 0 - 13 ng/L   POCT GLUCOSE   Result Value Ref Range    POCT Glucose 183 (H) 74 - 99 mg/dL   POCT GLUCOSE   Result Value Ref Range    POCT Glucose 191 (H) 74 - 99 mg/dL   Troponin, High Sensitivity, 1 Hour   Result Value Ref Range    Troponin I, High Sensitivity 25 (H) 0 - 13 ng/L        Assessment/Plan   Shona Montgomery is a 89 y.o. female with past medical history significant for HTN, HLD, Afib on eliquis, CAD s/p CABG, HFrEF/NICM, mild-moderate MR, DM type II, CKD, anemia. Presented to [facility] ED with complaints of  bilateral lower extremity edema for the past few days. Denies any LE pain. Denies any shortness of breath, chest pain, cough, LH/dizziness, palpitations. Her daughter checks her blood pressure at home today and it was in the 180s over 90s today. She takes her lasix MWF, has not missed any doses. Weight went from 108 lb late last week up to 112 to 113lb over the past few days. Denies any dietary indiscretions. Has not missed any doses of her eliquis. Per ED report she was c/o of back pain which sometimes happens with her prior MI, she tells me this was a couple of days ago and has resolved. ED COURSE: /66, HR 58, otherwise VSS, on room air. Labs notable for blood glucose of 194, Cr. 1.43/Bun 30 which appears her baseline, , trop 23->25, h/h 11.0/33.6. CXR with no acute findings. EKG SB HR 58, no acute ischemic changes.      Acute on chronic HFrEF, NICM   -start IV diuresis  -tele  -strict I/Os, DW, monitor renal function/lytes closely  -cardiology consulted    Elevated troponin, flat trend. Suspect type II demand ischemia   EKG no ischemic changes  Denies any c/o chest pain  -monitor on tele    CAD, s/p CABG  -continue plavix/statin    pAF  -continue  eliquis/amio/metop, monitor on tele     HTN  Elevated blood pressure on admission   -resume home regimen and monitor     HLD  -statin     CKD  Stable, monitor     DM type II  Accu-checks achs, ssi w/meals, hypoglycemia protocol    DVT/GI prophylaxis  -eliquis  -PPI    Discharge planning    * 55 minutes total spent on patient's care today; >50% time spent on counseling/coordination of care    Amber Figueroa, MAGALI-CNP

## 2024-06-13 NOTE — CARE PLAN
Problem: Pain - Adult  Goal: Verbalizes/displays adequate comfort level or baseline comfort level  Outcome: Progressing     Problem: Safety - Adult  Goal: Free from fall injury  Outcome: Progressing     Problem: Discharge Planning  Goal: Discharge to home or other facility with appropriate resources  Outcome: Progressing     Problem: Chronic Conditions and Co-morbidities  Goal: Patient's chronic conditions and co-morbidity symptoms are monitored and maintained or improved  Outcome: Progressing     Problem: Diabetes  Goal: Achieve decreasing blood glucose levels by end of shift  Outcome: Progressing  Goal: Increase stability of blood glucose readings by end of shift  Outcome: Progressing  Goal: Decrease in ketones present in urine by end of shift  Outcome: Progressing  Goal: Maintain electrolyte levels within acceptable range throughout shift  Outcome: Progressing  Goal: Maintain glucose levels >70mg/dl to <250mg/dl throughout shift  Outcome: Progressing  Goal: No changes in neurological exam by end of shift  Outcome: Progressing  Goal: Learn about and adhere to nutrition recommendations by end of shift  Outcome: Progressing  Goal: Vital signs within normal range for age by end of shift  Outcome: Progressing  Goal: Increase self care and/or family involovement by end of shift  Outcome: Progressing  Goal: Receive DSME education by end of shift  Outcome: Progressing     Problem: Heart Failure  Goal: Improved gas exchange this shift  Outcome: Progressing  Goal: Improved urinary output this shift  Outcome: Progressing  Goal: Reduction in peripheral edema within 24 hours  Outcome: Progressing  Goal: Report improvement of dyspnea/breathlessness this shift  Outcome: Progressing  Goal: Weight from fluid excess reduced over 2-3 days, then stabilize  Outcome: Progressing  Goal: Increase self care and/or family involvement in 24 hours  Outcome: Progressing

## 2024-06-13 NOTE — PROGRESS NOTES
Pharmacy Medication History Review    Shona Montgomery is a 89 y.o. female admitted for Acute on chronic HFrEF (heart failure with reduced ejection fraction) (Multi). Pharmacy reviewed the patient's [id number] medications and allergies for accuracy.    Below are additional concerns with the patient's PTA list.  Collected from patient.     The list below reflectives the updated PTA list. Please review each medication in order reconciliation for additional clarification and justification.    Prior to Admission Medications   Prescriptions Last Dose Informant   OneTouch Ultra Test strip     Sig: 3 times a day.   amLODIPine (Norvasc) 2.5 mg tablet 2024    Sig: Take 1 tablet (2.5 mg) by mouth once daily.   amiodarone (Pacerone) 100 mg tablet     Sig: Take 1 tablet (100 mg) by mouth once daily.   apixaban (Eliquis) 2.5 mg tablet     Sig: Take 1 tablet (2.5 mg) by mouth 2 times a day.   atorvastatin (Lipitor) 80 mg tablet     Sig: Take 1 tablet (80 mg) by mouth once daily at bedtime.   clopidogrel (Plavix) 75 mg tablet     Sig: Take 1 tablet (75 mg) by mouth once daily.   cyanocobalamin (Vitamin B-12) 500 mcg tablet     Sig: Take 1 tablet (500 mcg) by mouth once daily.   docusate sodium (Colace) 100 mg capsule     Sig: Take 1 capsule (100 mg) by mouth 2 times a day.   ferrous sulfate 325 (65 Fe) MG EC tablet     Sig: Take 65 mg by mouth every other day. Do not crush, chew, or split.   furosemide (Lasix) 20 mg tablet     Sig: TAKE 1 TABLET BY MOUTH DAILY ON MONDAY, WEDNESDAY AND FRIDAY   insulin degludec (Tresiba FlexTouch U-100) 100 unit/mL (3 mL) injection     Sig: Inject 5 Units under the skin once daily at bedtime.   isosorbide mononitrate ER (Imdur) 60 mg 24 hr tablet     Sig: Take 1 tablet (60 mg) by mouth once daily.   lancets (Accu-Chek Fastclix Lancet Drum) misc     Si Lancet 3 times a day.   losartan (Cozaar) 100 mg tablet     Sig: Take 0.5 tablets (50 mg) by mouth 2 times a day.   metoprolol succinate  XL (Toprol-XL) 25 mg 24 hr tablet     Si/2 tablet by mouth daily   Patient taking differently: Take 0.5 tablets (12.5 mg) by mouth once daily at bedtime. 1/2 tablet by mouth daily   nitroglycerin (Nitrostat) 0.4 mg SL tablet     Sig: Place 1 tablet (0.4 mg) under the tongue every 5 minutes if needed for chest pain.      Facility-Administered Medications: None            Carmenza Flores, PharmD

## 2024-06-13 NOTE — CONSULTS
"Inpatient consult to Cardiology  Consult performed by: MAGALI Ford-CNP  Consult ordered by: MAGALI Stallworth-CNP  Reason for consult: chf        History Of Present Illness:    Shona Montgomery is a 89 y.o. female with past medical history of hypertension, hyperlipidemia, CKD stage III,  p A-fib on Eliquis, NICM (EF 35-40%), MR mild to mod, CAD status post CABG 2016 and prior non-ST elevation myocardial infarction (repeat cath 7 years ago showed a patent sequential LIMA with an occluded SVG-RCA) , chronic systolic heart failure, type 2 diabetes [place] [facility] [place].  Cardiology consulted for CHF.     States she came to the ED with \"skyrocketing BP. \" She has not been feeling well the past few days, and her daughter took her BP and it was 188/80.  She checks her BP once a week at home, usually with SBP running 120-140.  She has noticed her legs swelling for the past week which is very unusual for her. She has been taking furosemide 20mg every MWF for years. She never has chest pain.  Denies any shortness of breath , no orthopnea.  Denies any dizziness or syncope.  No palpitations.  She lies flat to sleep.  Appetite is good.     Follows with Dr. Mendiola for cardiology, last visit 4/30/2024    Past Cardiology Tests (Last 3 Years):  EKG:  Results for orders placed during the hospital encounter of 06/12/24    ECG 12 lead (Preliminary)  This result has not been signed. Information might be incomplete.    Narrative  Sinus bradycardia  Nonspecific intraventricular conduction delay  Nonspecific T wave abnormality  Abnormal ECG  When compared with ECG of 30-APR-2024 14:51,  T wave inversion no longer evident in Lateral leads      ECG 12 lead (Preliminary)  This result has not been signed. Information might be incomplete.    Narrative  Sinus bradycardia  Minimal voltage criteria for LVH, may be normal variant ( Compa product )  ST & T wave abnormality, consider " lateral ischemia  Abnormal ECG  When compared with ECG of 12-JUN-2024 20:12, (unconfirmed)  Questionable change in QRS axis      Results for orders placed in visit on 04/30/24    ECG 12 lead (Clinic Performed)    Narrative  Sinus bradycardia  ST & T wave abnormality, consider lateral ischemia  Abnormal ECG  When compared with ECG of 09-JAN-2024 15:10,  No significant change was found  Confirmed by Mohsen Mendiola ([id number]) on 5/1/2024 8:33:53 AM      Results for orders placed in visit on 01/09/24    ECG 12 lead (Clinic Performed)    Narrative  Sinus bradycardia  Nonspecific ST and T wave abnormality  Abnormal ECG  Confirmed by Mohsen Mendiola [id number]) on 1/10/2024 8:06:26 AM      Results for orders placed during the hospital encounter of 12/22/23    Electrocardiogram, 12-lead PRN ACS symptoms    Narrative  Normal sinus rhythm  Nonspecific ST and T wave abnormality  Abnormal ECG  When compared with ECG of 22-DEC-2023 11:41, (unconfirmed)  No significant change was found  See ED provider note for full interpretation and clinical correlation  Confirmed by Denisha Dillard ([id number]) on 12/27/2023 10:51:14 AM      ECG 12 lead    Narrative  Normal sinus rhythm  ST & T wave abnormality, consider lateral ischemia  Abnormal ECG  Confirmed by Mohsen Mendiola ([id number]) on 12/27/2023 9:57:45 AM      Results for orders placed in visit on 12/18/23    ECG 12 lead (Clinic Performed)    Narrative  Sinus bradycardia  Possible Left atrial enlargement  T wave abnormality, consider lateral ischemia  Abnormal ECG  Confirmed by Mohsen Mendiola ([id number]) on 12/18/2023 5:37:58 PM      Results for orders placed in visit on 10/24/23    ECG 12 lead (Clinic Performed)    Narrative  Sinus bradycardia  Incomplete left bundle branch block  Nonspecific T wave abnormality  Abnormal ECG  Confirmed by Mohsen Mendiola ([id number]) on 10/24/2023 7:17:54 PM    Echo:  TTE 4/2022  1. The left ventricular systolic function is moderately decreased with a  35-40% estimated ejection fraction.  2. Spectral Doppler shows an impaired relaxation pattern of left ventricular diastolic filling.  3. The left atrium is severely dilated.  4. Moderate mitral valve regurgitation.  5. Mildly elevated RVSP.    TTE 4/2021  CONCLUSIONS:   1. The left ventricular systolic function is mildly decreased with a 45-50% estimated ejection fraction.   2. Basal and mid inferior wall and basal and mid inferolateral wall are abnormal.   3. Spectral Doppler shows an impaired relaxation pattern of left ventricular diastolic filling.   4. There is an elevated mean left atrial pressure.   5. There are multiple wall motion abnormalities    TTE 3/2021  CONCLUSIONS:   1. The left ventricular systolic function is normal with a 45-50% estimated ejection fraction.   2. Abnormal septal motion consistent with post-operative status.   3. Spectral Doppler shows an impaired relaxation pattern of left ventricular diastolic filling.   4. There is an elevated mean left atrial pressure.   5. There is inferobasal and inferolateral hypo- to akinesia.    TTE 8/2019  CONCLUSIONS:   1. The left ventricular systolic function is mildly decreased with a 45% estimated ejection fraction.   2. Mid inferior segment, basal and mid inferolateral wall, and basal inferoseptal segment are abnormal.   3. Abnormal septal motion consistent with post-operative status.   4. Spectral Doppler shows an impaired relaxation pattern of left ventricular diastolic filling.   5. There is an elevated mean left atrial pressure.   6. The left atrium is moderately dilated.   7. There are multiple wall motion abnormalities.    Cath:    CAD status post CABG  and prior non-ST elevation myocardial infarction (repeat cath 7 years ago showed a patent sequential LIMA with an occluded SVG-RCA     Stress Test:   Nuclear stress 2017  Summary:   1. No clinical or electrocardiographic evidence for ischemia at a maximal infusion.   2. Nuclear image results are  reported separately.    Cardiac Imaging:  No results found for this or any previous visit.      Past Medical History:  She has a past medical history of Atherosclerotic heart disease of native coronary artery without angina pectoris (2022) and Personal history of other diseases of the circulatory system (2013).    Past Surgical History:  She has a past surgical history that includes Coronary artery bypass graft (2016) and Total hip arthroplasty (2014).      Social History:  She reports that she has never smoked. She has never used smokeless tobacco. No history on file for alcohol use and drug use.    Family History:  Family History   Problem Relation Name Age of Onset    No Known Problems Mother      No Known Problems Father          Allergies:  Tramadol    ROS:  10 point review of systems including (Constitutional, Eyes, ENMT, Respiratory, Cardiac, Gastrointestinal, Neurological, Psychiatric, and Hematologic) was performed and is otherwise negative.    Objective Data:  Last Recorded Vitals:  Vitals:    24 0000 24 0030 24 0444 24 0734   BP: 139/61 162/77 150/62 (!) 181/93   BP Location:   Right arm    Patient Position:   Lying    Pulse: 51 (!) 48 51 63   Resp: 20 19 18 18   Temp:   36.6 °C (97.9 °F) 36.3 °C (97.3 °F)   TempSrc:   Tympanic Temporal   SpO2: 97% 97% 95% 97%   Weight:       Height:         Medical Gas Therapy: None (Room air)  Weight  Av.8 kg (112 lb)  Min: 50.8 kg (112 lb)  Max: 50.8 kg (112 lb)    LABS:  CMP:  Results from last 7 days   Lab Units 24  0703 24  2105   SODIUM mmol/L 139 138   POTASSIUM mmol/L 3.7 3.7   CHLORIDE mmol/L 106 103   CO2 mmol/L 26 27   ANION GAP mmol/L 11 12   BUN mg/dL 26* 30*   CREATININE mg/dL 1.33* 1.43*   EGFR mL/min/1.73m*2 37* 34*   MAGNESIUM mg/dL  --  1.90   ALBUMIN g/dL  --  3.8   ALT U/L  --  17   AST U/L  --  17   BILIRUBIN TOTAL mg/dL  --  0.4     CBC:  Results from last 7 days   Lab Units  "06/13/24  0703 06/12/24 2105   WBC AUTO x10*3/uL 5.2 6.2   HEMOGLOBIN g/dL 10.9* 11.0*   HEMATOCRIT % 32.5* 33.6*   PLATELETS AUTO x10*3/uL 196 203   MCV fL 94 96     COAG:   Results from last 7 days   Lab Units 06/12/24 2105   INR  1.1     ABO: No results found for: \"ABO\"  HEME/ENDO:     CARDIAC:   Results from last 7 days   Lab Units 06/12/24 2235 06/12/24 2105   TROPHS ng/L 25* 23*   BNP pg/mL  --  378*             Last I/O:  No intake or output data in the 24 hours ending 06/13/24 0818  Net IO Since Admission: No IO data has been entered for this period [06/13/24 0818]      Imaging Results:  ECG 12 lead    Result Date: 6/13/2024  Sinus bradycardia Minimal voltage criteria for LVH, may be normal variant ( Compa product ) ST & T wave abnormality, consider lateral ischemia Abnormal ECG When compared with ECG of 12-JUN-2024 20:12, (unconfirmed) Questionable change in QRS axis    ECG 12 lead    Result Date: 6/13/2024  Sinus bradycardia Nonspecific intraventricular conduction delay Nonspecific T wave abnormality Abnormal ECG When compared with ECG of 30-APR-2024 14:51, T wave inversion no longer evident in Lateral leads    XR chest 1 view    Result Date: 6/12/2024  Interpreted By:  Torsten Akers, STUDY: XR CHEST 1 VIEW;; 6/12/2024 9:19 pm   INDICATION: Signs/Symptoms:Chest Pain.   COMPARISON: 12/23/2023   ACCESSION NUMBER(S): [id number]   ORDERING CLINICIAN: VERONICA KNUTSON   FINDINGS: Pulmonary hyperinflation similar to prior concerning for COPD. The cardiac silhouette is stable for the technique. Tortuous aorta with wall calcification but similar to prior. Coarse interstitial markings throughout the lung fields bilaterally similar to prior. No consolidations, effusions, infiltrates or pneumothorax.       No evidence for acute cardiopulmonary process. No significant interval change since prior. If there is clinical concern for acute aortic pathology or pulmonary embolic disease, further evaluation with chest " CT should be considered.       Signed by: Torsten Akers 6/12/2024 9:48 PM Dictation workstation:   [id number]      Inpatient Medications:  Scheduled medications   Medication Dose Route Frequency    amiodarone  100 mg oral Daily    amLODIPine  2.5 mg oral Daily    apixaban  2.5 mg oral BID    atorvastatin  80 mg oral Nightly    clopidogrel  75 mg oral Daily    furosemide  20 mg intravenous q12h    insulin glargine  5 Units subcutaneous q24h    insulin lispro  0-5 Units subcutaneous TID    isosorbide mononitrate ER  60 mg oral Daily    losartan  50 mg oral BID    metoprolol succinate XL  12.5 mg oral Daily    pantoprazole  40 mg oral Daily before breakfast    Or    pantoprazole  40 mg intravenous Daily before breakfast    polyethylene glycol  17 g oral Daily     PRN medications   Medication    acetaminophen    Or    acetaminophen    Or    acetaminophen    dextrose    dextrose    glucagon    glucagon    nitroglycerin    ondansetron    Or    ondansetron     Continuous Medications   Medication Dose Last Rate       Outpatient Medications:  Current Outpatient Medications   Medication Instructions    amiodarone (PACERONE) 100 mg, oral, Daily    amLODIPine (NORVASC) 2.5 mg, oral, Daily    apixaban (ELIQUIS) 2.5 mg, oral, 2 times daily    atorvastatin (LIPITOR) 80 mg, oral, Nightly    clopidogrel (PLAVIX) 75 mg, oral, Daily    cyanocobalamin (VITAMIN B-12) 500 mcg, oral, Daily    docusate sodium (COLACE) 100 mg, oral, 2 times daily    ferrous sulfate 65 mg, oral, Every other day, Do not crush, chew, or split.    furosemide (LASIX) 20 mg, oral, 3 times weekly, Take on Monday, Wednesday, and Friday    insulin degludec (TRESIBA FLEXTOUCH U-100) 5 Units, subcutaneous, Nightly    isosorbide mononitrate ER (IMDUR) 60 mg, oral, Daily    lancets (Accu-Chek Fastclix Lancet Drum) misc 1 Lancet, miscellaneous, 3 times daily    losartan (COZAAR) 50 mg, oral, 2 times daily    metoprolol succinate XL (Toprol-XL) 25 mg 24 hr tablet 1/2  tablet by mouth daily    nitroglycerin (NITROSTAT) 0.4 mg, sublingual, Every 5 min PRN    OneTouch Ultra Test strip 3 times daily       Physical Exam:    General:  Patient is awake, alert, and oriented.  Patient is in no acute distress.  HEENT:  Normocephalic.  Moist mucosa.    Neck:  Normal Jugular Venous Pressure.  Cardiovascular:  Regular rate and rhythm.  Normal S1 and S2, no murmurs, rubs or gallops  Pulmonary:  Clear to auscultation bilaterally, diminished in bases.   Abdomen:  Soft. Non-tender.   Non-distended.  Positive bowel sounds.  Lower Extremities:  2+ pedal pulses. Mild 1+ LE edema  Neurologic:  Alert and oriented x3. No focal deficit.   Skin: Skin warm and dry, normal skin turgor.   Psychiatric: Normal affect.       Assessment/Plan     Shona Montgomery is a 89 y.o. female with past medical history of hypertension, hyperlipidemia, CKD stage III,  p A-fib on Eliquis, NICM (EF 35-40%), MR mild to mod, CAD status post CABG 2016 and prior non-ST elevation myocardial infarction (repeat cath 7 years ago showed a patent sequential LIMA with an occluded SVG-RCA) , chronic systolic heart failure, type 2 [place] [facility] [place].  Cardiology consulted for CHF.     Assessment:  # Acute on chronic systolic heart failure, ICM    - dry weight is 108lbs.  Weight has crept up to 112lbs the past few weeks.    # CAD s/p CABG 2016  # pAF on low dose Eliquis    - maintaining SB/ SR on amiodarone 100mg daily  # Hypertension, uncontrolled    - 's upon admit.    # Hypokalemia, replaced   # DM2, last A1C 8.1 in 2/2024  # Mildly elevated troponin    - flat trend in patient with chronic low level leak.  Low level elevation with a flat trend consistent with a non-MI troponin elevation / chronic non-traumatic myocardial injury in the setting of above. Core measures do not apply.    6/13 >  ( previous 649)),  HS troponin 23, 25. Hypertensive in ED  with /66, HR 58.    EKG showing NSR.  BUN/ cr 90/1.43.  CXR clear.     Plan:  - Increase amlodipine to 5mg daily.  Dosed with 2.5mg this morning, will order additional 2.5mg now x 1.  - Diurese with furosemide 40mg IV daily.  Dosed with furosemide 20mg IV this morning, will order an additional dose of 20mg IV x 1 now.  Daily weights > try to get down to dry weight of < 110lbs.  Will likely need to increase home furosemide of 20mg MWF to daily dosing.   - With increased diuresis, adding potassium 10meq daily  - Continue home amiodarone 100mg daily, Toprol 12.5mg daily, and apixaban 2.5mg BID  - Continue clopidogrel 75mg daily, and atorvastatin 80mg nightly  - Continue Imdur 60mg daily, and losartan 50mg daily   - No need to repeat echo at this time, can consider once BP controlled, but likely repeat as outpatient.    Follow up with established cardiologist.      Code Status:  Full Code    MAGALI Ford-UDAY    ==========================  Attending note  ==========================  Both the AYALA and I have had a face to face encounter with the patient today. I have examined the patient and edited the documented physical examination as necessary.  I personally reviewed the patient's recent labs, medications, orders, EKGs, and pertinent cardiac imaging.  I have reviewed the AYALA's encounter note, approve the AYALA's documentation and have edited the note to reflect the diagnostic and therapeutic plan.    Shona Montgomery is a 89-year-old female with a history of hypertension, hyperlipidemia, CKD stage III, paroxysmal atrial fibrillation on Eliquis, NICM (EF 35-40%), mild to moderate mitral regurgitation, CAD status post-CABG in 2016, prior NSTEMI with a patent sequential LIMA and occluded SVG-RCA from a cath 7 years ago, chronic systolic heart failure, and type 2 diabetes. She presents with hypertension and bilateral leg swelling. She reported to the ED with a blood pressure of 188/80, significantly higher than her usual 120-140 SBP, and  noticed unusual leg swelling over the past week. She has been taking furosemide 20mg every MWF for years without previous issues. She denies chest pain, shortness of breath, orthopnea, dizziness, syncope, or palpitations, and sleeps flat with a good appetite. She follows with Dr. Mendiola for cardiology, with the last visit on 4/30/2024.    Past medical history:  As above.    Medications were reviewed.    Allergies were reviewed.    Vital signs, telemetry, medications, labs, and imaging were reviewed as well.    Physical Exam:  General:  Patient is awake, alert, and oriented.  Patient is in no acute distress.  HEENT:  Normocephalic.  Moist mucosa.    Neck:  Normal Jugular Venous Pressure.  Cardiovascular:  Regular rate and rhythm.  Normal S1 and S2, no murmurs, rubs or gallops  Pulmonary:  Clear to auscultation bilaterally, diminished in bases.   Abdomen:  Soft. Non-tender.   Non-distended.  Positive bowel sounds.  Lower Extremities:  2+ pedal pulses. Mild 1+ LE edema  Neurologic:  Alert and oriented x3. No focal deficit.   Skin: Skin warm and dry, normal skin turgor.   Psychiatric: Normal affect.      Assessment/Plan   Shona Montgomery is a 89-year-old female with a history of hypertension, hyperlipidemia, CKD stage III, paroxysmal atrial fibrillation on Eliquis, non-ischemic cardiomyopathy (EF 35-40%), mild to moderate mitral regurgitation, CAD status post-CABG in 2016, prior NSTEMI with a patent sequential LIMA and occluded SVG-RCA, chronic systolic heart failure, and type 2 diabetes, presenting with uncontrolled hypertension and bilateral leg swelling. Cardiology was consulted for CHF management.     Assessment includes acute on chronic systolic heart failure with an increase in weight to 112 lbs from a dry weight of 108 lbs, CAD post-CABG, pAF maintained on amiodarone 100 mg daily, uncontrolled hypertension with SBP in the 180s, hypokalemia (replaced), and type 2 diabetes with a last A1C of 8.1. Troponin levels  showed a flat trend consistent with non-MI troponin elevation. Recent labs showed , HS troponin 23, 25, /66, HR 58, NSR on EKG, and clear CXR.   Plan includes increasing amlodipine to 5 mg daily, diuresing with furosemide 40 mg IV daily with an additional 20 mg IV now, monitoring daily weights to achieve a dry weight of <110 lbs, and likely increasing home furosemide to daily dosing. Potassium 10 meq daily will be added due to increased diuresis. Continue current medications: amiodarone 100 mg daily, Toprol 12.5 mg daily, apixaban 2.5 mg BID, clopidogrel 75 mg daily, atorvastatin 80 mg nightly, Imdur 60 mg daily, and losartan 50 mg daily.   No need to repeat the echocardiogram at this time, but it can be considered once BP is controlled, likely as an outpatient. Follow-up with the established cardiologist is recommended.    Assessment:  # Acute on chronic systolic heart failure, ICM    - dry weight is 108lbs.  Weight has crept up to 112lbs the past few weeks.    # CAD s/p CABG 2016  # pAF on low dose Eliquis    - maintaining SB/ SR on amiodarone 100mg daily  # Hypertension, uncontrolled    - 's upon admit.    # Hypokalemia, replaced   # DM2, last A1C 8.1 in 2/2024  # Mildly elevated troponin    - flat trend in patient with chronic low level leak.  Low level elevation with a flat trend consistent with a non-MI troponin elevation / chronic non-traumatic myocardial injury in the setting of above. Core measures do not apply.    6/13 >  ( previous 649)),  HS troponin 23, 25. Hypertensive in ED  with /66, HR 58.   EKG showing NSR.  BUN/ cr 90/1.43.  CXR clear.     Plan:  - Increase amlodipine to 5mg daily.  Dosed with 2.5mg this morning, will order additional 2.5mg now x 1.  - Diurese with furosemide 40mg IV daily.  Dosed with furosemide 20mg IV this morning, will order an additional dose of 20mg IV x 1 now.  Daily weights > try to get down to dry weight of < 110lbs.  Will likely need to  increase home furosemide of 20mg MWF to daily dosing.   - With increased diuresis, adding potassium 10meq daily  - Continue home amiodarone 100mg daily, Toprol 12.5mg daily, and apixaban 2.5mg BID  - Continue clopidogrel 75mg daily, and atorvastatin 80mg nightly  - Continue Imdur 60mg daily, and losartan 50mg daily   - No need to repeat echo at this time, can consider once BP controlled, but likely repeat as outpatient.    Follow up with established cardiologist.      Jalen Camara MD

## 2024-06-13 NOTE — NURSING NOTE
Pt transferred from OBS. Oriented to room, floor, call light. Fluid bolus infusing. Pt denies pain at this time. Alert and oriented. Redness, blanchable, noted to sacrum and buttocks. Pt declined foam dressing, educated pt on relieving pressure by turning every 2 hours. Pt verbalized understanding. Daughter at bedside.

## 2024-06-14 ENCOUNTER — APPOINTMENT (OUTPATIENT)
Dept: CARDIOLOGY | Facility: HOSPITAL | Age: 89
End: 2024-06-14
Payer: MEDICARE

## 2024-06-14 VITALS
WEIGHT: 109.3 LBS | SYSTOLIC BLOOD PRESSURE: 152 MMHG | RESPIRATION RATE: 16 BRPM | HEIGHT: 62 IN | TEMPERATURE: 98.1 F | DIASTOLIC BLOOD PRESSURE: 82 MMHG | BODY MASS INDEX: 20.11 KG/M2 | HEART RATE: 53 BPM | OXYGEN SATURATION: 97 %

## 2024-06-14 LAB
ANION GAP SERPL CALC-SCNC: 12 MMOL/L (ref 10–20)
BUN SERPL-MCNC: 38 MG/DL (ref 6–23)
CALCIUM SERPL-MCNC: 9.5 MG/DL (ref 8.6–10.3)
CHLORIDE SERPL-SCNC: 103 MMOL/L (ref 98–107)
CO2 SERPL-SCNC: 26 MMOL/L (ref 21–32)
CREAT SERPL-MCNC: 1.75 MG/DL (ref 0.5–1.05)
EGFRCR SERPLBLD CKD-EPI 2021: 27 ML/MIN/1.73M*2
ERYTHROCYTE [DISTWIDTH] IN BLOOD BY AUTOMATED COUNT: 13.3 % (ref 11.5–14.5)
GLUCOSE BLD MANUAL STRIP-MCNC: 148 MG/DL (ref 74–99)
GLUCOSE BLD MANUAL STRIP-MCNC: 368 MG/DL (ref 74–99)
GLUCOSE SERPL-MCNC: 191 MG/DL (ref 74–99)
HCT VFR BLD AUTO: 33.5 % (ref 36–46)
HGB BLD-MCNC: 10.9 G/DL (ref 12–16)
MAGNESIUM SERPL-MCNC: 1.9 MG/DL (ref 1.6–2.4)
MCH RBC QN AUTO: 30.9 PG (ref 26–34)
MCHC RBC AUTO-ENTMCNC: 32.5 G/DL (ref 32–36)
MCV RBC AUTO: 95 FL (ref 80–100)
NRBC BLD-RTO: 0 /100 WBCS (ref 0–0)
PLATELET # BLD AUTO: 193 X10*3/UL (ref 150–450)
POTASSIUM SERPL-SCNC: 3.8 MMOL/L (ref 3.5–5.3)
RBC # BLD AUTO: 3.53 X10*6/UL (ref 4–5.2)
SODIUM SERPL-SCNC: 137 MMOL/L (ref 136–145)
WBC # BLD AUTO: 5.2 X10*3/UL (ref 4.4–11.3)

## 2024-06-14 PROCEDURE — 83735 ASSAY OF MAGNESIUM: CPT | Performed by: NURSE PRACTITIONER

## 2024-06-14 PROCEDURE — 93005 ELECTROCARDIOGRAM TRACING: CPT

## 2024-06-14 PROCEDURE — 99232 SBSQ HOSP IP/OBS MODERATE 35: CPT

## 2024-06-14 PROCEDURE — 82947 ASSAY GLUCOSE BLOOD QUANT: CPT | Mod: 91

## 2024-06-14 PROCEDURE — 2500000001 HC RX 250 WO HCPCS SELF ADMINISTERED DRUGS (ALT 637 FOR MEDICARE OP): Performed by: FAMILY MEDICINE

## 2024-06-14 PROCEDURE — 2500000002 HC RX 250 W HCPCS SELF ADMINISTERED DRUGS (ALT 637 FOR MEDICARE OP, ALT 636 FOR OP/ED): Performed by: NURSE PRACTITIONER

## 2024-06-14 PROCEDURE — 36415 COLL VENOUS BLD VENIPUNCTURE: CPT | Performed by: NURSE PRACTITIONER

## 2024-06-14 PROCEDURE — 85027 COMPLETE CBC AUTOMATED: CPT | Performed by: NURSE PRACTITIONER

## 2024-06-14 PROCEDURE — 80048 BASIC METABOLIC PNL TOTAL CA: CPT | Performed by: NURSE PRACTITIONER

## 2024-06-14 PROCEDURE — 2500000001 HC RX 250 WO HCPCS SELF ADMINISTERED DRUGS (ALT 637 FOR MEDICARE OP): Performed by: NURSE PRACTITIONER

## 2024-06-14 RX ORDER — LOSARTAN POTASSIUM 100 MG/1
50 TABLET ORAL 2 TIMES DAILY
Start: 2024-06-14

## 2024-06-14 RX ORDER — FUROSEMIDE 20 MG/1
20 TABLET ORAL
Qty: 16 TABLET | Refills: 1 | Status: SHIPPED | OUTPATIENT
Start: 2024-06-15 | End: 2024-08-14

## 2024-06-14 ASSESSMENT — PAIN SCALES - GENERAL
PAINLEVEL_OUTOF10: 0 - NO PAIN

## 2024-06-14 ASSESSMENT — COGNITIVE AND FUNCTIONAL STATUS - GENERAL
CLIMB 3 TO 5 STEPS WITH RAILING: A LITTLE
DAILY ACTIVITIY SCORE: 24
MOBILITY SCORE: 23
DAILY ACTIVITIY SCORE: 23
DRESSING REGULAR LOWER BODY CLOTHING: A LITTLE
MOBILITY SCORE: 24

## 2024-06-14 ASSESSMENT — PAIN - FUNCTIONAL ASSESSMENT
PAIN_FUNCTIONAL_ASSESSMENT: 0-10

## 2024-06-14 NOTE — PROGRESS NOTES
"Shona Montgomery is a 89 y.o. female on day 1 of admission presenting with Acute on chronic HFrEF (heart failure with reduced ejection fraction) (Multi).    Subjective   Awake in bed, daughter at bedside. Denies an complaints or concerns at this time   Feels berathign is ok  No leg swelling        Objective     Physical Exam  Constitutional:       Appearance: Normal appearance.   Cardiovascular:      Rate and Rhythm: Normal rate and regular rhythm.      Pulses: Normal pulses.      Heart sounds: Normal heart sounds. No murmur heard.     No gallop.   Pulmonary:      Effort: Pulmonary effort is normal. No respiratory distress.      Breath sounds: Normal breath sounds. No wheezing or rhonchi.   Abdominal:      General: Abdomen is flat. Bowel sounds are normal. There is no distension.      Palpations: Abdomen is soft.      Tenderness: There is no abdominal tenderness. There is no guarding.   Musculoskeletal:      Right lower le+ Edema present.      Left lower le+ Edema present.   Skin:     General: Skin is warm.      Capillary Refill: Capillary refill takes less than 2 seconds.   Neurological:      Mental Status: She is alert and oriented to person, place, and time.   Psychiatric:         Mood and Affect: Mood normal.         Thought Content: Thought content normal.         Judgment: Judgment normal.         Last Recorded Vitals  Blood pressure 111/69, pulse 58, temperature 36.7 °C (98.1 °F), temperature source Temporal, resp. rate 16, height 1.575 m (5' 2\"), weight 49.6 kg (109 lb 4.8 oz), SpO2 97%.  Intake/Output last 3 Shifts:  I/O last 3 completed shifts:  In: 621 (12.5 mL/kg) [P.O.:360; IV Piggyback:261]  Out: 1150 (23.2 mL/kg) [Urine:1150 (0.6 mL/kg/hr)]  Weight: 49.6 kg     Relevant Results  Scheduled medications  amiodarone, 100 mg, oral, Daily  amLODIPine, 2.5 mg, oral, Daily  apixaban, 2.5 mg, oral, BID  atorvastatin, 80 mg, oral, Nightly  clopidogrel, 75 mg, oral, Daily  docusate sodium, 100 mg, oral, " BID  [Held by provider] furosemide, 20 mg, intravenous, q12h  insulin glargine, 5 Units, subcutaneous, q24h  insulin lispro, 0-5 Units, subcutaneous, TID  [Held by provider] isosorbide mononitrate ER, 60 mg, oral, Daily  [Held by provider] losartan, 50 mg, oral, BID  metoprolol succinate XL, 12.5 mg, oral, Nightly  pantoprazole, 40 mg, oral, Daily before breakfast   Or  pantoprazole, 40 mg, intravenous, Daily before breakfast  polyethylene glycol, 17 g, oral, Daily  potassium chloride CR, 10 mEq, oral, Daily      Continuous medications     PRN medications  PRN medications: acetaminophen **OR** acetaminophen **OR** acetaminophen, dextrose, dextrose, glucagon, glucagon, nitroglycerin, ondansetron **OR** ondansetron    Results for orders placed or performed during the hospital encounter of 06/12/24 (from the past 24 hour(s))   POCT GLUCOSE   Result Value Ref Range    POCT Glucose 96 74 - 99 mg/dL   POCT GLUCOSE   Result Value Ref Range    POCT Glucose 284 (H) 74 - 99 mg/dL   Basic metabolic panel   Result Value Ref Range    Glucose 191 (H) 74 - 99 mg/dL    Sodium 137 136 - 145 mmol/L    Potassium 3.8 3.5 - 5.3 mmol/L    Chloride 103 98 - 107 mmol/L    Bicarbonate 26 21 - 32 mmol/L    Anion Gap 12 10 - 20 mmol/L    Urea Nitrogen 38 (H) 6 - 23 mg/dL    Creatinine 1.75 (H) 0.50 - 1.05 mg/dL    eGFR 27 (L) >60 mL/min/1.73m*2    Calcium 9.5 8.6 - 10.3 mg/dL   CBC   Result Value Ref Range    WBC 5.2 4.4 - 11.3 x10*3/uL    nRBC 0.0 0.0 - 0.0 /100 WBCs    RBC 3.53 (L) 4.00 - 5.20 x10*6/uL    Hemoglobin 10.9 (L) 12.0 - 16.0 g/dL    Hematocrit 33.5 (L) 36.0 - 46.0 %    MCV 95 80 - 100 fL    MCH 30.9 26.0 - 34.0 pg    MCHC 32.5 32.0 - 36.0 g/dL    RDW 13.3 11.5 - 14.5 %    Platelets 193 150 - 450 x10*3/uL   Magnesium   Result Value Ref Range    Magnesium 1.90 1.60 - 2.40 mg/dL   POCT GLUCOSE   Result Value Ref Range    POCT Glucose 148 (H) 74 - 99 mg/dL   POCT GLUCOSE   Result Value Ref Range    POCT Glucose 368 (H) 74 - 99 mg/dL      XR chest 1 view    Result Date: 6/12/2024  Interpreted By:  Torsten Akers, STUDY: XR CHEST 1 VIEW;; 6/12/2024 9:19 pm   INDICATION: Signs/Symptoms:Chest Pain.   COMPARISON: 12/23/2023   ACCESSION NUMBER(S): [id number]   ORDERING CLINICIAN: VERONICA KNUTSON   FINDINGS: Pulmonary hyperinflation similar to prior concerning for COPD. The cardiac silhouette is stable for the technique. Tortuous aorta with wall calcification but similar to prior. Coarse interstitial markings throughout the lung fields bilaterally similar to prior. No consolidations, effusions, infiltrates or pneumothorax.       No evidence for acute cardiopulmonary process. No significant interval change since prior. If there is clinical concern for acute aortic pathology or pulmonary embolic disease, further evaluation with chest CT should be considered.       Signed by: Torsten Akers 6/12/2024 9:48 PM Dictation workstation:   [id number]              Assessment/Plan   Principal Problem:    Acute on chronic HFrEF (heart failure with reduced ejection fraction) (Multi)  Active Problems:    Acute on chronic congestive heart failure, unspecified heart failure type (Multi)    Shona Montgomery is a 89 y.o. female with past medical history significant for HTN, HLD, Afib on eliquis, CAD s/p CABG, HFrEF/NICM, mild-moderate MR, DM type II, CKD, anemia. [place] [facility] [place] of  bilateral lower extremity edema for the past few days. Denies any LE pain. Denies any shortness of breath, chest pain, cough, LH/dizziness, palpitations. Her daughter checks her blood pressure at home today and it was in the 180s over 90s today. She takes her lasix MWF, has not missed any doses. Weight went from 108 lb late last week up to 112 to 113lb over the past few days. Denies any dietary indiscretions. Has not missed any doses of her eliquis. Per ED report she was c/o of back pain which sometimes happens with her prior MI, she tells me this was a couple of days ago  and has resolved. ED COURSE: /66, HR 58, otherwise VSS, on room air. Labs notable for blood glucose of 194, Cr. 1.43/Bun 30 which appears her baseline, , trop 23->25, h/h 11.0/33.6. CXR with no acute findings. EKG SB HR 58, no acute ischemic changes.      Acute on chronic HFrEF, NICM   Will plan on dc to home   Lasix 4 times a week and can take as needed for leg swelling     Elevated troponin, flat trend. Suspect type II demand ischemia   EKG no ischemic changes  Denies any c/o chest pain  -monitor on tele     CAD, s/p CABG  -continue plavix/statin     pAF  -continue eliquis/amio/metop, monitor on tele      HTN  Cont with losartan if ok with cardio      HLD  -statin      CKD  -Stable, monitor   -creat 1.43 on admit, baseline  -avoid nephrotoxic agents       DM type II  -Accu-checks achs, ssi w/meals, hypoglycemia protocol     DVT/GI prophylaxis  -eliquis  -PPI    DC plan:  DC home today   Followup with me in office in 7 - 10 days    I spent 45 minutes in the professional and overall care of this patient.      Elieser Marks MD

## 2024-06-14 NOTE — NURSING NOTE
Discharge instructions given to patient and patient's daughter. Both verbalized understanding. Patient dressed by Lisa nursing student and IV removed. Patient with all of belongings and ready for discharge. Transport put in for patient to be taken to main lobby via wheel chair.

## 2024-06-14 NOTE — PROGRESS NOTES
06/14/24 1201   Discharge Planning   Patient expects to be discharged to: Home with family     Met with patient at bedside along with daughter also names Shona at bedside    Daughter states that patient lives in her own home but has one of her children in her home to cook dinner, stay over night and help her get up in the morning, (each of her 7 children take a evening/night to stay with patient) Patient is generally alone from breakfast until close to dinner time, patient takes care of her own breakfast and lunch and if she has appts or somewhere to be one of her family members providers all her transportation. No additional home going needs per family, daughter at bedside states that cardiology was in and will sign off, Dr Marks is pcp and will see patient today in hospital and then decide on discharge, patient and family hoping dc for today.     ADOD today  BARRIERS med clearance by pcp  DISPO home with family

## 2024-06-14 NOTE — PROGRESS NOTES
Subjective Data:  She states she feels better  She says she is back to her basal state  No chest pain, no chest pressure, no trouble to breath  Sinus rhythm at telemetry  Weight today is 109 lbs      Overnight Events:    None reported    Objective Data:  Last Recorded Vitals:  Vitals:    06/13/24 2227 06/14/24 0338 06/14/24 0500 06/14/24 0745   BP: 108/53 169/80  160/83   BP Location: Right arm   Right arm   Patient Position: Lying   Lying   Pulse: 54 50  52   Resp: 18 16  16   Temp: 36.7 °C (98.1 °F) 36.5 °C (97.7 °F)  36.7 °C (98.1 °F)   TempSrc: Temporal   Temporal   SpO2: 97% 95%  97%   Weight:   49.6 kg (109 lb 4.8 oz)    Height:           Last Labs:  CBC - 6/14/2024:  5:12 AM  5.2 10.9 193    33.5      CMP - 6/14/2024:  5:12 AM  9.5 6.6 17 --- 0.4   _ 3.8 17 92      PTT - 6/12/2024:  9:05 PM  1.1   12.9 33     TROPHS   Date/Time Value Ref Range Status   06/12/2024 10:35 PM 25 0 - 13 ng/L Final   06/12/2024 09:05 PM 23 0 - 13 ng/L Final   12/24/2023 02:49 AM 73 0 - 13 ng/L Final     Comment:     Previous result verified on 12/23/2023 0535 on specimen/case [id number] called with component [facility] for procedure Troponin I, High Sensitivity with value 54 ng/L.     BNP   Date/Time Value Ref Range Status   06/12/2024 09:05  0 - 99 pg/mL Final   12/22/2023 12:22  0 - 99 pg/mL Final     HGBA1C   Date/Time Value Ref Range Status   02/16/2024 03:25 PM 8.1 see below % Final   04/20/2023 04:34 PM 8.3 % Final     Comment:          Diagnosis of Diabetes-Adults   Non-Diabetic: < or = 5.6%   Increased risk for developing diabetes: 5.7-6.4%   Diagnostic of diabetes: > or = 6.5%  .       Monitoring of Diabetes                Age (y)     Therapeutic Goal (%)   Adults:          >18           <7.0   Pediatrics:    13-18           <7.5                   7-12           <8.0                   0- 6            7.5-8.5   American Diabetes Association. Diabetes Care 33(S1), Jan 2010.     12/15/2022 04:12 PM 6.9 % Final      "Comment:          Diagnosis of Diabetes-Adults   Non-Diabetic: < or = 5.6%   Increased risk for developing diabetes: 5.7-6.4%   Diagnostic of diabetes: > or = 6.5%  .       Monitoring of Diabetes                Age (y)     Therapeutic Goal (%)   Adults:          >18           <7.0   Pediatrics:    13-18           <7.5                   7-12           <8.0                   0- 6            7.5-8.5   American Diabetes Association. Diabetes Care 33(S1), Jan 2010.       VLDL   Date/Time Value Ref Range Status   02/16/2023 12:54 PM 18 0 - 40 mg/dL Final   04/10/2022 06:13 AM 20 0 - 40 mg/dL Final   04/04/2021 05:16 AM 13 0 - 40 mg/dL Final      Last I/O:  I/O last 3 completed shifts:  In: 621 (12.5 mL/kg) [P.O.:360; IV Piggyback:261]  Out: 1150 (23.2 mL/kg) [Urine:1150 (0.6 mL/kg/hr)]  Weight: 49.6 kg     Past Cardiology Tests (Last 3 Years):  EKG:  ECG 12 lead 06/12/2024 (Preliminary)      ECG 12 lead 06/12/2024 (Preliminary)      ECG 12 lead (Clinic Performed) 04/30/2024      ECG 12 lead (Clinic Performed) 01/09/2024      Electrocardiogram, 12-lead PRN ACS symptoms 12/26/2023      ECG 12 lead 12/22/2023      ECG 12 lead (Clinic Performed) 12/18/2023      ECG 12 lead (Clinic Performed) 10/24/2023    Echo:  No results found for this or any previous visit from the past 1095 days.    Ejection Fractions:  No results found for: \"EF\"  Cath:  No results found for this or any previous visit from the past 1095 days.    Stress Test:  No results found for this or any previous visit from the past 1095 days.    Cardiac Imaging:  No results found for this or any previous visit from the past 1095 days.      Inpatient Medications:  Scheduled medications   Medication Dose Route Frequency    amiodarone  100 mg oral Daily    amLODIPine  2.5 mg oral Daily    apixaban  2.5 mg oral BID    atorvastatin  80 mg oral Nightly    clopidogrel  75 mg oral Daily    docusate sodium  100 mg oral BID    [Held by provider] furosemide  20 mg intravenous " q12h    insulin glargine  5 Units subcutaneous q24h    insulin lispro  0-5 Units subcutaneous TID    [Held by provider] isosorbide mononitrate ER  60 mg oral Daily    [Held by provider] losartan  50 mg oral BID    metoprolol succinate XL  12.5 mg oral Nightly    pantoprazole  40 mg oral Daily before breakfast    Or    pantoprazole  40 mg intravenous Daily before breakfast    polyethylene glycol  17 g oral Daily    potassium chloride CR  10 mEq oral Daily     PRN medications   Medication    acetaminophen    Or    acetaminophen    Or    acetaminophen    dextrose    dextrose    glucagon    glucagon    nitroglycerin    ondansetron    Or    ondansetron     Continuous Medications   Medication Dose Last Rate       Physical Exam:  General:  Patient is awake, alert, and oriented.  Patient is in no acute distress.  HEENT:  Normocephalic.  Moist mucosa.    Neck:  Normal Jugular Venous Pressure.  Cardiovascular:  Regular rate and rhythm.  Normal S1 and S2, no murmurs, rubs or gallops  Pulmonary:  Clear to auscultation bilaterally, diminished in bases.   Abdomen:  Soft. Non-tender.   Non-distended.  Positive bowel sounds.  Lower Extremities:  2+ pedal pulses. Edema (-)  Neurologic:  Alert and oriented x3. No focal deficit.   Skin: Skin warm and dry, normal skin turgor.   Psychiatric: Normal affect.        Assessment/Plan   Shona Montgomery is a 89-year-old female with a history of hypertension, hyperlipidemia, CKD stage III, paroxysmal atrial fibrillation on Eliquis, non-ischemic cardiomyopathy (EF 35-40%), mild to moderate mitral regurgitation, CAD status post-CABG in 2016, prior NSTEMI with a patent sequential LIMA and occluded SVG-RCA, chronic systolic heart failure, and type 2 diabetes, presenting with uncontrolled hypertension and bilateral leg swelling. Cardiology was consulted for CHF management.     Assessment includes acute on chronic systolic heart failure with an increase in weight to 112 lbs from a dry weight of 108  lbs, CAD post-CABG, pAF maintained on amiodarone 100 mg daily, uncontrolled hypertension with SBP in the 180s, hypokalemia (replaced), and type 2 diabetes with a last A1C of 8.1. Troponin levels showed a flat trend consistent with non-MI troponin elevation. Recent labs showed , HS troponin 23, 25, /66, HR 58, NSR on EKG, and clear CXR.     Assessment:  # Acute on chronic systolic heart failure, ICM    - dry weight is 108lbs.  Weight has crept up to 112lbs the past few weeks.    # CAD s/p CABG 2016  # pAF on low dose Eliquis    - maintaining SB/ SR on amiodarone 100mg daily  # Hypertension, uncontrolled    - 's upon admit.    # Hypokalemia, replaced   # DM2, last A1C 8.1 in 2/2024  # Mildly elevated troponin    - flat trend in patient with chronic low level leak.  Low level elevation with a flat trend consistent with a non-MI troponin elevation / chronic non-traumatic myocardial injury in the setting of above. Core measures do not apply.     6/13 >  ( previous 649)),  HS troponin 23, 25. Hypertensive in ED  with /66, HR 58.   EKG showing NSR.  BUN/ cr 90/1.43.  CXR clear.      Plan:  - Continue with amlodipine 2.5mg once a day  - Increase home furosemide to 20mg 4 times a week, plus PRN in case of edema/weight increase.   - Continue home amiodarone 100mg daily, Toprol 12.5mg daily, and apixaban 2.5mg BID  - Continue clopidogrel 75mg daily, and atorvastatin 80mg nightly  - Continue Imdur 60mg daily, and losartan 50mg daily  - No barriers to discharge from a cardiological standpoint.   - Follow up with established cardiologist.    - Cardiology will sing off, please call if questions    Code Status:  Full Code    Jalen Camara MD

## 2024-06-15 LAB
ATRIAL RATE: 54 BPM
ATRIAL RATE: 59 BPM
GLUCOSE BLD MANUAL STRIP-MCNC: 147 MG/DL (ref 74–99)
P AXIS: 45 DEGREES
P AXIS: 79 DEGREES
P OFFSET: 189 MS
P OFFSET: 202 MS
P ONSET: 136 MS
P ONSET: 139 MS
PR INTERVAL: 150 MS
PR INTERVAL: 156 MS
Q ONSET: 211 MS
Q ONSET: 217 MS
QRS COUNT: 9 BEATS
QRS COUNT: 9 BEATS
QRS DURATION: 112 MS
QRS DURATION: 118 MS
QT INTERVAL: 452 MS
QT INTERVAL: 454 MS
QTC CALCULATION(BAZETT): 428 MS
QTC CALCULATION(BAZETT): 449 MS
QTC FREDERICIA: 436 MS
QTC FREDERICIA: 451 MS
R AXIS: -16 DEGREES
R AXIS: 45 DEGREES
T AXIS: 132 DEGREES
T AXIS: 31 DEGREES
T OFFSET: 437 MS
T OFFSET: 444 MS
VENTRICULAR RATE: 54 BPM
VENTRICULAR RATE: 59 BPM

## 2024-06-17 ENCOUNTER — TELEPHONE (OUTPATIENT)
Dept: CARDIOLOGY | Facility: CLINIC | Age: 89
End: 2024-06-17
Payer: MEDICARE

## 2024-06-19 ENCOUNTER — TELEPHONE (OUTPATIENT)
Dept: CARDIOLOGY | Facility: CLINIC | Age: 89
End: 2024-06-19
Payer: MEDICARE

## 2024-06-19 NOTE — DISCHARGE SUMMARY
Discharge Diagnosis  Acute on chronic HFrEF (heart failure with reduced ejection fraction) (Multi)    Issues Requiring Follow-Up  Blood pressure    Discharge Meds     Your medication list        CHANGE how you take these medications        Instructions Last Dose Given Next Dose Due   furosemide 20 mg tablet  Commonly known as: Lasix  Start taking on: Joan 15, 2024  What changed: when to take this      Take 1 tablet (20 mg) by mouth 4 times a week. Take on Monday, Wednesday, and Friday       losartan 100 mg tablet  Commonly known as: Cozaar  What changed: additional instructions      Take 0.5 tablets (50 mg) by mouth 2 times a day. Hold if the top number for blood pressure is less then 130       metoprolol succinate XL 25 mg 24 hr tablet  Commonly known as: Toprol-XL  What changed:   how much to take  how to take this  when to take this      1/2 tablet by mouth daily              CONTINUE taking these medications        Instructions Last Dose Given Next Dose Due   amiodarone 100 mg tablet  Commonly known as: Pacerone      Take 1 tablet (100 mg) by mouth once daily.       amLODIPine 2.5 mg tablet  Commonly known as: Norvasc      Take 1 tablet (2.5 mg) by mouth once daily.       apixaban 2.5 mg tablet  Commonly known as: Eliquis      Take 1 tablet (2.5 mg) by mouth 2 times a day.       atorvastatin 80 mg tablet  Commonly known as: Lipitor      Take 1 tablet (80 mg) by mouth once daily at bedtime.       clopidogrel 75 mg tablet  Commonly known as: Plavix      Take 1 tablet (75 mg) by mouth once daily.       Colace 100 mg capsule  Generic drug: docusate sodium           cyanocobalamin 500 mcg tablet  Commonly known as: Vitamin B-12           ferrous sulfate 325 (65 Fe) MG EC tablet           isosorbide mononitrate ER 60 mg 24 hr tablet  Commonly known as: Imdur      Take 1 tablet (60 mg) by mouth once daily.       lancets misc  Commonly known as: Accu-Chek Fastclix Lancet Drum      1 Lancet 3 times a day.        nitroglycerin 0.4 mg SL tablet  Commonly known as: Nitrostat      Place 1 tablet (0.4 mg) under the tongue every 5 minutes if needed for chest pain.       OneTouch Ultra Test strip  Generic drug: blood sugar diagnostic           Tresiba FlexTouch U-100 100 unit/mL (3 mL) injection  Generic drug: insulin degludec                     Where to Get Your Medications        These medications were sent to [facility] - [place] - [facility] AT [address] [facility], [place] [address]      Phone: [phone]   furosemide 20 mg tablet       Information about where to get these medications is not yet available    Ask your nurse or doctor about these medications  losartan 100 mg tablet         Test Results Pending At Discharge  Pending Labs       No current pending labs.            Hospital Course   Pt admitted to hospital for leg swelling and chf   She was diuresed,  She did develop low blood pressure which improved and she was discharged to home and followup in office with me and cardiology     Pertinent Physical Exam At Time of Discharge  Physical Exam    Outpatient Follow-Up  Future Appointments   Date Time Provider Department Center   6/25/2024  1:30 PM MAGALI Laws-CNP [id number] [facility]   8/15/2024  4:00 PM Martha Domingo MD [id number] [facility]   10/9/2024  2:30 PM Mohsen Mendiola MD [id number] [facility]         Elieser Marks MD

## 2024-06-25 ENCOUNTER — OFFICE VISIT (OUTPATIENT)
Dept: CARDIOLOGY | Facility: HOSPITAL | Age: 89
End: 2024-06-25
Payer: MEDICARE

## 2024-06-25 VITALS
SYSTOLIC BLOOD PRESSURE: 162 MMHG | WEIGHT: 111 LBS | OXYGEN SATURATION: 97 % | HEIGHT: 62 IN | HEART RATE: 55 BPM | BODY MASS INDEX: 20.43 KG/M2 | DIASTOLIC BLOOD PRESSURE: 73 MMHG

## 2024-06-25 DIAGNOSIS — I10 PRIMARY HYPERTENSION: ICD-10-CM

## 2024-06-25 DIAGNOSIS — I50.9 ACUTE ON CHRONIC CONGESTIVE HEART FAILURE, UNSPECIFIED HEART FAILURE TYPE (MULTI): ICD-10-CM

## 2024-06-25 DIAGNOSIS — I48.20 CHRONIC ATRIAL FIBRILLATION (MULTI): Primary | ICD-10-CM

## 2024-06-25 PROCEDURE — 1111F DSCHRG MED/CURRENT MED MERGE: CPT | Performed by: NURSE PRACTITIONER

## 2024-06-25 PROCEDURE — 1160F RVW MEDS BY RX/DR IN RCRD: CPT | Performed by: NURSE PRACTITIONER

## 2024-06-25 PROCEDURE — 99214 OFFICE O/P EST MOD 30 MIN: CPT | Performed by: NURSE PRACTITIONER

## 2024-06-25 PROCEDURE — 1159F MED LIST DOCD IN RCRD: CPT | Performed by: NURSE PRACTITIONER

## 2024-06-25 PROCEDURE — 3078F DIAST BP <80 MM HG: CPT | Performed by: NURSE PRACTITIONER

## 2024-06-25 PROCEDURE — 1157F ADVNC CARE PLAN IN RCRD: CPT | Performed by: NURSE PRACTITIONER

## 2024-06-25 PROCEDURE — 3077F SYST BP >= 140 MM HG: CPT | Performed by: NURSE PRACTITIONER

## 2024-06-25 PROCEDURE — 1036F TOBACCO NON-USER: CPT | Performed by: NURSE PRACTITIONER

## 2024-06-25 PROCEDURE — 93005 ELECTROCARDIOGRAM TRACING: CPT | Performed by: NURSE PRACTITIONER

## 2024-06-25 RX ORDER — ACETAMINOPHEN 500 MG
1 TABLET ORAL DAILY
Qty: 1 EACH | Refills: 0 | Status: ON HOLD | OUTPATIENT
Start: 2024-06-25

## 2024-06-25 NOTE — PROGRESS NOTES
Subjective   Shona Montgomery is a 89 y.o. female.    Chief Complaint:  hx: NSTEMI, Atrial Fibrillation, Hypertension, Heart Failure, and Hyperlipidemia    Mrs. Montgomery returns for an interval follow up. She was recently hospitalized for uncontrolled hypertension and lower extremity swelling. She had about a 4 lb. Weight gain. She is now back to her dry weight of 108 lbs on her home scale. While in the hospital she also had mildly elevated troponins with a flat trend, suspected type II demand ischemia. She had no new ischemic changes on her EKG. She has been feeling fairly well since discharge. She has been taking lasix 3x/week with an extra tablet as needed. She denies any other medication changes. They offer no new complaints or concerns today. He denies any complaints of chest pain, shortness of breath, lightheadedness, dizziness, palpitations, syncope, orthopnea, paroxysmal nocturnal dyspnea, lower extremity swelling or bleeding concerns.                  Review of Systems   All other systems reviewed and are negative.      Objective   Physical Exam  Constitutional:       Appearance: Healthy appearance. In no distress  Pulmonary:      Effort: Pulmonary effort is normal.      Breath sounds: Normal breath sounds.   Cardiovascular:      Normal rate. Regular rhythm. Normal S1. Normal S2.       Murmurs: There is 2/6 systolic murmur.      Carotids: right carotid pulse +2, no bruit heard over the right carotid. left carotid pulse +2, no bruit heard over the left carotid.  Edema:     Peripheral edema absent.   Abdominal:      Palpations: Abdomen is soft.   Musculoskeletal:       Cervical back: Normal range of motion.   Skin:     General: Skin is warm and dry. Normal color and pigmentation   Neurological:      Mental Status: Alert and oriented to person, place and time.   Psychiatric:     Mood and Affect: appropriate mood and appropriate affect.     EKG obtained and reviewed. Sinus bradycardia. ST and T wave abnormality. HR  55      Lab Review:   Lab Results   Component Value Date     06/14/2024    K 3.8 06/14/2024     06/14/2024    CO2 26 06/14/2024    BUN 38 (H) 06/14/2024    CREATININE 1.75 (H) 06/14/2024    GLUCOSE 191 (H) 06/14/2024    CALCIUM 9.5 06/14/2024     Lab Results   Component Value Date    WBC 5.2 06/14/2024    HGB 10.9 (L) 06/14/2024    HCT 33.5 (L) 06/14/2024    MCV 95 06/14/2024     06/14/2024     Lab Results   Component Value Date    CHOL 158 02/16/2023    TRIG 91 02/16/2023    HDL 62.8 02/16/2023       Assessment/Plan   Mrs. Montgomery is a 89-year-old  female with a past medical history significant for hypertension, hyperlipidemia, diabetes, CKD, NICM/HFrEF, atrial fibrillation on eliquis anticoagulation and CAD s/p remote CABG and prior NSTEMI. Echocardiogram 4/2022 showed an EF of 35-40% with moderate MR. She presents today following a hospitalization for uncontrolled hypertension and lower extremity swelling. Her VS and EKG remain stable. She remains euvolemic on exam. I will have her continue all medications unchanged. She will follow up with us in clinic in 6 weeks to closely monitor her BP and for any new swelling. She as well as her daughter know to call for any concerns.

## 2024-06-26 ENCOUNTER — TELEPHONE (OUTPATIENT)
Dept: PRIMARY CARE | Facility: CLINIC | Age: 89
End: 2024-06-26
Payer: MEDICARE

## 2024-06-26 ENCOUNTER — TELEPHONE (OUTPATIENT)
Dept: CARDIOLOGY | Facility: CLINIC | Age: 89
End: 2024-06-26
Payer: MEDICARE

## 2024-06-26 LAB
ATRIAL RATE: 55 BPM
P AXIS: 80 DEGREES
P OFFSET: 205 MS
P ONSET: 140 MS
PR INTERVAL: 160 MS
Q ONSET: 220 MS
QRS COUNT: 9 BEATS
QRS DURATION: 112 MS
QT INTERVAL: 452 MS
QTC CALCULATION(BAZETT): 432 MS
QTC FREDERICIA: 439 MS
R AXIS: 24 DEGREES
T AXIS: 112 DEGREES
T OFFSET: 446 MS
VENTRICULAR RATE: 55 BPM

## 2024-06-27 ENCOUNTER — HOSPITAL ENCOUNTER (EMERGENCY)
Facility: HOSPITAL | Age: 89
Discharge: HOME | DRG: 177 | End: 2024-06-27
Attending: STUDENT IN AN ORGANIZED HEALTH CARE EDUCATION/TRAINING PROGRAM
Payer: MEDICARE

## 2024-06-27 ENCOUNTER — APPOINTMENT (OUTPATIENT)
Dept: RADIOLOGY | Facility: HOSPITAL | Age: 89
End: 2024-06-27
Payer: MEDICARE

## 2024-06-27 ENCOUNTER — TELEPHONE (OUTPATIENT)
Dept: CARDIOLOGY | Facility: CLINIC | Age: 89
End: 2024-06-27

## 2024-06-27 VITALS
TEMPERATURE: 98.9 F | DIASTOLIC BLOOD PRESSURE: 80 MMHG | HEART RATE: 74 BPM | WEIGHT: 111 LBS | HEIGHT: 63 IN | SYSTOLIC BLOOD PRESSURE: 186 MMHG | RESPIRATION RATE: 18 BRPM | OXYGEN SATURATION: 99 % | BODY MASS INDEX: 19.67 KG/M2

## 2024-06-27 DIAGNOSIS — U07.1 COVID-19: Primary | ICD-10-CM

## 2024-06-27 DIAGNOSIS — I10 HYPERTENSION, UNSPECIFIED TYPE: ICD-10-CM

## 2024-06-27 PROBLEM — I48.11 LONGSTANDING PERSISTENT ATRIAL FIBRILLATION (MULTI): Status: ACTIVE | Noted: 2023-07-18

## 2024-06-27 PROBLEM — M79.672 PAIN IN BOTH FEET: Status: ACTIVE | Noted: 2024-06-27

## 2024-06-27 PROBLEM — N20.0 CALCULUS OF KIDNEY: Status: ACTIVE | Noted: 2024-06-27

## 2024-06-27 PROBLEM — E11.42 POLYNEUROPATHY DUE TO TYPE 2 DIABETES MELLITUS (MULTI): Status: ACTIVE | Noted: 2024-06-06

## 2024-06-27 PROBLEM — R26.2 DIFFICULTY WALKING: Status: ACTIVE | Noted: 2024-06-06

## 2024-06-27 PROBLEM — L03.039 CELLULITIS OF TOE: Status: ACTIVE | Noted: 2024-06-27

## 2024-06-27 PROBLEM — M67.919 DISORDER OF ROTATOR CUFF: Status: ACTIVE | Noted: 2024-06-27

## 2024-06-27 PROBLEM — B35.1 ONYCHOMYCOSIS: Status: ACTIVE | Noted: 2024-06-06

## 2024-06-27 PROBLEM — R14.1 ABDOMINAL GAS PAIN: Status: ACTIVE | Noted: 2024-06-27

## 2024-06-27 PROBLEM — M20.41 HAMMER TOE OF RIGHT FOOT: Status: ACTIVE | Noted: 2024-06-06

## 2024-06-27 PROBLEM — N18.30 STAGE 3 CHRONIC KIDNEY DISEASE (MULTI): Status: ACTIVE | Noted: 2024-01-10

## 2024-06-27 PROBLEM — R11.2 NAUSEA AND VOMITING: Status: ACTIVE | Noted: 2024-06-27

## 2024-06-27 PROBLEM — D64.9 ANEMIA: Status: ACTIVE | Noted: 2024-06-27

## 2024-06-27 PROBLEM — M79.671 PAIN IN BOTH FEET: Status: ACTIVE | Noted: 2024-06-27

## 2024-06-27 LAB
FLUAV RNA RESP QL NAA+PROBE: NOT DETECTED
FLUBV RNA RESP QL NAA+PROBE: NOT DETECTED
S PYO DNA THROAT QL NAA+PROBE: NOT DETECTED
SARS-COV-2 RNA RESP QL NAA+PROBE: DETECTED

## 2024-06-27 PROCEDURE — 87636 SARSCOV2 & INF A&B AMP PRB: CPT | Performed by: PHYSICIAN ASSISTANT

## 2024-06-27 PROCEDURE — 87651 STREP A DNA AMP PROBE: CPT | Performed by: PHYSICIAN ASSISTANT

## 2024-06-27 PROCEDURE — 87636 SARSCOV2 & INF A&B AMP PRB: CPT | Performed by: STUDENT IN AN ORGANIZED HEALTH CARE EDUCATION/TRAINING PROGRAM

## 2024-06-27 PROCEDURE — 2500000001 HC RX 250 WO HCPCS SELF ADMINISTERED DRUGS (ALT 637 FOR MEDICARE OP): Performed by: PHYSICIAN ASSISTANT

## 2024-06-27 PROCEDURE — 71046 X-RAY EXAM CHEST 2 VIEWS: CPT

## 2024-06-27 PROCEDURE — 99283 EMERGENCY DEPT VISIT LOW MDM: CPT

## 2024-06-27 PROCEDURE — 87651 STREP A DNA AMP PROBE: CPT | Performed by: STUDENT IN AN ORGANIZED HEALTH CARE EDUCATION/TRAINING PROGRAM

## 2024-06-27 PROCEDURE — 71046 X-RAY EXAM CHEST 2 VIEWS: CPT | Mod: FOREIGN READ | Performed by: RADIOLOGY

## 2024-06-27 RX ORDER — ACETAMINOPHEN 325 MG/1
325 TABLET ORAL ONCE
Status: COMPLETED | OUTPATIENT
Start: 2024-06-27 | End: 2024-06-27

## 2024-06-27 RX ORDER — AMLODIPINE BESYLATE 5 MG/1
2.5 TABLET ORAL ONCE
Status: COMPLETED | OUTPATIENT
Start: 2024-06-27 | End: 2024-06-27

## 2024-06-27 ASSESSMENT — PAIN - FUNCTIONAL ASSESSMENT: PAIN_FUNCTIONAL_ASSESSMENT: 0-10

## 2024-06-27 ASSESSMENT — PAIN SCALES - GENERAL: PAINLEVEL_OUTOF10: 5 - MODERATE PAIN

## 2024-06-27 ASSESSMENT — PAIN DESCRIPTION - LOCATION: LOCATION: GENERALIZED

## 2024-06-27 NOTE — ED PROVIDER NOTES
"Chief Complaint   Patient presents with    Flu Symptoms     Covid exposure     HPI:   Shona Montgomery is an 89 y.o. female with complicated PMH including CAD (s/p NSTEMI, on Plavix), A-fib (on amiodarone and Eliquis), HTN, HFrEF (EF 41 to 49%), HLD, T2DM with CKD, polyarthritis, anemia who was admitted to this hospital from 6/12-14/2024 for HFrEF and fluid overload presents to the ED with her daughter for evaluation of multiple viral type symptoms.  Patient states that she is having scratchy throat that started last night.  For the past few days she has been aching all over.  She endorses runny nose and congestion.  She was exposed to family member on Saturday and again on Tuesday that has now since tested positive for COVID and patient is concerned she may have COVID.  She said she has never had COVID in the past.  She denies any chest pain.  She says that she does have a cough that initially started out as dry and now is moist.  She denies shortness of breath but daughter at bedside says yesterday patient was complaining that the air in her house \"felt heavy\" and it was difficult to get a deep breath and secondary to this.  Patient said she turned on a fan and there was some improvement.  She denies any nausea, vomiting, diarrhea, abdominal pain.  Says she had Cheerios and banana for breakfast.  She denies any dizziness or lightheadedness.  Denies any recent falls or injuries.  Lives alone but all of her children take turns caring for her and she has one of her children with her every day for dinner onward throughout the night, so she is never home alone at night.  Daughter at bedside says that she has been having problems keeping her blood pressure under good control.  Her doctors would like her systolic to be between 130 and 150 and is been greater than 160 even with her medications.  She saw her doctor yesterday who advised that if her blood pressure is high around 1530 she should take an extra blood pressure " "medication.  She is not started doing this yet because they have not heard back from cardiology to given the go ahead.    Medications: Lasix, losartan, metoprolol, amiodarone, amlodipine, apixaban, atorvastatin, clopidigril, Colace, vitamin D, iron, Imdur, Tresiba  Soc HX: Lives alone.  Denies substance use.  Allergies   Allergen Reactions    Tramadol Other     \"psychadelic Lights\"   :  Past Medical History:   Diagnosis Date    Atherosclerotic heart disease of native coronary artery without angina pectoris 04/20/2022    Atherosclerotic heart disease of native coronary artery without angina pectoris    Personal history of other diseases of the circulatory system 09/16/2013    History of hypertension     Past Surgical History:   Procedure Laterality Date    CORONARY ARTERY BYPASS GRAFT  08/08/2016    CABG    TOTAL HIP ARTHROPLASTY  08/28/2014    Total Hip Replacement     Family History   Problem Relation Name Age of Onset    No Known Problems Mother      No Known Problems Father        Physical Exam  Vitals and nursing note reviewed.   Constitutional:       General: She is not in acute distress.     Appearance: Normal appearance. She is not ill-appearing or toxic-appearing.      Comments: Pleasant elderly appearing female   HENT:      Right Ear: External ear normal.      Left Ear: External ear normal.      Nose: Congestion and rhinorrhea present.      Mouth/Throat:      Mouth: Mucous membranes are moist.      Pharynx: No posterior oropharyngeal erythema.   Eyes:      Pupils: Pupils are equal, round, and reactive to light.   Cardiovascular:      Rate and Rhythm: Normal rate and regular rhythm.      Pulses: Normal pulses.      Heart sounds: Normal heart sounds.   Pulmonary:      Effort: Pulmonary effort is normal.      Comments: Sounds diminished bilateral bases without adventitious lung sounds  Musculoskeletal:         General: Normal range of motion.      Cervical back: Normal range of motion.   Skin:     General: " Skin is warm and dry.      Capillary Refill: Capillary refill takes less than 2 seconds.      Coloration: Skin is pale.      Comments: Poor skin turgor   Neurological:      Mental Status: She is alert.      Cranial Nerves: No cranial nerve deficit.     VS: As documented in the triage note and EMR flowsheet from this visit were reviewed.    External Records Reviewed: I reviewed recent and relevant outside records including: Reviewed PCP note from yesterday.  Patient advised to increase insulin to 7 units due to her hyperglycemia.  Labs were ordered, she was advised to continue her home medications, increase her amlodipine      Medical Decision Making:   ED Course as of 06/27/24 1546   Thu Jun 27, 2024   1230 Vitals Reviewed: Temp 99.8 Fahrenheit.  Hypertensive. Not tachycardic nor tachypneic. No hypoxia.   [KA]   1328 Patient is 89-year-old female presents to the ED for evaluation of myalgias, cough and nasal congestion in the setting of known COVID-positive exposure.  Physical exam overall reassuring.  Breath sounds are diminished without adventitious lung sounds.  Heart rate is regular.  She has slight temp of 99.8 but does not like taking Tylenol and does not want medication for this.  She has good peripheral pulses.  Normal cap refill.  Oropharynx is injected.  Nursing staff obtain COVID flu and strep during triage.  COVID is positive.  Will obtain chest x-ray to evaluate for COVID-pneumonia because she does have wet sounding cough.  Also asked nursing staff to ambulate patient with pulse ox. [KA]   1333 Walking pulse ox did not fall below 96%. [KA]   1423 Personally viewed chest x-ray.  See no obvious infiltrate.  Patient and her daughters are very concerned about her blood pressure being elevated.  Most recent reading 164/75.  I read over PCP note from yesterday that said if she remains above 160 she should take an extra 2.5 of amlodipine.  I ordered this and they are hesitant to take it unless the cardiologist  and PCP agree that it is a smart decision.  I reached out to both and cardiologist is currently in procedures cannot answer.  I spoke with Dr. aMrks, patient's PCP and he recommended taking the 2.5 of amlodipine.  Discussed this with patient and her daughter; she is agreeable.  We will watch her for 30 to 45 minutes after dosing to monitor for hypotension. [KA]   1442 An interaction check between patient medications and Paxlovid.  Paxlovid is contraindicated when someone is taking amiodarone.  She can take molnupiravir however this medication can be as high as $300.  Discussed this with patient and her daughter.  Through shared decision making decided against starting molnupiravir. [KA]   1500 Recheck BP patient remained 186/80.  Now family is concerned because her blood pressure is remaining high and not responding to medication. [KA]   1526 Dr. Romero in to discuss with patient. Family agreeable to discharge. Advised to follow-up with PCP in the next 2-5 days and return to ER for any new/worsening symptoms. [KA]   1545 Patient blood pressure still elevated.  Called and to patient's room by family again for concern.  Had discussion with them regarding blood pressure.  It may be elevated due to patient's discomfort of the fact that she has COVID.  Recommended to go home and take her normal dose of losartan and her normal dose of metoprolol as scheduled.  Watch it.  We discussed that rather than treat to a number we watch blood pressure and make sure there are no symptoms attached to it.  Advised that if she has elevated blood pressure with blurred vision, double vision, headaches, chest pain, shortness of breath or dizziness then no matter what the number is she should come in and be reevaluated.  We did discuss option for admission however patient would feel more comfortable going home.  Advised that if it anytime she changes her mind she can come back to the ER.  Recommended that she try 1 dose of Tylenol to  see if it helps with her pain and baby then her blood pressure will improve.  Patient agreeable to this.  She will be given 325 mg of Tylenol.  Does not want to take 650. [KA]      ED Course User Index  [KA] Milagros Wolff PA-C         Diagnoses as of 06/27/24 1546   COVID-19   Hypertension, unspecified type      Chronic Medical Conditions Significantly Affecting Care:      Escalation of Care: Appropriate for management       Discussion of Management with Other Providers:  I discussed the patient/results with: Dr. Romero    Counseling: Spoke with the patient and discussed today´s findings, in addition to providing specific details for the plan of care and expected course.  Patient was given the opportunity to ask questions.    Discussed return precautions and importance of follow-up.  Advised to follow-up with PCP.  Advised to return to the ED for changing or worsening symptoms, new symptoms, complaint specific precautions, and precautions listed on the discharge paperwork.  Educated on the common potential side effects of medications prescribed.    I advised the patient that the emergency evaluation and treatment provided today doesn't end their need for medical care. It is very important that they follow-up with their primary care provider or other specialist as instructed.    The plan of care was mutually agreed upon with the patient. The patient and/or family were given the opportunity to ask questions. All questions asked today in the ED were answered to the best of my ability with today's information.    I specifically advised the patient to return to the ED for changing or worsening symptoms, worrisome new symptoms, or for any complaint specific precautions listed on the discharge paperwork.    This patient was cared for in the setting of nationwide stress on resources and staffing.    This report was transcribed using voice recognition software.  Every effort was made to ensure accuracy, however,  inadvertently computerized transcription errors may be present.     Milagros Wolff PA-C  06/27/24 1543

## 2024-06-27 NOTE — DISCHARGE INSTRUCTIONS
You have been seen at a [facility].  Please follow-up with your primary care provider in the next 1 to 2 days for further evaluation and routine follow-up.  Please return to the emergency room if having any worsening symptoms.  Please follow-up with any specialists if discussed during your emergency room stay.

## 2024-06-27 NOTE — ED TRIAGE NOTES
Pt was exposed to covid on Tuesday and Saturday. She is having fevers, chills, sore throat and generalized body aches.    Simple: Patient demonstrates quick and easy understanding

## 2024-06-28 ENCOUNTER — TELEPHONE (OUTPATIENT)
Dept: ENDOCRINOLOGY | Facility: CLINIC | Age: 89
End: 2024-06-28
Payer: MEDICARE

## 2024-06-28 NOTE — TELEPHONE ENCOUNTER
Patient daughter called in states that pt was diagnosed with COVID and her blood sugars are high. Pt has decreased appetite and is not eating much. Pt family is concerned,     Pt is on tresiba 7 Units       Blood sugar last night was 319  This morning 254

## 2024-06-28 NOTE — TELEPHONE ENCOUNTER
I spoke to patient's daughter. Dr. Mendiola spoke to them the other day and did not adjust any of her BP medications. Right now her biggest concern is Shona being COVID+. They know to call for any concerns.

## 2024-06-29 ENCOUNTER — APPOINTMENT (OUTPATIENT)
Dept: CARDIOLOGY | Facility: HOSPITAL | Age: 89
End: 2024-06-29
Payer: MEDICARE

## 2024-06-29 ENCOUNTER — APPOINTMENT (OUTPATIENT)
Dept: RADIOLOGY | Facility: HOSPITAL | Age: 89
End: 2024-06-29
Payer: MEDICARE

## 2024-06-29 ENCOUNTER — HOSPITAL ENCOUNTER (INPATIENT)
Facility: HOSPITAL | Age: 89
End: 2024-06-29
Attending: EMERGENCY MEDICINE | Admitting: INTERNAL MEDICINE
Payer: MEDICARE

## 2024-06-29 DIAGNOSIS — R79.89 TROPONIN LEVEL ELEVATED: ICD-10-CM

## 2024-06-29 DIAGNOSIS — U07.1 COVID: Primary | ICD-10-CM

## 2024-06-29 LAB
ALBUMIN SERPL BCP-MCNC: 3.8 G/DL (ref 3.4–5)
ALP SERPL-CCNC: 86 U/L (ref 33–136)
ALT SERPL W P-5'-P-CCNC: 20 U/L (ref 7–45)
ANION GAP SERPL CALC-SCNC: 15 MMOL/L (ref 10–20)
AST SERPL W P-5'-P-CCNC: 32 U/L (ref 9–39)
BASOPHILS # BLD AUTO: 0.01 X10*3/UL (ref 0–0.1)
BASOPHILS NFR BLD AUTO: 0.1 %
BILIRUB SERPL-MCNC: 0.8 MG/DL (ref 0–1.2)
BNP SERPL-MCNC: 151 PG/ML (ref 0–99)
BUN SERPL-MCNC: 39 MG/DL (ref 6–23)
CALCIUM SERPL-MCNC: 9.9 MG/DL (ref 8.6–10.3)
CARDIAC TROPONIN I PNL SERPL HS: 59 NG/L (ref 0–13)
CARDIAC TROPONIN I PNL SERPL HS: 62 NG/L (ref 0–13)
CHLORIDE SERPL-SCNC: 99 MMOL/L (ref 98–107)
CO2 SERPL-SCNC: 23 MMOL/L (ref 21–32)
CREAT SERPL-MCNC: 1.86 MG/DL (ref 0.5–1.05)
EGFRCR SERPLBLD CKD-EPI 2021: 25 ML/MIN/1.73M*2
EOSINOPHIL # BLD AUTO: 0 X10*3/UL (ref 0–0.4)
EOSINOPHIL NFR BLD AUTO: 0 %
ERYTHROCYTE [DISTWIDTH] IN BLOOD BY AUTOMATED COUNT: 13.3 % (ref 11.5–14.5)
GLUCOSE SERPL-MCNC: 169 MG/DL (ref 74–99)
HCT VFR BLD AUTO: 34.5 % (ref 36–46)
HGB BLD-MCNC: 11.4 G/DL (ref 12–16)
IMM GRANULOCYTES # BLD AUTO: 0.01 X10*3/UL (ref 0–0.5)
IMM GRANULOCYTES NFR BLD AUTO: 0.1 % (ref 0–0.9)
LYMPHOCYTES # BLD AUTO: 1.08 X10*3/UL (ref 0.8–3)
LYMPHOCYTES NFR BLD AUTO: 16.1 %
MCH RBC QN AUTO: 31.1 PG (ref 26–34)
MCHC RBC AUTO-ENTMCNC: 33 G/DL (ref 32–36)
MCV RBC AUTO: 94 FL (ref 80–100)
MONOCYTES # BLD AUTO: 0.75 X10*3/UL (ref 0.05–0.8)
MONOCYTES NFR BLD AUTO: 11.2 %
NEUTROPHILS # BLD AUTO: 4.85 X10*3/UL (ref 1.6–5.5)
NEUTROPHILS NFR BLD AUTO: 72.5 %
NRBC BLD-RTO: 0 /100 WBCS (ref 0–0)
PLATELET # BLD AUTO: 174 X10*3/UL (ref 150–450)
POTASSIUM SERPL-SCNC: 4.2 MMOL/L (ref 3.5–5.3)
PROT SERPL-MCNC: 6.9 G/DL (ref 6.4–8.2)
RBC # BLD AUTO: 3.66 X10*6/UL (ref 4–5.2)
SODIUM SERPL-SCNC: 133 MMOL/L (ref 136–145)
WBC # BLD AUTO: 6.7 X10*3/UL (ref 4.4–11.3)

## 2024-06-29 PROCEDURE — 36415 COLL VENOUS BLD VENIPUNCTURE: CPT | Performed by: EMERGENCY MEDICINE

## 2024-06-29 PROCEDURE — 1200000002 HC GENERAL ROOM WITH TELEMETRY DAILY

## 2024-06-29 PROCEDURE — 94640 AIRWAY INHALATION TREATMENT: CPT

## 2024-06-29 PROCEDURE — 2500000001 HC RX 250 WO HCPCS SELF ADMINISTERED DRUGS (ALT 637 FOR MEDICARE OP): Performed by: INTERNAL MEDICINE

## 2024-06-29 PROCEDURE — 84484 ASSAY OF TROPONIN QUANT: CPT | Mod: 91 | Performed by: EMERGENCY MEDICINE

## 2024-06-29 PROCEDURE — 71045 X-RAY EXAM CHEST 1 VIEW: CPT

## 2024-06-29 PROCEDURE — 93005 ELECTROCARDIOGRAM TRACING: CPT

## 2024-06-29 PROCEDURE — 3E0333Z INTRODUCTION OF ANTI-INFLAMMATORY INTO PERIPHERAL VEIN, PERCUTANEOUS APPROACH: ICD-10-PCS | Performed by: FAMILY MEDICINE

## 2024-06-29 PROCEDURE — 71045 X-RAY EXAM CHEST 1 VIEW: CPT | Performed by: STUDENT IN AN ORGANIZED HEALTH CARE EDUCATION/TRAINING PROGRAM

## 2024-06-29 PROCEDURE — 83880 ASSAY OF NATRIURETIC PEPTIDE: CPT | Performed by: EMERGENCY MEDICINE

## 2024-06-29 PROCEDURE — 80053 COMPREHEN METABOLIC PANEL: CPT | Performed by: EMERGENCY MEDICINE

## 2024-06-29 PROCEDURE — 85025 COMPLETE CBC W/AUTO DIFF WBC: CPT | Performed by: EMERGENCY MEDICINE

## 2024-06-29 PROCEDURE — 2500000004 HC RX 250 GENERAL PHARMACY W/ HCPCS (ALT 636 FOR OP/ED): Performed by: EMERGENCY MEDICINE

## 2024-06-29 PROCEDURE — 99285 EMERGENCY DEPT VISIT HI MDM: CPT

## 2024-06-29 PROCEDURE — 84484 ASSAY OF TROPONIN QUANT: CPT | Performed by: EMERGENCY MEDICINE

## 2024-06-29 RX ORDER — CLOPIDOGREL BISULFATE 75 MG/1
75 TABLET ORAL DAILY
Status: DISPENSED | OUTPATIENT
Start: 2024-06-30

## 2024-06-29 RX ORDER — ONDANSETRON HYDROCHLORIDE 2 MG/ML
4 INJECTION, SOLUTION INTRAVENOUS EVERY 6 HOURS PRN
Status: DISCONTINUED | OUTPATIENT
Start: 2024-06-29 | End: 2024-06-29

## 2024-06-29 RX ORDER — NITROGLYCERIN 0.4 MG/1
0.4 TABLET SUBLINGUAL EVERY 5 MIN PRN
Status: ACTIVE | OUTPATIENT
Start: 2024-06-29

## 2024-06-29 RX ORDER — ACETAMINOPHEN 325 MG/1
650 TABLET ORAL EVERY 6 HOURS PRN
Status: DISCONTINUED | OUTPATIENT
Start: 2024-06-29 | End: 2024-06-29

## 2024-06-29 RX ORDER — GUAIFENESIN 600 MG/1
600 TABLET, EXTENDED RELEASE ORAL EVERY 12 HOURS PRN
Status: ACTIVE | OUTPATIENT
Start: 2024-06-29

## 2024-06-29 RX ORDER — ISOSORBIDE MONONITRATE 30 MG/1
60 TABLET, EXTENDED RELEASE ORAL DAILY
Status: DISPENSED | OUTPATIENT
Start: 2024-06-30

## 2024-06-29 RX ORDER — IPRATROPIUM BROMIDE AND ALBUTEROL SULFATE 2.5; .5 MG/3ML; MG/3ML
3 SOLUTION RESPIRATORY (INHALATION)
Status: DISCONTINUED | OUTPATIENT
Start: 2024-06-29 | End: 2024-06-29

## 2024-06-29 RX ORDER — PANTOPRAZOLE SODIUM 40 MG/10ML
40 INJECTION, POWDER, LYOPHILIZED, FOR SOLUTION INTRAVENOUS
Status: ACTIVE | OUTPATIENT
Start: 2024-06-30

## 2024-06-29 RX ORDER — METOPROLOL SUCCINATE 25 MG/1
12.5 TABLET, EXTENDED RELEASE ORAL NIGHTLY
Status: DISPENSED | OUTPATIENT
Start: 2024-06-29

## 2024-06-29 RX ORDER — IPRATROPIUM BROMIDE AND ALBUTEROL SULFATE 2.5; .5 MG/3ML; MG/3ML
3 SOLUTION RESPIRATORY (INHALATION) EVERY 2 HOUR PRN
Status: DISCONTINUED | OUTPATIENT
Start: 2024-06-29 | End: 2024-06-30

## 2024-06-29 RX ORDER — TALC
3 POWDER (GRAM) TOPICAL NIGHTLY PRN
Status: ACTIVE | OUTPATIENT
Start: 2024-06-29

## 2024-06-29 RX ORDER — ONDANSETRON 4 MG/1
4 TABLET, FILM COATED ORAL EVERY 8 HOURS PRN
Status: ACTIVE | OUTPATIENT
Start: 2024-06-29

## 2024-06-29 RX ORDER — AMIODARONE HYDROCHLORIDE 200 MG/1
100 TABLET ORAL DAILY
Status: DISPENSED | OUTPATIENT
Start: 2024-06-30

## 2024-06-29 RX ORDER — IPRATROPIUM BROMIDE AND ALBUTEROL SULFATE 2.5; .5 MG/3ML; MG/3ML
3 SOLUTION RESPIRATORY (INHALATION)
Status: DISCONTINUED | OUTPATIENT
Start: 2024-06-30 | End: 2024-06-30

## 2024-06-29 RX ORDER — ATORVASTATIN CALCIUM 80 MG/1
80 TABLET, FILM COATED ORAL NIGHTLY
Status: DISPENSED | OUTPATIENT
Start: 2024-06-29

## 2024-06-29 RX ORDER — LOSARTAN POTASSIUM 50 MG/1
50 TABLET ORAL 2 TIMES DAILY
Status: DISPENSED | OUTPATIENT
Start: 2024-06-29

## 2024-06-29 RX ORDER — PANTOPRAZOLE SODIUM 40 MG/1
40 TABLET, DELAYED RELEASE ORAL
Status: DISPENSED | OUTPATIENT
Start: 2024-06-30

## 2024-06-29 RX ORDER — ACETAMINOPHEN 325 MG/1
650 TABLET ORAL EVERY 4 HOURS PRN
Status: ACTIVE | OUTPATIENT
Start: 2024-06-29

## 2024-06-29 RX ORDER — DEXAMETHASONE 6 MG/1
6 TABLET ORAL ONCE
Status: COMPLETED | OUTPATIENT
Start: 2024-06-29 | End: 2024-06-29

## 2024-06-29 RX ORDER — PANTOPRAZOLE SODIUM 40 MG/1
40 TABLET, DELAYED RELEASE ORAL
Status: DISCONTINUED | OUTPATIENT
Start: 2024-06-30 | End: 2024-06-29

## 2024-06-29 RX ORDER — ONDANSETRON HYDROCHLORIDE 2 MG/ML
4 INJECTION, SOLUTION INTRAVENOUS EVERY 8 HOURS PRN
Status: ACTIVE | OUTPATIENT
Start: 2024-06-29

## 2024-06-29 RX ORDER — POLYETHYLENE GLYCOL 3350 17 G/17G
17 POWDER, FOR SOLUTION ORAL DAILY PRN
Status: ACTIVE | OUTPATIENT
Start: 2024-06-29

## 2024-06-29 RX ORDER — ACETAMINOPHEN 650 MG/1
650 SUPPOSITORY RECTAL EVERY 4 HOURS PRN
Status: ACTIVE | OUTPATIENT
Start: 2024-06-29

## 2024-06-29 RX ORDER — TALC
3 POWDER (GRAM) TOPICAL NIGHTLY PRN
Status: DISCONTINUED | OUTPATIENT
Start: 2024-06-29 | End: 2024-06-29

## 2024-06-29 RX ORDER — FUROSEMIDE 10 MG/ML
40 INJECTION INTRAMUSCULAR; INTRAVENOUS EVERY 24 HOURS
Status: DISCONTINUED | OUTPATIENT
Start: 2024-06-30 | End: 2024-06-30

## 2024-06-29 RX ORDER — ACETAMINOPHEN 160 MG/5ML
650 SOLUTION ORAL EVERY 4 HOURS PRN
Status: ACTIVE | OUTPATIENT
Start: 2024-06-29

## 2024-06-29 RX ORDER — FUROSEMIDE 20 MG/1
20 TABLET ORAL DAILY
Status: DISCONTINUED | OUTPATIENT
Start: 2024-06-30 | End: 2024-06-29

## 2024-06-29 RX ORDER — AMLODIPINE BESYLATE 2.5 MG/1
2.5 TABLET ORAL DAILY
Status: DISPENSED | OUTPATIENT
Start: 2024-06-30

## 2024-06-29 RX ORDER — ACETAMINOPHEN 325 MG/1
975 TABLET ORAL ONCE
Status: COMPLETED | OUTPATIENT
Start: 2024-06-29 | End: 2024-06-29

## 2024-06-29 RX ADMIN — METOPROLOL SUCCINATE 12.5 MG: 25 TABLET, EXTENDED RELEASE ORAL at 23:53

## 2024-06-29 RX ADMIN — DEXAMETHASONE 6 MG: 6 TABLET ORAL at 19:21

## 2024-06-29 RX ADMIN — APIXABAN 2.5 MG: 2.5 TABLET, FILM COATED ORAL at 23:53

## 2024-06-29 RX ADMIN — IPRATROPIUM BROMIDE AND ALBUTEROL SULFATE 3 ML: 2.5; .5 SOLUTION RESPIRATORY (INHALATION) at 19:56

## 2024-06-29 RX ADMIN — ATORVASTATIN CALCIUM 80 MG: 80 TABLET, FILM COATED ORAL at 23:53

## 2024-06-29 RX ADMIN — LOSARTAN POTASSIUM 50 MG: 50 TABLET, FILM COATED ORAL at 23:53

## 2024-06-29 RX ADMIN — ACETAMINOPHEN 975 MG: 325 TABLET ORAL at 16:44

## 2024-06-29 ASSESSMENT — PAIN - FUNCTIONAL ASSESSMENT: PAIN_FUNCTIONAL_ASSESSMENT: 0-10

## 2024-06-29 ASSESSMENT — PAIN SCALES - GENERAL: PAINLEVEL_OUTOF10: 0 - NO PAIN

## 2024-06-29 NOTE — SIGNIFICANT EVENT
06/29/24 1903   Patient Evaluation Program   Pulmonary Status 0   Surgical Status 0   Chest X-Ray 2   Respiratory Pattern 0   Mental Status 0   Breath Sounds 4   Cough 1   Level of Activity 1   Oxygen Required for Sp02 Greater Than or Equal to 92% 1   Respiratory Acuity Score 9   Triage Level Triage - 4, Score of 4-10   Frequency TID and Q2 PRN (intermittant wheezing,  TRIAGE - 3&4)

## 2024-06-29 NOTE — ED PROVIDER NOTES
HPI   Chief Complaint   Patient presents with    Leg Swelling     BUE    covid positive       HPI  Patient is a 89-year-old female with a past medical history significant for CHF and atrial fibrillation currently on Eliquis, chronic lower extremity edema, hypertension,, CAD status post CABG, CKD with multiple recent hospitalizations to the hospital for fluid overload who presents emergency room with shortness of breath, hypoxia.  She has been in and out of the hospital according to son recently for heart issues and lower extremity swelling.  They have been increasing her Lasix but despite this she is continuing to experience leg swelling without calf pain.  She has not been eating or drinking very much because she was diagnosed with COVID on the 27th and since then has felt weak.  She has no chest pain and does have a cough but it is nonproductive.  They were told to bring her back to the hospital if she had any signs of hypoxia on pulse ox and they noted up steady pulse ox of 89% so they presented to the the ED as they had been instructed.  Patient denies any other symptoms at this time but presents with a fever.  She has not taken any antipyretics that she does not like Tylenol.  Denies allergy to Tylenol.  She is vaccinated for COVID but unclear if she had a booster.      PMHx: As above  PSHx: Denies per  FamilyHx: Denies pertinent  SocialHx: Denies  Allergies:  Medications: See Medication Reconciliation     ROS  As above otherwise denies      Physical Exam    GENERAL: Awake and Alert, No Acute Distress but underweight and chronically ill-appearing  HEENT: AT/NC, PERRL, EOMI, Normal Oropharynx, No Signs of Dehydration  NECK: Normal Inspection, No JVD  CARDIOVASCULAR: RRR, No M/R/G  RESPIRATORY: CTA Bilaterally, No Wheezes, Rales or Rhonchi, Chest Wall Non-tender  ABDOMEN: Soft, non-tender abdomen, Normal Bowel Sounds, No Distention  BACK: No CVA Tenderness  SKIN: Normal Color, Warm, Dry, No Rashes   EXTREMITIES:  Non-Tender, Full ROM, lateral lower extremity pedal Edema  NEURO: A&O x 3, Normal Motor and Sensation, Normal Mood and Affect    Nursing Assessment and Vitals Reviewed    EKG showed a sinus rhythm at 76 bpm.  There are occasional PACs.  No ischemic ST changes.  There are T wave inversions in lead V6.    Medical Decision  Patient is a 89-year-old female with a past medical history significant for CHF and atrial fibrillation currently on Eliquis, chronic lower extremity edema, hypertension,, CAD status post CABG, CKD with multiple recent hospitalizations to the hospital for fluid overload who presents emergency room with shortness of breath, hypoxia.  She has been in and out of the hospital according to son recently for heart issues and lower extremity swelling.  They have been increasing her Lasix but despite this she is continuing to experience leg swelling without calf pain.  She has not been eating or drinking very much because she was diagnosed with COVID on the 27th and since then has felt weak.  She has no chest pain and does have a cough but it is nonproductive.  They were told to bring her back to the hospital if she had any signs of hypoxia on pulse ox and they noted up steady pulse ox of 89% so they presented to the the ED as they had been instructed.  Patient denies any other symptoms at this time but presents with a fever.  She has not taken any antipyretics that she does not like Tylenol.  Denies allergy to Tylenol.  She is vaccinated for COVID but unclear if she had a booster.    On evaluation she is in no acute distress but does show signs of chronic illness and underweight presentation.  She has lower extremity edema without tenderness to palpation to the calf.  She is saturating 91% on room air while at rest.  She is febrile and is started on Tylenol.  Will perform workup including x-rays and imaging.  Low suspicion for PE at this time as she is anticoagulated and without actual chest pain.  Will  continue to monitor and anticipate admission.  Will start patient on Decadron for hypoxia associated with COVID.    Workup for patient included labs that revealed hyperglycemia without signs of acidosis.  BUN and creatinine are elevated similar to prior.  Troponin is 59 the patient has been experiencing elevations in the past several months although it is increased from prior and will repeat for trending.  She remains without chest pain here in the ED.  She has no leukocytosis and only mild anemia improved from prior.  Chest x-ray showed some mild atelectasis without evidence of pneumonia.  Patient remained in stable condition while in the ED on 2 L nasal cannula for comfort as she was 91% at rest.  She is started on Decadron and admitted for further workup and management.                            Brandon Coma Scale Score: 15                     Patient History   Past Medical History:   Diagnosis Date    Atherosclerotic heart disease of native coronary artery without angina pectoris 04/20/2022    Atherosclerotic heart disease of native coronary artery without angina pectoris    Personal history of other diseases of the circulatory system 09/16/2013    History of hypertension     Past Surgical History:   Procedure Laterality Date    CORONARY ARTERY BYPASS GRAFT  08/08/2016    CABG    TOTAL HIP ARTHROPLASTY  08/28/2014    Total Hip Replacement     Family History   Problem Relation Name Age of Onset    No Known Problems Mother      No Known Problems Father       Social History     Tobacco Use    Smoking status: Never    Smokeless tobacco: Never   Substance Use Topics    Alcohol use: Not on file    Drug use: Not on file       Physical Exam   ED Triage Vitals [06/29/24 1455]   Temperature Heart Rate Respirations BP   (!) 38.1 °C (100.5 °F) 74 20 137/56      Pulse Ox Temp Source Heart Rate Source Patient Position   (!) 93 % Temporal Monitor Sitting      BP Location FiO2 (%)     Left arm --       Physical Exam    ED  Course & MDM   Diagnoses as of 06/29/24 1844   COVID   Troponin level elevated       Medical Decision Making      Procedure  Procedures     Lexie Madrid MD  06/29/24 4197

## 2024-06-29 NOTE — ED TRIAGE NOTES
Pt was evaluated in ED and diagnosed with COVID. Family was told to come to ED if oxygen level dropped. Family member states it was 89% at home. Pt also c/o BLE swelling.

## 2024-06-30 VITALS
HEART RATE: 60 BPM | HEIGHT: 63 IN | BODY MASS INDEX: 19.67 KG/M2 | TEMPERATURE: 97.7 F | WEIGHT: 111 LBS | RESPIRATION RATE: 21 BRPM | DIASTOLIC BLOOD PRESSURE: 82 MMHG | SYSTOLIC BLOOD PRESSURE: 124 MMHG | OXYGEN SATURATION: 97 %

## 2024-06-30 LAB
APPEARANCE UR: ABNORMAL
BILIRUB UR STRIP.AUTO-MCNC: NEGATIVE MG/DL
COLOR UR: YELLOW
EST. AVERAGE GLUCOSE BLD GHB EST-MCNC: 197 MG/DL
GLUCOSE BLD MANUAL STRIP-MCNC: 207 MG/DL (ref 74–99)
GLUCOSE BLD MANUAL STRIP-MCNC: 273 MG/DL (ref 74–99)
GLUCOSE BLD MANUAL STRIP-MCNC: 298 MG/DL (ref 74–99)
GLUCOSE BLD MANUAL STRIP-MCNC: 349 MG/DL (ref 74–99)
GLUCOSE UR STRIP.AUTO-MCNC: ABNORMAL MG/DL
HBA1C MFR BLD: 8.5 %
HOLD SPECIMEN: NORMAL
KETONES UR STRIP.AUTO-MCNC: NEGATIVE MG/DL
LEUKOCYTE ESTERASE UR QL STRIP.AUTO: ABNORMAL
MUCOUS THREADS #/AREA URNS AUTO: ABNORMAL /LPF
NITRITE UR QL STRIP.AUTO: NEGATIVE
PH UR STRIP.AUTO: 5.5 [PH]
PROT UR STRIP.AUTO-MCNC: ABNORMAL MG/DL
RBC # UR STRIP.AUTO: ABNORMAL /UL
RBC #/AREA URNS AUTO: ABNORMAL /HPF
SP GR UR STRIP.AUTO: 1.02
SQUAMOUS #/AREA URNS AUTO: ABNORMAL /HPF
UROBILINOGEN UR STRIP.AUTO-MCNC: NORMAL MG/DL
WBC #/AREA URNS AUTO: ABNORMAL /HPF

## 2024-06-30 PROCEDURE — 83036 HEMOGLOBIN GLYCOSYLATED A1C: CPT | Mod: AHULAB | Performed by: INTERNAL MEDICINE

## 2024-06-30 PROCEDURE — 82947 ASSAY GLUCOSE BLOOD QUANT: CPT

## 2024-06-30 PROCEDURE — 81001 URINALYSIS AUTO W/SCOPE: CPT | Performed by: EMERGENCY MEDICINE

## 2024-06-30 PROCEDURE — XW033E5 INTRODUCTION OF REMDESIVIR ANTI-INFECTIVE INTO PERIPHERAL VEIN, PERCUTANEOUS APPROACH, NEW TECHNOLOGY GROUP 5: ICD-10-PCS | Performed by: FAMILY MEDICINE

## 2024-06-30 PROCEDURE — 2500000002 HC RX 250 W HCPCS SELF ADMINISTERED DRUGS (ALT 637 FOR MEDICARE OP, ALT 636 FOR OP/ED): Performed by: INTERNAL MEDICINE

## 2024-06-30 PROCEDURE — 36415 COLL VENOUS BLD VENIPUNCTURE: CPT | Performed by: INTERNAL MEDICINE

## 2024-06-30 PROCEDURE — 1200000002 HC GENERAL ROOM WITH TELEMETRY DAILY

## 2024-06-30 PROCEDURE — 94640 AIRWAY INHALATION TREATMENT: CPT

## 2024-06-30 PROCEDURE — 87086 URINE CULTURE/COLONY COUNT: CPT | Mod: AHULAB | Performed by: EMERGENCY MEDICINE

## 2024-06-30 PROCEDURE — 9420000001 HC RT PATIENT EDUCATION 5 MIN

## 2024-06-30 PROCEDURE — 2500000001 HC RX 250 WO HCPCS SELF ADMINISTERED DRUGS (ALT 637 FOR MEDICARE OP): Performed by: INTERNAL MEDICINE

## 2024-06-30 PROCEDURE — 99222 1ST HOSP IP/OBS MODERATE 55: CPT | Performed by: INTERNAL MEDICINE

## 2024-06-30 PROCEDURE — 94668 MNPJ CHEST WALL SBSQ: CPT

## 2024-06-30 PROCEDURE — 2500000004 HC RX 250 GENERAL PHARMACY W/ HCPCS (ALT 636 FOR OP/ED): Performed by: INTERNAL MEDICINE

## 2024-06-30 PROCEDURE — 2500000002 HC RX 250 W HCPCS SELF ADMINISTERED DRUGS (ALT 637 FOR MEDICARE OP, ALT 636 FOR OP/ED): Performed by: EMERGENCY MEDICINE

## 2024-06-30 PROCEDURE — 94667 MNPJ CHEST WALL 1ST: CPT

## 2024-06-30 PROCEDURE — 2500000005 HC RX 250 GENERAL PHARMACY W/O HCPCS: Mod: JZ | Performed by: INTERNAL MEDICINE

## 2024-06-30 RX ORDER — FUROSEMIDE 20 MG/1
20 TABLET ORAL
Status: DISPENSED | OUTPATIENT
Start: 2024-06-30

## 2024-06-30 RX ORDER — INSULIN LISPRO 100 [IU]/ML
0-5 INJECTION, SOLUTION INTRAVENOUS; SUBCUTANEOUS
Status: DISPENSED | OUTPATIENT
Start: 2024-06-30

## 2024-06-30 RX ORDER — INSULIN GLARGINE 100 [IU]/ML
10 INJECTION, SOLUTION SUBCUTANEOUS NIGHTLY
Status: DISPENSED | OUTPATIENT
Start: 2024-06-30

## 2024-06-30 RX ORDER — CEFTRIAXONE 1 G/50ML
1 INJECTION, SOLUTION INTRAVENOUS DAILY
Status: DISCONTINUED | OUTPATIENT
Start: 2024-06-30 | End: 2024-06-30

## 2024-06-30 RX ORDER — ALBUTEROL SULFATE 90 UG/1
2 AEROSOL, METERED RESPIRATORY (INHALATION)
Status: ACTIVE | OUTPATIENT
Start: 2024-06-30

## 2024-06-30 RX ORDER — DEXTROSE 50 % IN WATER (D50W) INTRAVENOUS SYRINGE
25
Status: ACTIVE | OUTPATIENT
Start: 2024-06-30

## 2024-06-30 RX ORDER — DEXTROSE 50 % IN WATER (D50W) INTRAVENOUS SYRINGE
12.5
Status: ACTIVE | OUTPATIENT
Start: 2024-06-30

## 2024-06-30 RX ADMIN — REMDESIVIR 200 MG: 100 INJECTION, POWDER, LYOPHILIZED, FOR SOLUTION INTRAVENOUS at 12:21

## 2024-06-30 RX ADMIN — TIOTROPIUM BROMIDE INHALATION SPRAY 2 PUFF: 3.12 SPRAY, METERED RESPIRATORY (INHALATION) at 08:29

## 2024-06-30 RX ADMIN — INSULIN GLARGINE 10 UNITS: 100 INJECTION, SOLUTION SUBCUTANEOUS at 22:04

## 2024-06-30 RX ADMIN — ALBUTEROL SULFATE 2 PUFF: 90 AEROSOL, METERED RESPIRATORY (INHALATION) at 14:10

## 2024-06-30 RX ADMIN — LOSARTAN POTASSIUM 50 MG: 50 TABLET, FILM COATED ORAL at 21:46

## 2024-06-30 RX ADMIN — INSULIN LISPRO 4 UNITS: 100 INJECTION, SOLUTION INTRAVENOUS; SUBCUTANEOUS at 16:53

## 2024-06-30 RX ADMIN — ISOSORBIDE MONONITRATE 60 MG: 30 TABLET, EXTENDED RELEASE ORAL at 10:42

## 2024-06-30 RX ADMIN — ALBUTEROL SULFATE 2 PUFF: 90 AEROSOL, METERED RESPIRATORY (INHALATION) at 21:08

## 2024-06-30 RX ADMIN — FUROSEMIDE 20 MG: 20 TABLET ORAL at 12:58

## 2024-06-30 RX ADMIN — APIXABAN 2.5 MG: 2.5 TABLET, FILM COATED ORAL at 10:42

## 2024-06-30 RX ADMIN — APIXABAN 2.5 MG: 2.5 TABLET, FILM COATED ORAL at 21:46

## 2024-06-30 RX ADMIN — AMIODARONE HYDROCHLORIDE 100 MG: 200 TABLET ORAL at 10:43

## 2024-06-30 RX ADMIN — ATORVASTATIN CALCIUM 80 MG: 80 TABLET, FILM COATED ORAL at 21:46

## 2024-06-30 RX ADMIN — ALBUTEROL SULFATE 2 PUFF: 90 AEROSOL, METERED RESPIRATORY (INHALATION) at 08:25

## 2024-06-30 RX ADMIN — PANTOPRAZOLE SODIUM 40 MG: 40 TABLET, DELAYED RELEASE ORAL at 06:07

## 2024-06-30 RX ADMIN — INSULIN LISPRO 3 UNITS: 100 INJECTION, SOLUTION INTRAVENOUS; SUBCUTANEOUS at 12:29

## 2024-06-30 RX ADMIN — METOPROLOL SUCCINATE 12.5 MG: 25 TABLET, EXTENDED RELEASE ORAL at 21:46

## 2024-06-30 RX ADMIN — CLOPIDOGREL 75 MG: 75 TABLET ORAL at 10:42

## 2024-06-30 RX ADMIN — AMLODIPINE BESYLATE 2.5 MG: 2.5 TABLET ORAL at 10:42

## 2024-06-30 SDOH — SOCIAL STABILITY: SOCIAL INSECURITY: ARE THERE ANY APPARENT SIGNS OF INJURIES/BEHAVIORS THAT COULD BE RELATED TO ABUSE/NEGLECT?: NO

## 2024-06-30 SDOH — SOCIAL STABILITY: SOCIAL INSECURITY: HAVE YOU HAD ANY THOUGHTS OF HARMING ANYONE ELSE?: NO

## 2024-06-30 SDOH — SOCIAL STABILITY: SOCIAL INSECURITY: HAVE YOU HAD THOUGHTS OF HARMING ANYONE ELSE?: NO

## 2024-06-30 SDOH — SOCIAL STABILITY: SOCIAL INSECURITY: DO YOU FEEL ANYONE HAS EXPLOITED OR TAKEN ADVANTAGE OF YOU FINANCIALLY OR OF YOUR PERSONAL PROPERTY?: NO

## 2024-06-30 SDOH — SOCIAL STABILITY: SOCIAL INSECURITY: ABUSE: ADULT

## 2024-06-30 SDOH — SOCIAL STABILITY: SOCIAL INSECURITY: WERE YOU ABLE TO COMPLETE ALL THE BEHAVIORAL HEALTH SCREENINGS?: YES

## 2024-06-30 SDOH — SOCIAL STABILITY: SOCIAL INSECURITY: DOES ANYONE TRY TO KEEP YOU FROM HAVING/CONTACTING OTHER FRIENDS OR DOING THINGS OUTSIDE YOUR HOME?: NO

## 2024-06-30 SDOH — SOCIAL STABILITY: SOCIAL INSECURITY: ARE YOU OR HAVE YOU BEEN THREATENED OR ABUSED PHYSICALLY, EMOTIONALLY, OR SEXUALLY BY ANYONE?: NO

## 2024-06-30 SDOH — SOCIAL STABILITY: SOCIAL INSECURITY: DO YOU FEEL UNSAFE GOING BACK TO THE PLACE WHERE YOU ARE LIVING?: NO

## 2024-06-30 SDOH — SOCIAL STABILITY: SOCIAL INSECURITY: HAS ANYONE EVER THREATENED TO HURT YOUR FAMILY OR YOUR PETS?: NO

## 2024-06-30 ASSESSMENT — LIFESTYLE VARIABLES
PRESCIPTION_ABUSE_PAST_12_MONTHS: NO
AUDIT-C TOTAL SCORE: 0
SKIP TO QUESTIONS 9-10: 1
HOW OFTEN DO YOU HAVE A DRINK CONTAINING ALCOHOL: NEVER
HOW OFTEN DO YOU HAVE 6 OR MORE DRINKS ON ONE OCCASION: NEVER
SUBSTANCE_ABUSE_PAST_12_MONTHS: NO
AUDIT-C TOTAL SCORE: 0
HOW MANY STANDARD DRINKS CONTAINING ALCOHOL DO YOU HAVE ON A TYPICAL DAY: PATIENT DOES NOT DRINK

## 2024-06-30 ASSESSMENT — COGNITIVE AND FUNCTIONAL STATUS - GENERAL
PERSONAL GROOMING: A LOT
TOILETING: A LOT
CLIMB 3 TO 5 STEPS WITH RAILING: A LOT
DRESSING REGULAR LOWER BODY CLOTHING: A LOT
DAILY ACTIVITIY SCORE: 13
EATING MEALS: A LITTLE
MOBILITY SCORE: 16
MOVING TO AND FROM BED TO CHAIR: A LITTLE
TURNING FROM BACK TO SIDE WHILE IN FLAT BAD: A LITTLE
TURNING FROM BACK TO SIDE WHILE IN FLAT BAD: A LITTLE
STANDING UP FROM CHAIR USING ARMS: A LITTLE
DRESSING REGULAR LOWER BODY CLOTHING: A LITTLE
DRESSING REGULAR LOWER BODY CLOTHING: A LOT
WALKING IN HOSPITAL ROOM: A LOT
MOBILITY SCORE: 16
MOVING TO AND FROM BED TO CHAIR: A LITTLE
PERSONAL GROOMING: A LOT
HELP NEEDED FOR BATHING: A LOT
PATIENT BASELINE BEDBOUND: NO
TURNING FROM BACK TO SIDE WHILE IN FLAT BAD: A LITTLE
MOVING FROM LYING ON BACK TO SITTING ON SIDE OF FLAT BED WITH BEDRAILS: A LITTLE
TOILETING: A LOT
STANDING UP FROM CHAIR USING ARMS: A LITTLE
DRESSING REGULAR UPPER BODY CLOTHING: A LITTLE
HELP NEEDED FOR BATHING: A LITTLE
CLIMB 3 TO 5 STEPS WITH RAILING: A LOT
STANDING UP FROM CHAIR USING ARMS: A LITTLE
CLIMB 3 TO 5 STEPS WITH RAILING: A LOT
MOVING FROM LYING ON BACK TO SITTING ON SIDE OF FLAT BED WITH BEDRAILS: A LITTLE
TOILETING: A LITTLE
DAILY ACTIVITIY SCORE: 18
DRESSING REGULAR UPPER BODY CLOTHING: A LOT
MOVING FROM LYING ON BACK TO SITTING ON SIDE OF FLAT BED WITH BEDRAILS: A LITTLE
HELP NEEDED FOR BATHING: A LOT
MOBILITY SCORE: 17
WALKING IN HOSPITAL ROOM: A LITTLE
WALKING IN HOSPITAL ROOM: A LOT
MOVING TO AND FROM BED TO CHAIR: A LITTLE
PERSONAL GROOMING: A LITTLE
DRESSING REGULAR UPPER BODY CLOTHING: A LOT
DAILY ACTIVITIY SCORE: 13
EATING MEALS: A LITTLE
EATING MEALS: A LITTLE

## 2024-06-30 ASSESSMENT — ACTIVITIES OF DAILY LIVING (ADL)
JUDGMENT_ADEQUATE_SAFELY_COMPLETE_DAILY_ACTIVITIES: NO
WALKS IN HOME: NEEDS ASSISTANCE
BATHING: NEEDS ASSISTANCE
DRESSING YOURSELF: NEEDS ASSISTANCE
FEEDING YOURSELF: NEEDS ASSISTANCE
HEARING - RIGHT EAR: FUNCTIONAL
ASSISTIVE_DEVICE: WALKER
WALKS IN HOME: NEEDS ASSISTANCE
BATHING: NEEDS ASSISTANCE
TOILETING: NEEDS ASSISTANCE
TOILETING: NEEDS ASSISTANCE
GROOMING: NEEDS ASSISTANCE
ADEQUATE_TO_COMPLETE_ADL: NO
ADEQUATE_TO_COMPLETE_ADL: NO
GROOMING: NEEDS ASSISTANCE
ASSISTIVE_DEVICE: WALKER
LACK_OF_TRANSPORTATION: NO
HEARING - LEFT EAR: FUNCTIONAL
PATIENT'S MEMORY ADEQUATE TO SAFELY COMPLETE DAILY ACTIVITIES?: NO
PATIENT'S MEMORY ADEQUATE TO SAFELY COMPLETE DAILY ACTIVITIES?: NO
JUDGMENT_ADEQUATE_SAFELY_COMPLETE_DAILY_ACTIVITIES: NO
DRESSING YOURSELF: NEEDS ASSISTANCE
HEARING - RIGHT EAR: FUNCTIONAL
HEARING - LEFT EAR: FUNCTIONAL
FEEDING YOURSELF: NEEDS ASSISTANCE

## 2024-06-30 ASSESSMENT — PATIENT HEALTH QUESTIONNAIRE - PHQ9
SUM OF ALL RESPONSES TO PHQ9 QUESTIONS 1 & 2: 0
2. FEELING DOWN, DEPRESSED OR HOPELESS: NOT AT ALL
1. LITTLE INTEREST OR PLEASURE IN DOING THINGS: NOT AT ALL

## 2024-06-30 ASSESSMENT — PAIN SCALES - GENERAL
PAINLEVEL_OUTOF10: 0 - NO PAIN

## 2024-06-30 NOTE — CONSULTS
INFECTIOUS DISEASES CONSULT NOTE    Referring Physician: Elieser Marks  Reason For Consult: COVID-19 with hypoxia  Date of Consult: 6/30/24 at 0902    History Of Present Illness  Pt is an 89yo female with underlying CHF and multiple admissions for SOB due to fluid overload who was exposed to family member with COVID-19.  Pt was brought to ED on 6/27/24 where she tested COVID-19 positive.  Pt was discharged to home.  Was not offered any oral antiviral therapy.  She has received initial Vaccination but no boosters.  On 6/29/24 she developed increasing SOB with Pox 89% and family brought her to the ED.  Pt admits to sore throat and diffuse myalgias.  She was started on Decadron in ED.     Past Medical History  She has a past medical history of Atherosclerotic heart disease of native coronary artery without angina pectoris (04/20/2022) and Personal history of other diseases of the circulatory system (09/16/2013).    Surgical History  She has a past surgical history that includes Coronary artery bypass graft (08/08/2016) and Total hip arthroplasty (08/28/2014).     Social History  Denies smoking, ETOH, drug use.    Family History  No family exposure to known communicable illnesses  No family history of tuberculosis    Allergies  Tramadol     Medications  Decadron D2  Ceftriaxone D2  See Epic for remaining medications.    Review of Systems  Detailed review of systems completed.  No significant additional positives beyond what is mentioned above    Physical Exam  Vital signs:  Visit Vitals  /70   Pulse 60   Temp 36.5 °C (97.7 °F) (Oral)   Resp 18   Pox: 95% on 2LNC     General:  Elderly female resting comfortably in no distress  HEENT:  No scleral icterus or conjunctival suffusion, throat clear, neck supple, no cervical LAD  Lungs:  Clear to auscultation bilaterally  Heart:  RRR, S1, S2 normal, no extra sounds or murmurs.  Abdomen:  Normoactive BS, soft, NT/ND. No palpable organs or masses.  No peritoneal  signs.  Extremities:  No erythema/rash    Relevant Lab Results  Results from last 72 hours   Lab Units 06/29/24  1530   WBC AUTO x10*3/uL 6.7   HEMOGLOBIN g/dL 11.4*   HEMATOCRIT % 34.5*   PLATELETS AUTO x10*3/uL 174     Results from last 72 hours   Lab Units 06/29/24  1530   CREATININE mg/dL 1.86*   EGFR mL/min/1.73m*2 25*     Results from last 72 hours   Lab Units 06/29/24  1530   AST U/L 32   ALT U/L 20   ALK PHOS U/L 86   BILIRUBIN TOTAL mg/dL 0.8     BNP: 151    Cultures  COVID-19 PCR(6/27): positive  Urinalysis(6/30): Nit neg/LE large/WBC 21-50  Ucx(6/30): pending    Imaging  CXR(6/29): mild left atelectasis    Impression:  COVID-19 with hypoxemia  -- + family exposure.  + vaccination no boosters  2.   Mild Pyuria      Plan:  Will stop Ceftriaxone.    Await pending urine cutlure.   Pt currently with no urinary symptoms  Cont. Decadron D2 of 10  Will start Remdesivir x 5 days  Wean O2 as able  Following.  Dr. Roberson to assume care 7/1/24.      Jose Eduardo Tirado MD  [facility]  [phone]

## 2024-06-30 NOTE — H&P
Shona Montgomery is a 89 y.o. female   HPI   Patient with a past medical history of HTN, HLD, Afib on eliquis, CAD s/p CABG, HFrEF/NICM, mild-moderate MR, DM type II, CKD, anemia and recent admission for acute on chronic systolic congestive heart failure now presents again with shortness of breath and hypoxia  The symptoms started a couple days ago  Was taken to an urgent care center where she tested positive for COVID and was advised admission to the hospital  Given dexamethasone last night in the ER  Will start dexamethasone for 10 days and remdesivir for 5 days  She is vaccinated against COVID  Denies any chest pain palpitations nausea vomiting or diarrhea  Denies any dysuria    Past Medical History  Past Medical History:   Diagnosis Date    Atherosclerotic heart disease of native coronary artery without angina pectoris 04/20/2022    Atherosclerotic heart disease of native coronary artery without angina pectoris    Personal history of other diseases of the circulatory system 09/16/2013    History of hypertension       Surgical History  Past Surgical History:   Procedure Laterality Date    CORONARY ARTERY BYPASS GRAFT  08/08/2016    CABG    TOTAL HIP ARTHROPLASTY  08/28/2014    Total Hip Replacement        Social History  She reports that she has never smoked. She has never used smokeless tobacco. No history on file for alcohol use and drug use.    Family History  Family History   Problem Relation Name Age of Onset    No Known Problems Mother      No Known Problems Father          Allergies  Tramadol    Review of Systems     Constitutional: Feeling poorly  Eyes: no blurred vision and no diplopia.   ENT: no hearing loss, no tinnitus, no earache, no sore throat, no hoarseness and no swollen glands in the neck.   Cardiovascular: no chest pain, no tightness or heavy pressure, no shortness of breath, no palpitations and no lower extremity edema.   Respiratory: Shortness of breath  Gastrointestinal: no change in bowel  habits, no diarrhea, no constipation, no bloody stools, no nausea, no vomiting, no abdominal pain, no signs and symptoms of ulcer disease, no raman colored stools and no intolerance to fatty foods.   Genitourinary: no urinary frequency, no dysuria, no hematuria, no burning sensation during urination, urinary stream is not smaller and urinary stream does not start and stop.   Musculoskeletal: no arthralgias, no joint stiffness, no muscle weakness, no back pain and no difficulty walking.   Skin: no rashes, no change in skin color and pigmentation, no skin lesions and no skin lumps.   Neurological: no headaches, no dizziness, no seizures, no tingling, no numbness, no signs and symptoms of stroke and no limb weakness.   Psychiatric: no confusion, no memory lapses or loss, no depression and no sleep disturbances.   Endocrine: no goiter, no thyroid disorder, no diabetes mellitus, no excessive thirst, no dry skin, no cold intolerance, no heat intolerance and no increased urinary frequency.   Hematologic/Lymphatic: is not slow to heal, does not bleed easily, does not bruise easily, no thrombophlebitis, no anemia and no history of blood transfusion.   All other systems have been reviewed and are negative for complaint.     Vitals:    06/30/24 1100   BP: (!) 100/42   Pulse:    Resp: 20   Temp: 36.7 °C (98.1 °F)   SpO2: 98%        Scheduled medications  albuterol, 2 puff, inhalation, TID  amiodarone, 100 mg, oral, Daily  amLODIPine, 2.5 mg, oral, Daily  apixaban, 2.5 mg, oral, BID  atorvastatin, 80 mg, oral, Nightly  clopidogrel, 75 mg, oral, Daily  furosemide, 40 mg, intravenous, q24h  insulin glargine, 10 Units, subcutaneous, Nightly  insulin lispro, 0-5 Units, subcutaneous, Before meals & nightly  isosorbide mononitrate ER, 60 mg, oral, Daily  losartan, 50 mg, oral, BID  metoprolol succinate XL, 12.5 mg, oral, Nightly  pantoprazole, 40 mg, oral, Daily before breakfast   Or  pantoprazole, 40 mg, intravenous, Daily before  breakfast  remdesivir, 200 mg, intravenous, Once   Followed by  [START ON 7/1/2024] remdesivir, 100 mg, intravenous, q24h  tiotropium, 2 puff, inhalation, Daily      Continuous medications     PRN medications  PRN medications: acetaminophen **OR** acetaminophen **OR** acetaminophen, dextrose, dextrose, glucagon, glucagon, guaiFENesin, melatonin, nitroglycerin, ondansetron **OR** ondansetron, polyethylene glycol    Results from last 7 days   Lab Units 06/29/24  1530   WBC AUTO x10*3/uL 6.7   HEMOGLOBIN g/dL 11.4*   HEMATOCRIT % 34.5*   PLATELETS AUTO x10*3/uL 174     Results from last 7 days   Lab Units 06/29/24  1530   SODIUM mmol/L 133*   POTASSIUM mmol/L 4.2   CHLORIDE mmol/L 99   CO2 mmol/L 23   BUN mg/dL 39*   CREATININE mg/dL 1.86*   CALCIUM mg/dL 9.9   PROTEIN TOTAL g/dL 6.9   BILIRUBIN TOTAL mg/dL 0.8   ALK PHOS U/L 86   ALT U/L 20   AST U/L 32   GLUCOSE mg/dL 169*     Results from last 7 days   Lab Units 06/29/24  1742 06/29/24  1530   TROPHS ng/L 62* 59*        XR chest 1 view   Final Result   1.  Mild new left basilar atelectasis without evidence of   consolidation or sizable pleural effusion.                  MACRO:   None        Signed by: Jolly Neves 6/29/2024 4:30 PM   Dictation workstation:   [id number]          Physical Exam     Constitutional   General appearance: Alert and in no acute distress.   Eyes   Inspection of eyes: Sclera and conjunctiva were normal.    Pupil exam: Pupils were equal in size. Extraocular movements were intact.   Pulmonary   Respiratory assessment: No respiratory distress, normal respiratory rhythm and effort.    Auscultation of Lungs: Crackles  Cardiovascular   Auscultation of heart: Apical pulse normal, heart rate and rhythm normal, normal S1 and S2, no murmurs and no pericardial rub.    Exam for edema: No peripheral edema.   Abdomen   Abdominal Exam: No bruits, normal bowel sounds, soft, non-tender, no abdominal mass palpated.    Liver and Spleen exam: No  hepato-splenomegaly.      Inspection of digits and nails: No clubbing or cyanosis of the fingernails.    Inspection/palpation of joints, bones and muscles: No joint swelling. Normal movement of all extremities.   Skin   Skin inspection: Normal skin color and pigmentation, normal skin turgor and no visible rash.   Neurologic   Cranial nerves: Nerves 2-12 were intact, no focal neuro defects.   Psychiatric   Orientation: Oriented to person, place, and time.    Mood and affect: Normal.       Assessment/Plan      #COVID-pneumonia with hypoxia  Started on Decadron and remdesivir by ID  Monitor  DuoNebs as needed    #Hypertension  Low blood pressures  Hold amlodipine    #Atrial fibrillation  Rate controlled  Continue Eliquis    #Diabetes mellitus  Sliding-scale insulin coverage    #Chronic systolic congestive heart failure  Appears euvolemic  Continue current medications

## 2024-06-30 NOTE — CARE PLAN
The patient's goals for the shift include to be able to get some rest    The clinical goals for the shift include pt will remain hds throughout shift

## 2024-06-30 NOTE — NURSING NOTE
Patient's son at bedside states that patient has not voided since yesterday afternoon at 1330. Bladder scanned patient. 200 ml noted in bladder via bladder scan. Patient does not complain of discomfort and does not feel the urge to void.

## 2024-06-30 NOTE — CARE PLAN
The patient's goals for the shift include to be able to get some rest    The clinical goals for the shift include Patient will be free of falls this shift.      Problem: Pain - Adult  Goal: Verbalizes/displays adequate comfort level or baseline comfort level  Outcome: Not Progressing     Problem: Safety - Adult  Goal: Free from fall injury  Outcome: Not Progressing     Problem: Discharge Planning  Goal: Discharge to home or other facility with appropriate resources  Outcome: Not Progressing     Problem: Chronic Conditions and Co-morbidities  Goal: Patient's chronic conditions and co-morbidity symptoms are monitored and maintained or improved  Outcome: Not Progressing

## 2024-07-01 LAB
ANION GAP SERPL CALC-SCNC: 13 MMOL/L (ref 10–20)
BACTERIA UR CULT: NO GROWTH
BUN SERPL-MCNC: 49 MG/DL (ref 6–23)
CALCIUM SERPL-MCNC: 8.6 MG/DL (ref 8.6–10.3)
CHLORIDE SERPL-SCNC: 100 MMOL/L (ref 98–107)
CO2 SERPL-SCNC: 24 MMOL/L (ref 21–32)
CREAT SERPL-MCNC: 1.74 MG/DL (ref 0.5–1.05)
EGFRCR SERPLBLD CKD-EPI 2021: 27 ML/MIN/1.73M*2
ERYTHROCYTE [DISTWIDTH] IN BLOOD BY AUTOMATED COUNT: 13.1 % (ref 11.5–14.5)
GLUCOSE BLD MANUAL STRIP-MCNC: 152 MG/DL (ref 74–99)
GLUCOSE BLD MANUAL STRIP-MCNC: 172 MG/DL (ref 74–99)
GLUCOSE BLD MANUAL STRIP-MCNC: 180 MG/DL (ref 74–99)
GLUCOSE BLD MANUAL STRIP-MCNC: 219 MG/DL (ref 74–99)
GLUCOSE SERPL-MCNC: 196 MG/DL (ref 74–99)
HCT VFR BLD AUTO: 31.6 % (ref 36–46)
HGB BLD-MCNC: 10.6 G/DL (ref 12–16)
MCH RBC QN AUTO: 31.3 PG (ref 26–34)
MCHC RBC AUTO-ENTMCNC: 33.5 G/DL (ref 32–36)
MCV RBC AUTO: 93 FL (ref 80–100)
NRBC BLD-RTO: 0 /100 WBCS (ref 0–0)
PLATELET # BLD AUTO: 174 X10*3/UL (ref 150–450)
POTASSIUM SERPL-SCNC: 3.4 MMOL/L (ref 3.5–5.3)
RBC # BLD AUTO: 3.39 X10*6/UL (ref 4–5.2)
SODIUM SERPL-SCNC: 134 MMOL/L (ref 136–145)
WBC # BLD AUTO: 3.8 X10*3/UL (ref 4.4–11.3)

## 2024-07-01 PROCEDURE — 82947 ASSAY GLUCOSE BLOOD QUANT: CPT

## 2024-07-01 PROCEDURE — 2500000002 HC RX 250 W HCPCS SELF ADMINISTERED DRUGS (ALT 637 FOR MEDICARE OP, ALT 636 FOR OP/ED): Performed by: FAMILY MEDICINE

## 2024-07-01 PROCEDURE — 94668 MNPJ CHEST WALL SBSQ: CPT

## 2024-07-01 PROCEDURE — 80048 BASIC METABOLIC PNL TOTAL CA: CPT | Performed by: INTERNAL MEDICINE

## 2024-07-01 PROCEDURE — 94640 AIRWAY INHALATION TREATMENT: CPT

## 2024-07-01 PROCEDURE — 2500000001 HC RX 250 WO HCPCS SELF ADMINISTERED DRUGS (ALT 637 FOR MEDICARE OP): Performed by: INTERNAL MEDICINE

## 2024-07-01 PROCEDURE — 85027 COMPLETE CBC AUTOMATED: CPT | Performed by: INTERNAL MEDICINE

## 2024-07-01 PROCEDURE — 2500000002 HC RX 250 W HCPCS SELF ADMINISTERED DRUGS (ALT 637 FOR MEDICARE OP, ALT 636 FOR OP/ED): Performed by: INTERNAL MEDICINE

## 2024-07-01 PROCEDURE — 1200000002 HC GENERAL ROOM WITH TELEMETRY DAILY

## 2024-07-01 PROCEDURE — 36415 COLL VENOUS BLD VENIPUNCTURE: CPT | Performed by: INTERNAL MEDICINE

## 2024-07-01 PROCEDURE — 9420000001 HC RT PATIENT EDUCATION 5 MIN

## 2024-07-01 PROCEDURE — 2500000004 HC RX 250 GENERAL PHARMACY W/ HCPCS (ALT 636 FOR OP/ED): Performed by: INTERNAL MEDICINE

## 2024-07-01 PROCEDURE — 2500000005 HC RX 250 GENERAL PHARMACY W/O HCPCS: Mod: JZ | Performed by: INTERNAL MEDICINE

## 2024-07-01 RX ORDER — POTASSIUM CHLORIDE 20 MEQ/1
40 TABLET, EXTENDED RELEASE ORAL ONCE
Status: COMPLETED | OUTPATIENT
Start: 2024-07-01 | End: 2024-07-01

## 2024-07-01 RX ADMIN — ISOSORBIDE MONONITRATE 60 MG: 30 TABLET, EXTENDED RELEASE ORAL at 10:35

## 2024-07-01 RX ADMIN — INSULIN LISPRO 2 UNITS: 100 INJECTION, SOLUTION INTRAVENOUS; SUBCUTANEOUS at 23:13

## 2024-07-01 RX ADMIN — CLOPIDOGREL 75 MG: 75 TABLET ORAL at 10:35

## 2024-07-01 RX ADMIN — ALBUTEROL SULFATE 2 PUFF: 90 AEROSOL, METERED RESPIRATORY (INHALATION) at 20:44

## 2024-07-01 RX ADMIN — METOPROLOL SUCCINATE 12.5 MG: 25 TABLET, EXTENDED RELEASE ORAL at 21:26

## 2024-07-01 RX ADMIN — REMDESIVIR 100 MG: 100 INJECTION, POWDER, LYOPHILIZED, FOR SOLUTION INTRAVENOUS at 10:37

## 2024-07-01 RX ADMIN — INSULIN GLARGINE 10 UNITS: 100 INJECTION, SOLUTION SUBCUTANEOUS at 23:13

## 2024-07-01 RX ADMIN — ALBUTEROL SULFATE 2 PUFF: 90 AEROSOL, METERED RESPIRATORY (INHALATION) at 08:16

## 2024-07-01 RX ADMIN — PANTOPRAZOLE SODIUM 40 MG: 40 TABLET, DELAYED RELEASE ORAL at 06:25

## 2024-07-01 RX ADMIN — ALBUTEROL SULFATE 2 PUFF: 90 AEROSOL, METERED RESPIRATORY (INHALATION) at 15:02

## 2024-07-01 RX ADMIN — APIXABAN 2.5 MG: 2.5 TABLET, FILM COATED ORAL at 10:35

## 2024-07-01 RX ADMIN — ATORVASTATIN CALCIUM 80 MG: 80 TABLET, FILM COATED ORAL at 21:26

## 2024-07-01 RX ADMIN — LOSARTAN POTASSIUM 50 MG: 50 TABLET, FILM COATED ORAL at 21:26

## 2024-07-01 RX ADMIN — TIOTROPIUM BROMIDE INHALATION SPRAY 2 PUFF: 3.12 SPRAY, METERED RESPIRATORY (INHALATION) at 08:16

## 2024-07-01 RX ADMIN — AMIODARONE HYDROCHLORIDE 100 MG: 200 TABLET ORAL at 10:35

## 2024-07-01 RX ADMIN — POTASSIUM CHLORIDE 40 MEQ: 1500 TABLET, EXTENDED RELEASE ORAL at 17:15

## 2024-07-01 RX ADMIN — LOSARTAN POTASSIUM 50 MG: 50 TABLET, FILM COATED ORAL at 10:35

## 2024-07-01 RX ADMIN — INSULIN LISPRO 1 UNITS: 100 INJECTION, SOLUTION INTRAVENOUS; SUBCUTANEOUS at 17:23

## 2024-07-01 RX ADMIN — INSULIN LISPRO 1 UNITS: 100 INJECTION, SOLUTION INTRAVENOUS; SUBCUTANEOUS at 12:43

## 2024-07-01 RX ADMIN — APIXABAN 2.5 MG: 2.5 TABLET, FILM COATED ORAL at 21:26

## 2024-07-01 RX ADMIN — INSULIN LISPRO 1 UNITS: 100 INJECTION, SOLUTION INTRAVENOUS; SUBCUTANEOUS at 10:36

## 2024-07-01 SDOH — HEALTH STABILITY: MENTAL HEALTH: HOW MANY STANDARD DRINKS CONTAINING ALCOHOL DO YOU HAVE ON A TYPICAL DAY?: PATIENT DOES NOT DRINK

## 2024-07-01 SDOH — ECONOMIC STABILITY: INCOME INSECURITY: IN THE LAST 12 MONTHS, WAS THERE A TIME WHEN YOU WERE NOT ABLE TO PAY THE MORTGAGE OR RENT ON TIME?: NO

## 2024-07-01 SDOH — HEALTH STABILITY: MENTAL HEALTH: HOW OFTEN DO YOU HAVE A DRINK CONTAINING ALCOHOL?: NEVER

## 2024-07-01 SDOH — ECONOMIC STABILITY: INCOME INSECURITY: HOW HARD IS IT FOR YOU TO PAY FOR THE VERY BASICS LIKE FOOD, HOUSING, MEDICAL CARE, AND HEATING?: NOT VERY HARD

## 2024-07-01 ASSESSMENT — COGNITIVE AND FUNCTIONAL STATUS - GENERAL
TURNING FROM BACK TO SIDE WHILE IN FLAT BAD: A LITTLE
PERSONAL GROOMING: A LITTLE
HELP NEEDED FOR BATHING: A LITTLE
DRESSING REGULAR LOWER BODY CLOTHING: A LITTLE
DAILY ACTIVITIY SCORE: 19
MOBILITY SCORE: 16
TOILETING: A LITTLE
STANDING UP FROM CHAIR USING ARMS: A LITTLE
WALKING IN HOSPITAL ROOM: A LOT
MOVING FROM LYING ON BACK TO SITTING ON SIDE OF FLAT BED WITH BEDRAILS: A LITTLE
MOVING TO AND FROM BED TO CHAIR: A LITTLE
CLIMB 3 TO 5 STEPS WITH RAILING: A LOT
DRESSING REGULAR UPPER BODY CLOTHING: A LITTLE

## 2024-07-01 ASSESSMENT — PAIN SCALES - GENERAL: PAINLEVEL_OUTOF10: 0 - NO PAIN

## 2024-07-01 NOTE — CARE PLAN
The patient's goals for the shift include to be able to get some rest    The clinical goals for the shift include PT WILL REMAIN FREE FROM INJURY THIS SHIFT      Problem: Pain - Adult  Goal: Verbalizes/displays adequate comfort level or baseline comfort level  Outcome: Progressing     Problem: Safety - Adult  Goal: Free from fall injury  Outcome: Progressing     Problem: Discharge Planning  Goal: Discharge to home or other facility with appropriate resources  Outcome: Progressing     Problem: Chronic Conditions and Co-morbidities  Goal: Patient's chronic conditions and co-morbidity symptoms are monitored and maintained or improved  Outcome: Progressing     Problem: Skin  Goal: Promote/optimize nutrition  Outcome: Progressing  Flowsheets (Taken 7/1/2024 0159)  Promote/optimize nutrition:   Consume > 50% meals/supplements   Monitor/record intake including meals

## 2024-07-01 NOTE — PROGRESS NOTES
07/01/24 0914   Discharge Planning   Living Arrangements Alone   Support Systems Children   Assistance Needed cooking, cleaning   Type of Residence Private residence   Number of Stairs to Enter Residence 3   Number of Stairs Within Residence 13   Do you have animals or pets at home? No   Who is requesting discharge planning? Patient   Home or Post Acute Services None   Patient expects to be discharged to: home vs snf   Does the patient need discharge transport arranged? Yes   RoundTrip coordination needed? Yes   Has discharge transport been arranged? No   Financial Resource Strain   How hard is it for you to pay for the very basics like food, housing, medical care, and heating? Not very   Housing Stability   In the last 12 months, was there a time when you were not able to pay the mortgage or rent on time? N   In the last 12 months, was there a time when you did not have a steady place to sleep or slept in a shelter (including now)? N   Transportation Needs   In the past 12 months, has lack of transportation kept you from medical appointments or from getting medications? no   In the past 12 months, has lack of transportation kept you from meetings, work, or from getting things needed for daily living? No   Patient Choice   Provider Choice list and CMS website (https://medicare.gov/care-compare#search) for post-acute Quality and Resource Measure Data were provided and reviewed with: Patient     I called the patient's daughter Jesscia Garcia at [phone] to discuss discharge planning, she stated the patient does live home alone, however she has 7 children who all provide care, she stated they cook her meals and help her with bathing and ADL's, she stated most of the time someone is there at night with her they do have equipment as well at home for her, patient's daughter stated she ambulates on her own but does use a cane sometimes, the hope is to have her come home on discharge, patient's daughter stated she does  realize they may need placement on discharge and that is something they would need to discuss as a family, but the hope if for the patient to return home, I will continue to monitor for discharge planning.

## 2024-07-01 NOTE — PROGRESS NOTES
"  INFECTIOUS DISEASE DAILY PROGRESS NOTE    SUBJECTIVE:    No overnight events. Afebrile. No rash/itching. Remains on nasal cannula 2L. Having GI upset, nausea, loose stools.    OBJECTIVE:  VITALS (Last 24 Hours)  /56 (BP Location: Right arm, Patient Position: Lying)   Pulse 57   Temp 36.4 °C (97.6 °F) (Oral)   Resp 17   Ht 1.6 m (5' 2.99\")   Wt 50.3 kg (111 lb)   LMP  (LMP Unknown)   SpO2 97%   BMI 19.67 kg/m²     PHYSICAL EXAM:  Gen - NAD, in bed  Heart - RRR  Lungs - clear, no wheezing  Abd - soft, no ttp  Skin - no rash    ABX: IV Remdesivir, PO Decadron    LABS:  Lab Results   Component Value Date    WBC 3.8 (L) 07/01/2024    HGB 10.6 (L) 07/01/2024    HCT 31.6 (L) 07/01/2024    MCV 93 07/01/2024     07/01/2024     Lab Results   Component Value Date    GLUCOSE 196 (H) 07/01/2024    CALCIUM 8.6 07/01/2024     (L) 07/01/2024    K 3.4 (L) 07/01/2024    CO2 24 07/01/2024     07/01/2024    BUN 49 (H) 07/01/2024    CREATININE 1.74 (H) 07/01/2024     Results from last 72 hours   Lab Units 06/29/24  1530   ALK PHOS U/L 86   BILIRUBIN TOTAL mg/dL 0.8   PROTEIN TOTAL g/dL 6.9   ALT U/L 20   AST U/L 32   ALBUMIN g/dL 3.8     Estimated Creatinine Clearance: 15.7 mL/min (A) (by C-G formula based on SCr of 1.74 mg/dL (H)).      ASSESSMENT/PLAN:    COVID-19 Infection with Hypoxia  CKD - CrCl 15, stable. Cr is better.    PO Decadron 6mg/day or 10D total course. IV Remdesivir 5D course.    GI symptoms related to COVID infection and doubtful is Remdesivir.    Monitoring for adverse effects of abx such as rash/itching.    Will follow. Thanks!    Cristian Roberson MD  [facility]  Office #[phone]      "

## 2024-07-01 NOTE — PROGRESS NOTES
Shona Montgomery is a 89 y.o. female on day 2 of admission presenting with COVID.      Subjective   Feeling better today  Did have difficlty breating last night   No pain  Po intake improving        Objective     Last Recorded Vitals  /65 (BP Location: Right arm, Patient Position: Lying)   Pulse 86   Temp 36.4 °C (97.6 °F) (Axillary)   Resp 17   Wt 50.3 kg (111 lb)   SpO2 94%   Intake/Output last 3 Shifts:  No intake or output data in the 24 hours ending 07/01/24 1921    Admission Weight  Weight: 50.3 kg (111 lb) (06/29/24 1455)    Daily Weight  06/29/24 : 50.3 kg (111 lb)    Image Results  Electrocardiogram, 12-lead PRN ACS symptoms  Sinus rhythm with Premature atrial complexes  ST & T wave abnormality, consider lateral ischemia  Abnormal ECG  When compared with ECG of 25-JUN-2024 13:52,  Premature atrial complexes are now Present      Physical Exam    Chest clear  CVS regular  Ext no edema  Abdo benign  Heent normal   Neck supple     Relevant Results             Assessment/Plan      Pt with   Covid   Hypoxia   Htn  Ckd  Chf     Principal Problem:    COVID    Cont with current   Seen dw daughter         Elieser Marks MD

## 2024-07-02 ENCOUNTER — APPOINTMENT (OUTPATIENT)
Dept: CARDIOLOGY | Facility: HOSPITAL | Age: 89
End: 2024-07-02
Payer: MEDICARE

## 2024-07-02 LAB
ANION GAP SERPL CALC-SCNC: 11 MMOL/L (ref 10–20)
ATRIAL RATE: 51 BPM
BUN SERPL-MCNC: 44 MG/DL (ref 6–23)
CALCIUM SERPL-MCNC: 8.7 MG/DL (ref 8.6–10.3)
CARDIAC TROPONIN I PNL SERPL HS: 29 NG/L (ref 0–13)
CHLORIDE SERPL-SCNC: 105 MMOL/L (ref 98–107)
CO2 SERPL-SCNC: 25 MMOL/L (ref 21–32)
CREAT SERPL-MCNC: 1.62 MG/DL (ref 0.5–1.05)
EGFRCR SERPLBLD CKD-EPI 2021: 29 ML/MIN/1.73M*2
ERYTHROCYTE [DISTWIDTH] IN BLOOD BY AUTOMATED COUNT: 13.2 % (ref 11.5–14.5)
GLUCOSE BLD MANUAL STRIP-MCNC: 140 MG/DL (ref 74–99)
GLUCOSE BLD MANUAL STRIP-MCNC: 152 MG/DL (ref 74–99)
GLUCOSE BLD MANUAL STRIP-MCNC: 176 MG/DL (ref 74–99)
GLUCOSE BLD MANUAL STRIP-MCNC: 235 MG/DL (ref 74–99)
GLUCOSE SERPL-MCNC: 130 MG/DL (ref 74–99)
HCT VFR BLD AUTO: 33.1 % (ref 36–46)
HGB BLD-MCNC: 10.9 G/DL (ref 12–16)
HOLD SPECIMEN: NORMAL
MCH RBC QN AUTO: 31.1 PG (ref 26–34)
MCHC RBC AUTO-ENTMCNC: 32.9 G/DL (ref 32–36)
MCV RBC AUTO: 95 FL (ref 80–100)
NRBC BLD-RTO: 0 /100 WBCS (ref 0–0)
P AXIS: 64 DEGREES
P OFFSET: 209 MS
P ONSET: 144 MS
PLATELET # BLD AUTO: 180 X10*3/UL (ref 150–450)
POTASSIUM SERPL-SCNC: 4.1 MMOL/L (ref 3.5–5.3)
PR INTERVAL: 148 MS
Q ONSET: 218 MS
QRS COUNT: 8 BEATS
QRS DURATION: 114 MS
QT INTERVAL: 444 MS
QTC CALCULATION(BAZETT): 409 MS
QTC FREDERICIA: 421 MS
R AXIS: -5 DEGREES
RBC # BLD AUTO: 3.5 X10*6/UL (ref 4–5.2)
SODIUM SERPL-SCNC: 137 MMOL/L (ref 136–145)
T AXIS: 114 DEGREES
T OFFSET: 440 MS
VENTRICULAR RATE: 51 BPM
WBC # BLD AUTO: 3.7 X10*3/UL (ref 4.4–11.3)

## 2024-07-02 PROCEDURE — 2500000005 HC RX 250 GENERAL PHARMACY W/O HCPCS: Mod: JZ | Performed by: INTERNAL MEDICINE

## 2024-07-02 PROCEDURE — 2500000004 HC RX 250 GENERAL PHARMACY W/ HCPCS (ALT 636 FOR OP/ED): Performed by: INTERNAL MEDICINE

## 2024-07-02 PROCEDURE — 82947 ASSAY GLUCOSE BLOOD QUANT: CPT

## 2024-07-02 PROCEDURE — 36415 COLL VENOUS BLD VENIPUNCTURE: CPT | Performed by: FAMILY MEDICINE

## 2024-07-02 PROCEDURE — 94668 MNPJ CHEST WALL SBSQ: CPT

## 2024-07-02 PROCEDURE — 82374 ASSAY BLOOD CARBON DIOXIDE: CPT | Performed by: INTERNAL MEDICINE

## 2024-07-02 PROCEDURE — 94640 AIRWAY INHALATION TREATMENT: CPT

## 2024-07-02 PROCEDURE — 84484 ASSAY OF TROPONIN QUANT: CPT | Performed by: FAMILY MEDICINE

## 2024-07-02 PROCEDURE — 93010 ELECTROCARDIOGRAM REPORT: CPT | Performed by: INTERNAL MEDICINE

## 2024-07-02 PROCEDURE — 36415 COLL VENOUS BLD VENIPUNCTURE: CPT | Performed by: INTERNAL MEDICINE

## 2024-07-02 PROCEDURE — 1200000002 HC GENERAL ROOM WITH TELEMETRY DAILY

## 2024-07-02 PROCEDURE — 99221 1ST HOSP IP/OBS SF/LOW 40: CPT | Performed by: INTERNAL MEDICINE

## 2024-07-02 PROCEDURE — 94664 DEMO&/EVAL PT USE INHALER: CPT

## 2024-07-02 PROCEDURE — 2500000002 HC RX 250 W HCPCS SELF ADMINISTERED DRUGS (ALT 637 FOR MEDICARE OP, ALT 636 FOR OP/ED): Performed by: INTERNAL MEDICINE

## 2024-07-02 PROCEDURE — 85027 COMPLETE CBC AUTOMATED: CPT | Performed by: INTERNAL MEDICINE

## 2024-07-02 PROCEDURE — 93005 ELECTROCARDIOGRAM TRACING: CPT

## 2024-07-02 PROCEDURE — 2500000001 HC RX 250 WO HCPCS SELF ADMINISTERED DRUGS (ALT 637 FOR MEDICARE OP): Performed by: INTERNAL MEDICINE

## 2024-07-02 RX ADMIN — APIXABAN 2.5 MG: 2.5 TABLET, FILM COATED ORAL at 22:47

## 2024-07-02 RX ADMIN — LOSARTAN POTASSIUM 50 MG: 50 TABLET, FILM COATED ORAL at 10:04

## 2024-07-02 RX ADMIN — ATORVASTATIN CALCIUM 80 MG: 80 TABLET, FILM COATED ORAL at 22:47

## 2024-07-02 RX ADMIN — INSULIN GLARGINE 10 UNITS: 100 INJECTION, SOLUTION SUBCUTANEOUS at 22:26

## 2024-07-02 RX ADMIN — APIXABAN 2.5 MG: 2.5 TABLET, FILM COATED ORAL at 10:04

## 2024-07-02 RX ADMIN — FUROSEMIDE 20 MG: 20 TABLET ORAL at 10:04

## 2024-07-02 RX ADMIN — AMIODARONE HYDROCHLORIDE 100 MG: 200 TABLET ORAL at 10:04

## 2024-07-02 RX ADMIN — ISOSORBIDE MONONITRATE 60 MG: 30 TABLET, EXTENDED RELEASE ORAL at 10:04

## 2024-07-02 RX ADMIN — ALBUTEROL SULFATE 2 PUFF: 90 AEROSOL, METERED RESPIRATORY (INHALATION) at 12:09

## 2024-07-02 RX ADMIN — ALBUTEROL SULFATE 2 PUFF: 90 AEROSOL, METERED RESPIRATORY (INHALATION) at 07:24

## 2024-07-02 RX ADMIN — INSULIN LISPRO 1 UNITS: 100 INJECTION, SOLUTION INTRAVENOUS; SUBCUTANEOUS at 13:38

## 2024-07-02 RX ADMIN — TIOTROPIUM BROMIDE INHALATION SPRAY 2 PUFF: 3.12 SPRAY, METERED RESPIRATORY (INHALATION) at 07:25

## 2024-07-02 RX ADMIN — REMDESIVIR 100 MG: 100 INJECTION, POWDER, LYOPHILIZED, FOR SOLUTION INTRAVENOUS at 10:04

## 2024-07-02 RX ADMIN — CLOPIDOGREL 75 MG: 75 TABLET ORAL at 10:04

## 2024-07-02 RX ADMIN — PANTOPRAZOLE SODIUM 40 MG: 40 TABLET, DELAYED RELEASE ORAL at 10:07

## 2024-07-02 RX ADMIN — ALBUTEROL SULFATE 2 PUFF: 90 AEROSOL, METERED RESPIRATORY (INHALATION) at 19:59

## 2024-07-02 RX ADMIN — INSULIN LISPRO 1 UNITS: 100 INJECTION, SOLUTION INTRAVENOUS; SUBCUTANEOUS at 22:26

## 2024-07-02 RX ADMIN — INSULIN LISPRO 2 UNITS: 100 INJECTION, SOLUTION INTRAVENOUS; SUBCUTANEOUS at 17:05

## 2024-07-02 RX ADMIN — LOSARTAN POTASSIUM 50 MG: 50 TABLET, FILM COATED ORAL at 22:47

## 2024-07-02 ASSESSMENT — COGNITIVE AND FUNCTIONAL STATUS - GENERAL
TURNING FROM BACK TO SIDE WHILE IN FLAT BAD: A LITTLE
PERSONAL GROOMING: A LITTLE
MOBILITY SCORE: 16
MOVING TO AND FROM BED TO CHAIR: A LITTLE
MOBILITY SCORE: 16
WALKING IN HOSPITAL ROOM: A LOT
STANDING UP FROM CHAIR USING ARMS: A LITTLE
WALKING IN HOSPITAL ROOM: A LOT
TOILETING: A LITTLE
DAILY ACTIVITIY SCORE: 18
TOILETING: A LITTLE
EATING MEALS: A LITTLE
TURNING FROM BACK TO SIDE WHILE IN FLAT BAD: A LITTLE
STANDING UP FROM CHAIR USING ARMS: A LITTLE
CLIMB 3 TO 5 STEPS WITH RAILING: A LOT
HELP NEEDED FOR BATHING: A LITTLE
DRESSING REGULAR UPPER BODY CLOTHING: A LITTLE
MOVING TO AND FROM BED TO CHAIR: A LITTLE
DAILY ACTIVITIY SCORE: 19
PERSONAL GROOMING: A LITTLE
DRESSING REGULAR LOWER BODY CLOTHING: A LITTLE
MOVING FROM LYING ON BACK TO SITTING ON SIDE OF FLAT BED WITH BEDRAILS: A LITTLE
CLIMB 3 TO 5 STEPS WITH RAILING: A LOT
DRESSING REGULAR LOWER BODY CLOTHING: A LITTLE
HELP NEEDED FOR BATHING: A LITTLE
MOVING FROM LYING ON BACK TO SITTING ON SIDE OF FLAT BED WITH BEDRAILS: A LITTLE
DRESSING REGULAR UPPER BODY CLOTHING: A LITTLE

## 2024-07-02 ASSESSMENT — PAIN SCALES - GENERAL
PAINLEVEL_OUTOF10: 0 - NO PAIN
PAINLEVEL_OUTOF10: 0 - NO PAIN

## 2024-07-02 ASSESSMENT — PAIN - FUNCTIONAL ASSESSMENT: PAIN_FUNCTIONAL_ASSESSMENT: 0-10

## 2024-07-02 NOTE — CONSULTS
"Inpatient consult to Cardiology  Consult performed by: Patt Miller, APRN-CNP  Consult ordered by: Elieser Marks MD  Reason for consult: chest pain.      History Of Present Illness:    Shona Montgomery is a 89 y.o. female with past medical history of   hypertension, hyperlipidemia, CKD stage III,  p A-fib on low dose Eliquis, NICM (EF 35-40%), MR mild to mod, CAD status post CABG 2016 and prior non-ST elevation myocardial infarction (repeat cath 7 years ago showed a patent sequential LIMA with an occluded SVG-RCA) , chronic systolic heart failure, type 2 diabetes presenting to [facility] with increasing shortness of breath, found to have COVID infection.  Cardiology consulted for chest pain.    Recent hospitalization at [facility] 6-12/2024 - 6/14/2024 for uncontrolled hypertension and LE swelling. She then followed up with cardiology, with no medication changes necessary.    She was seen in ED on 6/27/2024 for SOB and diagnosed with COVID and discharged home.  She came back to ED on 6/29 with increasing shortness of breath and pulse oxygen 89%, admitted and started on  remdesivir.  States she has been having on and off vague chest pressure worse with coughing or movement. She has been feeling \"terrible\" the past few days.  She was having a sore throat and really sore all over. She is still short of breath, worse with any exertion.  Denies any arm or leg swelling. Denies any palpitations.  No syncope.  No current chest pain.     Follows with Dr Marlena Lew for cardiology, last visit 6/25/2024    Past Cardiology Tests (Last 3 Years):  EKG:  Results for orders placed during the hospital encounter of 06/29/24    Electrocardiogram, 12-lead PRN ACS symptoms (Preliminary)  This result has not been signed. Information might be incomplete.    Narrative  Sinus rhythm with Premature atrial complexes  ST & T wave abnormality, consider lateral ischemia  Abnormal ECG  When compared with ECG of 25-JUN-2024 13:52,  Premature atrial " complexes are now Present      Results for orders placed in visit on 06/25/24    ECG 12 lead (Clinic Performed)    Narrative  Sinus bradycardia  Possible Left atrial enlargement  ST & T wave abnormality, consider lateral ischemia  Abnormal ECG  When compared with ECG of 12-JUN-2024 22:31,  No significant change was found  Confirmed by Mohsen Mendiola ([id number]) on 6/26/2024 7:28:04 AM      Results for orders placed during the hospital encounter of 06/12/24    ECG 12 lead    Narrative  Sinus bradycardia  Nonspecific intraventricular conduction delay  Nonspecific T wave abnormality  Abnormal ECG  When compared with ECG of 30-APR-2024 14:51,  T wave inversion no longer evident in Lateral leads  See ED provider note for full interpretation and clinical correlation  Confirmed by Shannan Meza ([id number]) on 6/15/2024 2:51:31 PM      ECG 12 lead    Narrative  Sinus bradycardia  Minimal voltage criteria for LVH, may be normal variant ( Kamas product )  ST & T wave abnormality, consider lateral ischemia  Abnormal ECG  When compared with ECG of 12-JUN-2024 20:12, (unconfirmed)  Questionable change in QRS axis  See ED provider note for full interpretation and clinical correlation  Confirmed by Shannan Meza ([id number]) on 6/15/2024 2:51:48 PM      Results for orders placed in visit on 04/30/24    ECG 12 lead (Clinic Performed)    Narrative  Sinus bradycardia  ST & T wave abnormality, consider lateral ischemia  Abnormal ECG  When compared with ECG of 09-JAN-2024 15:10,  No significant change was found  Confirmed by Mohsen Mendiola ([id number]) on 5/1/2024 8:33:53 AM      Results for orders placed in visit on 01/09/24    ECG 12 lead (Clinic Performed)    Narrative  Sinus bradycardia  Nonspecific ST and T wave abnormality  Abnormal ECG  Confirmed by Mohsen Mendiola ([id number]) on 1/10/2024 8:06:26 AM      Results for orders placed during the hospital encounter of 12/22/23    Electrocardiogram, 12-lead PRN ACS symptoms    Narrative  Normal  sinus rhythm  Nonspecific ST and T wave abnormality  Abnormal ECG  When compared with ECG of 22-DEC-2023 11:41, (unconfirmed)  No significant change was found  See ED provider note for full interpretation and clinical correlation  Confirmed by Denisha Dillard ([id number]) on 12/27/2023 10:51:14 AM      ECG 12 lead    Narrative  Normal sinus rhythm  ST & T wave abnormality, consider lateral ischemia  Abnormal ECG  Confirmed by Mohsen Mendiola ([id number]) on 12/27/2023 9:57:45 AM      Results for orders placed in visit on 12/18/23    ECG 12 lead (Clinic Performed)    Narrative  Sinus bradycardia  Possible Left atrial enlargement  T wave abnormality, consider lateral ischemia  Abnormal ECG  Confirmed by Mohsen Mendiola ([id number]) on 12/18/2023 5:37:58 PM      Results for orders placed in visit on 10/24/23    ECG 12 lead (Clinic Performed)    Narrative  Sinus bradycardia  Incomplete left bundle branch block  Nonspecific T wave abnormality  Abnormal ECG  Confirmed by Mohsen Mendiola ([id number]) on 10/24/2023 7:17:54 PM    Echo:  TTE 4/2022  1. The left ventricular systolic function is moderately decreased with a 35-40% estimated ejection fraction.  2. Spectral Doppler shows an impaired relaxation pattern of left ventricular diastolic filling.  3. The left atrium is severely dilated.  4. Moderate mitral valve regurgitation.  5. Mildly elevated RVSP.     TTE 4/2021  CONCLUSIONS:   1. The left ventricular systolic function is mildly decreased with a 45-50% estimated ejection fraction.   2. Basal and mid inferior wall and basal and mid inferolateral wall are abnormal.   3. Spectral Doppler shows an impaired relaxation pattern of left ventricular diastolic filling.   4. There is an elevated mean left atrial pressure.   5. There are multiple wall motion abnormalities     TTE 3/2021  CONCLUSIONS:   1. The left ventricular systolic function is normal with a 45-50% estimated ejection fraction.   2. Abnormal septal motion consistent with  post-operative status.   3. Spectral Doppler shows an impaired relaxation pattern of left ventricular diastolic filling.   4. There is an elevated mean left atrial pressure.   5. There is inferobasal and inferolateral hypo- to akinesia.     TTE 8/2019  CONCLUSIONS:   1. The left ventricular systolic function is mildly decreased with a 45% estimated ejection fraction.   2. Mid inferior segment, basal and mid inferolateral wall, and basal inferoseptal segment are abnormal.   3. Abnormal septal motion consistent with post-operative status.   4. Spectral Doppler shows an impaired relaxation pattern of left ventricular diastolic filling.   5. There is an elevated mean left atrial pressure.   6. The left atrium is moderately dilated.   7. There are multiple wall motion abnormalities.     Cath:     CAD status post CABG  and prior non-ST elevation myocardial infarction (repeat cath 7 years ago showed a patent sequential LIMA with an occluded SVG-RCA      Stress Test:   Nuclear stress 2017  Summary:   1. No clinical or electrocardiographic evidence for ischemia at a maximal infusion.   2. Nuclear image results are reported separately.     Cardiac Imaging:  No results found for this or any previous visit.      Past Medical History:  She has a past medical history of Atherosclerotic heart disease of native coronary artery without angina pectoris (04/20/2022) and Personal history of other diseases of the circulatory system (09/16/2013).    Past Surgical History:  She has a past surgical history that includes Coronary artery bypass graft (08/08/2016) and Total hip arthroplasty (08/28/2014).      Social History:  She reports that she has never smoked. She has never used smokeless tobacco. No history on file for alcohol use and drug use.    Family History:  Family History   Problem Relation Name Age of Onset    No Known Problems Mother      No Known Problems Father          Allergies:  Tramadol    ROS:  10 point review of systems  "including (Constitutional, Eyes, ENMT, Respiratory, Cardiac, Gastrointestinal, Neurological, Psychiatric, and Hematologic) was performed and is otherwise negative.    Objective Data:  Last Recorded Vitals:  Vitals:    24 0724 24 0820 24 1123 24 1209   BP:  151/53 146/66    BP Location:  Right arm Right arm    Patient Position:  Lying Lying    Pulse:  60 53    Resp:  17 17    Temp:  37.1 °C (98.7 °F) 37.1 °C (98.8 °F)    TempSrc:  Temporal Temporal    SpO2: 94% 93% 95% 95%   Weight:       Height:         Medical Gas Therapy: None (Room air)  O2 Delivery Method: Nasal cannula  Weight  Av.3 kg (111 lb)  Min: 50.3 kg (111 lb)  Max: 50.3 kg (111 lb)    LABS:  CMP:  Results from last 7 days   Lab Units 24  0724  0602 24  1530   SODIUM mmol/L 137 134* 133*   POTASSIUM mmol/L 4.1 3.4* 4.2   CHLORIDE mmol/L 105 100 99   CO2 mmol/L 25 24 23   ANION GAP mmol/L 11 13 15   BUN mg/dL 44* 49* 39*   CREATININE mg/dL 1.62* 1.74* 1.86*   EGFR mL/min/1.73m*2 29* 27* 25*   ALBUMIN g/dL  --   --  3.8   ALT U/L  --   --  20   AST U/L  --   --  32   BILIRUBIN TOTAL mg/dL  --   --  0.8     CBC:  Results from last 7 days   Lab Units 24  0724  0602 24  1530   WBC AUTO x10*3/uL 3.7* 3.8* 6.7   HEMOGLOBIN g/dL 10.9* 10.6* 11.4*   HEMATOCRIT % 33.1* 31.6* 34.5*   PLATELETS AUTO x10*3/uL 180 174 174   MCV fL 95 93 94     COAG:     ABO: No results found for: \"ABO\"  HEME/ENDO:  Results from last 7 days   Lab Units 24  0913   HEMOGLOBIN A1C % 8.5*      CARDIAC:   Results from last 7 days   Lab Units 24  1742 24  1530   TROPHS ng/L 62* 59*   BNP pg/mL  --  151*      Results from last 7 days   Lab Units 24  0913   HEMOGLOBIN A1C % 8.5*        Last I/O:  No intake or output data in the 24 hours ending 24 1254  Net IO Since Admission: -150 mL [24 1254]      Imaging Results:  Electrocardiogram, 12-lead PRN ACS symptoms    Result Date: " 7/1/2024  Sinus rhythm with Premature atrial complexes ST & T wave abnormality, consider lateral ischemia Abnormal ECG When compared with ECG of 25-JUN-2024 13:52, Premature atrial complexes are now Present    XR chest 1 view    Result Date: 6/29/2024  Interpreted By:  Jolly Neves, STUDY: XR CHEST 1 VIEW;  6/29/2024 3:45 pm   INDICATION: Signs/Symptoms:covid.   COMPARISON: 06/27/2024   ACCESSION NUMBER(S): [id number]   ORDERING CLINICIAN: CARRIE MORELAND   FINDINGS: AP radiograph of the chest was provided.       CARDIOMEDIASTINAL SILHOUETTE: Cardiomediastinal silhouette is stable in appearance to prior exam. Patient is status post median sternotomy.   LUNGS: Mild new left basilar atelectasis without evidence of consolidation or pleural effusion.   ABDOMEN: No remarkable upper abdominal findings.   BONES: No acute osseous changes.       1.  Mild new left basilar atelectasis without evidence of consolidation or sizable pleural effusion.       MACRO: None   Signed by: Jolly Neves 6/29/2024 4:30 PM Dictation workstation:   [id number]      Inpatient Medications:  Scheduled medications   Medication Dose Route Frequency    albuterol  2 puff inhalation TID    amiodarone  100 mg oral Daily    [Held by provider] amLODIPine  2.5 mg oral Daily    apixaban  2.5 mg oral BID    atorvastatin  80 mg oral Nightly    clopidogrel  75 mg oral Daily    furosemide  20 mg oral Once per day on Sunday Tuesday Thursday Saturday    insulin glargine  10 Units subcutaneous Nightly    insulin lispro  0-5 Units subcutaneous Before meals & nightly    isosorbide mononitrate ER  60 mg oral Daily    losartan  50 mg oral BID    metoprolol succinate XL  12.5 mg oral Nightly    pantoprazole  40 mg oral Daily before breakfast    Or    pantoprazole  40 mg intravenous Daily before breakfast    remdesivir  100 mg intravenous q24h    tiotropium  2 puff inhalation Daily     PRN medications   Medication    acetaminophen    Or     acetaminophen    Or    acetaminophen    dextrose    dextrose    glucagon    glucagon    guaiFENesin    melatonin    nitroglycerin    ondansetron    Or    ondansetron    polyethylene glycol     Continuous Medications   Medication Dose Last Rate       Outpatient Medications:  Current Outpatient Medications   Medication Instructions    amiodarone (PACERONE) 100 mg, oral, Daily    amLODIPine (NORVASC) 2.5 mg, oral, Daily    apixaban (ELIQUIS) 2.5 mg, oral, 2 times daily    atorvastatin (LIPITOR) 80 mg, oral, Nightly    blood pressure test kit-wrist kit 1 kit, miscellaneous, Daily    clopidogrel (PLAVIX) 75 mg, oral, Daily    cyanocobalamin (VITAMIN B-12) 500 mcg, oral, Daily    docusate sodium (COLACE) 100 mg, oral, 2 times daily    ferrous sulfate 65 mg, oral, Every other day, Do not crush, chew, or split.    furosemide (LASIX) 20 mg, oral, 4 times weekly, Take on Monday, Wednesday, and Friday    insulin degludec (TRESIBA FLEXTOUCH U-100) 5 Units, subcutaneous, Nightly    isosorbide mononitrate ER (IMDUR) 60 mg, oral, Daily    lancets (Accu-Chek Fastclix Lancet Drum) misc 1 Lancet, miscellaneous, 3 times daily    losartan (COZAAR) 50 mg, oral, 2 times daily, Hold if the top number for blood pressure is less then 130    metoprolol succinate XL (Toprol-XL) 25 mg 24 hr tablet 1/2 tablet by mouth daily    nitroglycerin (NITROSTAT) 0.4 mg, sublingual, Every 5 min PRN    OneTouch Ultra Test strip 3 times daily       Physical Exam:      General: Patient is awake, alert, and oriented.  Patient is in no acute distress.  HEENT:  Normocephalic.  Moist mucosa.    Neck:  Normal Jugular Venous Pressure.  Cardiovascular:  Regular rate and rhythm.  Normal S1 and S2, no murmurs, rubs or gallops  Pulmonary:  Clear to auscultation bilaterally. No wheezing  Abdomen:  Soft. Non-tender.   Non-distended.  Positive bowel sounds.  Lower Extremities:  2+ pedal pulses. 1+ LE edema  Neurologic:  Alert and oriented x3. No focal deficit.   Skin:  Skin warm and dry, Lips dry  Psychiatric: Normal affect.      Assessment/Plan     Shona Montgomery is a 89 y.o. female with past medical history of   hypertension, hyperlipidemia, CKD stage III,  p A-fib on low dose Eliquis, NICM (EF 35-40%), MR mild to mod, CAD status post CABG 2016 and prior non-ST elevation myocardial infarction (repeat cath 7 years ago showed a patent sequential LIMA with an occluded SVG-RCA) , chronic systolic heart failure, type 2 diabetes presenting to [facility] with increasing shortness of breath, found to have COVID infection.  Cardiology consulted for chest pain.    Assessment:  # COVID infection, + 6/27/2024 with hypoxia  # Chest discomfort, most likely non cardiac in nature, reproducible and worse with cough  # Chronic systolic heart failure, ICM   - dry weight is 108lbs.  Appears euvolemic  # CAD s/p CABG 2016  # Hypertension   - BP acceptable  # DM2   - HgbA1C 8.4 in 6/2024   # Elevated troponin   -  HS troponin on admit 6/29/24 > 59, 62.  Repeat troponin 7/2 > 29    Plan:  - repeat troponin this afternoon is 29.  We will send troponin x 1 again this afternoon.  - 12 lead showing SB, rate 51, no ST segment changes    If delta troponin is relatively unchanged, no further inpatient cardiology work up will be needed.    She can follow up with HILARIO Maciel as previously scheduled on 8/20/24    Code Status:  Full Code      Patt Miller, APRN-CNP

## 2024-07-02 NOTE — CARE PLAN
The patient's goals for the shift include     The clinical goals for the shift include free from falls    Over the shift, the patient is making progress toward the following goals.

## 2024-07-02 NOTE — PROGRESS NOTES
Shona Montgomery is a 89 y.o. female on day 3 of admission presenting with COVID.      Subjective   Feeling better today  Was able to eat  Does feel chest pressure  No pain  Po intake improving        Objective     Last Recorded Vitals  /66 (BP Location: Right arm, Patient Position: Lying)   Pulse 53   Temp 37.1 °C (98.8 °F) (Temporal)   Resp 17   Wt 50.3 kg (111 lb)   SpO2 95%   Intake/Output last 3 Shifts:  No intake or output data in the 24 hours ending 07/02/24 1204    Admission Weight  Weight: 50.3 kg (111 lb) (06/29/24 1455)    Daily Weight  06/29/24 : 50.3 kg (111 lb)    Image Results  Electrocardiogram, 12-lead PRN ACS symptoms  Sinus rhythm with Premature atrial complexes  ST & T wave abnormality, consider lateral ischemia  Abnormal ECG  When compared with ECG of 25-JUN-2024 13:52,  Premature atrial complexes are now Present      Physical Exam    Chest clear  CVS regular  Ext no edema  Abdo benign  Heent normal   Neck supple     Relevant Results             Assessment/Plan      Pt with   Covid   Hypoxia   Htn  Ckd  Chf     Principal Problem:    COVID    Cont with current   Seen dw daughter  Check troponin   Will ask cardio to see  Out of bed into chair         Elieser Marks MD

## 2024-07-02 NOTE — CARE PLAN
Problem: Pain - Adult  Goal: Verbalizes/displays adequate comfort level or baseline comfort level  Outcome: Progressing     Problem: Safety - Adult  Goal: Free from fall injury  Outcome: Progressing     Problem: Discharge Planning  Goal: Discharge to home or other facility with appropriate resources  Outcome: Progressing     Problem: Chronic Conditions and Co-morbidities  Goal: Patient's chronic conditions and co-morbidity symptoms are monitored and maintained or improved  Outcome: Progressing     Problem: Skin  Goal: Promote/optimize nutrition  Outcome: Progressing

## 2024-07-02 NOTE — CARE PLAN
The patient's goals for the shift include to be able to get some rest    The clinical goals for the shift include pt will remain free from injury this shift    Problem: Pain - Adult  Goal: Verbalizes/displays adequate comfort level or baseline comfort level  Outcome: Progressing     Problem: Safety - Adult  Goal: Free from fall injury  Outcome: Progressing     Problem: Skin  Goal: Promote/optimize nutrition  Outcome: Progressing

## 2024-07-02 NOTE — PROGRESS NOTES
"  INFECTIOUS DISEASE DAILY PROGRESS NOTE    SUBJECTIVE:    No overnight events. Afebrile. No rash/itching.    OBJECTIVE:  VITALS (Last 24 Hours)  /53 (BP Location: Right arm, Patient Position: Lying)   Pulse 60   Temp 37.1 °C (98.7 °F) (Temporal)   Resp 17   Ht 1.6 m (5' 2.99\")   Wt 50.3 kg (111 lb)   LMP  (LMP Unknown)   SpO2 93%   BMI 19.67 kg/m²     PHYSICAL EXAM:  Gen - NAD, in bed  Lungs - clear, no wheezing  Abd - soft, no ttp  Skin - no rash    ABX: IV Remdesivir, PO Decadron    LABS:  Lab Results   Component Value Date    WBC 3.7 (L) 07/02/2024    HGB 10.9 (L) 07/02/2024    HCT 33.1 (L) 07/02/2024    MCV 95 07/02/2024     07/02/2024     Lab Results   Component Value Date    GLUCOSE 130 (H) 07/02/2024    CALCIUM 8.7 07/02/2024     07/02/2024    K 4.1 07/02/2024    CO2 25 07/02/2024     07/02/2024    BUN 44 (H) 07/02/2024    CREATININE 1.62 (H) 07/02/2024     Results from last 72 hours   Lab Units 06/29/24  1530   ALK PHOS U/L 86   BILIRUBIN TOTAL mg/dL 0.8   PROTEIN TOTAL g/dL 6.9   ALT U/L 20   AST U/L 32   ALBUMIN g/dL 3.8     Estimated Creatinine Clearance: 16.9 mL/min (A) (by C-G formula based on SCr of 1.62 mg/dL (H)).      ASSESSMENT/PLAN:    COVID-19 Infection with Hypoxia  CKD - CrCl 16, Cr is improved still 1.6    PO Decadron 6mg/day or 10D total course. IV Remdesivir 5D course.    Off O2 now and if able to stay that way tomorrow could stop Remdesivir early and finish out Decadron course.    Monitoring for adverse effects of abx such as rash/itching.    Will sign off. Please call back with questions. Thanks! D/w Dr. Kendrick Roberson MD  [facility]  Office #[phone]      "

## 2024-07-03 LAB
ANION GAP SERPL CALC-SCNC: 12 MMOL/L (ref 10–20)
BUN SERPL-MCNC: 40 MG/DL (ref 6–23)
CALCIUM SERPL-MCNC: 8.7 MG/DL (ref 8.6–10.3)
CHLORIDE SERPL-SCNC: 106 MMOL/L (ref 98–107)
CO2 SERPL-SCNC: 26 MMOL/L (ref 21–32)
CREAT SERPL-MCNC: 1.46 MG/DL (ref 0.5–1.05)
EGFRCR SERPLBLD CKD-EPI 2021: 33 ML/MIN/1.73M*2
ERYTHROCYTE [DISTWIDTH] IN BLOOD BY AUTOMATED COUNT: 13.2 % (ref 11.5–14.5)
GLUCOSE BLD MANUAL STRIP-MCNC: 152 MG/DL (ref 74–99)
GLUCOSE BLD MANUAL STRIP-MCNC: 173 MG/DL (ref 74–99)
GLUCOSE BLD MANUAL STRIP-MCNC: 211 MG/DL (ref 74–99)
GLUCOSE BLD MANUAL STRIP-MCNC: 95 MG/DL (ref 74–99)
GLUCOSE SERPL-MCNC: 61 MG/DL (ref 74–99)
HCT VFR BLD AUTO: 31.5 % (ref 36–46)
HGB BLD-MCNC: 10.8 G/DL (ref 12–16)
MCH RBC QN AUTO: 30.8 PG (ref 26–34)
MCHC RBC AUTO-ENTMCNC: 34.3 G/DL (ref 32–36)
MCV RBC AUTO: 90 FL (ref 80–100)
NRBC BLD-RTO: 0 /100 WBCS (ref 0–0)
PLATELET # BLD AUTO: 202 X10*3/UL (ref 150–450)
POTASSIUM SERPL-SCNC: 3.4 MMOL/L (ref 3.5–5.3)
RBC # BLD AUTO: 3.51 X10*6/UL (ref 4–5.2)
SODIUM SERPL-SCNC: 141 MMOL/L (ref 136–145)
WBC # BLD AUTO: 3.8 X10*3/UL (ref 4.4–11.3)

## 2024-07-03 PROCEDURE — 2500000001 HC RX 250 WO HCPCS SELF ADMINISTERED DRUGS (ALT 637 FOR MEDICARE OP): Performed by: INTERNAL MEDICINE

## 2024-07-03 PROCEDURE — 80048 BASIC METABOLIC PNL TOTAL CA: CPT | Performed by: INTERNAL MEDICINE

## 2024-07-03 PROCEDURE — 82947 ASSAY GLUCOSE BLOOD QUANT: CPT

## 2024-07-03 PROCEDURE — 94668 MNPJ CHEST WALL SBSQ: CPT

## 2024-07-03 PROCEDURE — 1200000002 HC GENERAL ROOM WITH TELEMETRY DAILY

## 2024-07-03 PROCEDURE — 9420000001 HC RT PATIENT EDUCATION 5 MIN

## 2024-07-03 PROCEDURE — 2500000004 HC RX 250 GENERAL PHARMACY W/ HCPCS (ALT 636 FOR OP/ED): Performed by: INTERNAL MEDICINE

## 2024-07-03 PROCEDURE — 94640 AIRWAY INHALATION TREATMENT: CPT

## 2024-07-03 PROCEDURE — 2500000002 HC RX 250 W HCPCS SELF ADMINISTERED DRUGS (ALT 637 FOR MEDICARE OP, ALT 636 FOR OP/ED): Performed by: INTERNAL MEDICINE

## 2024-07-03 PROCEDURE — 2500000004 HC RX 250 GENERAL PHARMACY W/ HCPCS (ALT 636 FOR OP/ED): Performed by: FAMILY MEDICINE

## 2024-07-03 PROCEDURE — 36415 COLL VENOUS BLD VENIPUNCTURE: CPT | Performed by: INTERNAL MEDICINE

## 2024-07-03 PROCEDURE — 2500000004 HC RX 250 GENERAL PHARMACY W/ HCPCS (ALT 636 FOR OP/ED): Mod: JZ | Performed by: INTERNAL MEDICINE

## 2024-07-03 PROCEDURE — 85027 COMPLETE CBC AUTOMATED: CPT | Performed by: INTERNAL MEDICINE

## 2024-07-03 RX ORDER — PEN NEEDLE, DIABETIC 32GX 5/32"
NEEDLE, DISPOSABLE MISCELLANEOUS
COMMUNITY
Start: 2024-06-26

## 2024-07-03 RX ORDER — MORPHINE SULFATE 2 MG/ML
1 INJECTION, SOLUTION INTRAMUSCULAR; INTRAVENOUS ONCE
Status: COMPLETED | OUTPATIENT
Start: 2024-07-03 | End: 2024-07-03

## 2024-07-03 RX ADMIN — Medication 3 MG: at 20:25

## 2024-07-03 RX ADMIN — METOPROLOL SUCCINATE 12.5 MG: 25 TABLET, EXTENDED RELEASE ORAL at 23:00

## 2024-07-03 RX ADMIN — CLOPIDOGREL 75 MG: 75 TABLET ORAL at 09:12

## 2024-07-03 RX ADMIN — MORPHINE SULFATE 1 MG: 2 INJECTION, SOLUTION INTRAMUSCULAR; INTRAVENOUS at 12:57

## 2024-07-03 RX ADMIN — ALBUTEROL SULFATE 2 PUFF: 90 AEROSOL, METERED RESPIRATORY (INHALATION) at 07:57

## 2024-07-03 RX ADMIN — ACETAMINOPHEN 650 MG: 650 LIQUID ORAL at 12:43

## 2024-07-03 RX ADMIN — ALBUTEROL SULFATE 2 PUFF: 90 AEROSOL, METERED RESPIRATORY (INHALATION) at 13:06

## 2024-07-03 RX ADMIN — LOSARTAN POTASSIUM 50 MG: 50 TABLET, FILM COATED ORAL at 09:12

## 2024-07-03 RX ADMIN — AMIODARONE HYDROCHLORIDE 100 MG: 200 TABLET ORAL at 09:12

## 2024-07-03 RX ADMIN — ALBUTEROL SULFATE 2 PUFF: 90 AEROSOL, METERED RESPIRATORY (INHALATION) at 20:46

## 2024-07-03 RX ADMIN — ISOSORBIDE MONONITRATE 60 MG: 30 TABLET, EXTENDED RELEASE ORAL at 09:11

## 2024-07-03 RX ADMIN — AMLODIPINE BESYLATE 2.5 MG: 2.5 TABLET ORAL at 09:12

## 2024-07-03 RX ADMIN — PANTOPRAZOLE SODIUM 40 MG: 40 TABLET, DELAYED RELEASE ORAL at 09:17

## 2024-07-03 RX ADMIN — APIXABAN 2.5 MG: 2.5 TABLET, FILM COATED ORAL at 09:11

## 2024-07-03 RX ADMIN — TIOTROPIUM BROMIDE INHALATION SPRAY 2 PUFF: 3.12 SPRAY, METERED RESPIRATORY (INHALATION) at 07:58

## 2024-07-03 RX ADMIN — INSULIN GLARGINE 10 UNITS: 100 INJECTION, SOLUTION SUBCUTANEOUS at 23:14

## 2024-07-03 RX ADMIN — ATORVASTATIN CALCIUM 80 MG: 80 TABLET, FILM COATED ORAL at 20:25

## 2024-07-03 RX ADMIN — INSULIN LISPRO 2 UNITS: 100 INJECTION, SOLUTION INTRAVENOUS; SUBCUTANEOUS at 12:43

## 2024-07-03 RX ADMIN — GUAIFENESIN 600 MG: 600 TABLET ORAL at 20:25

## 2024-07-03 RX ADMIN — REMDESIVIR 100 MG: 100 INJECTION, POWDER, LYOPHILIZED, FOR SOLUTION INTRAVENOUS at 09:11

## 2024-07-03 RX ADMIN — ONDANSETRON 4 MG: 2 INJECTION INTRAMUSCULAR; INTRAVENOUS at 12:58

## 2024-07-03 RX ADMIN — LOSARTAN POTASSIUM 50 MG: 50 TABLET, FILM COATED ORAL at 20:25

## 2024-07-03 RX ADMIN — APIXABAN 2.5 MG: 2.5 TABLET, FILM COATED ORAL at 20:25

## 2024-07-03 ASSESSMENT — PAIN DESCRIPTION - DESCRIPTORS: DESCRIPTORS: ACHING

## 2024-07-03 ASSESSMENT — PAIN - FUNCTIONAL ASSESSMENT
PAIN_FUNCTIONAL_ASSESSMENT: 0-10
PAIN_FUNCTIONAL_ASSESSMENT: 0-10

## 2024-07-03 ASSESSMENT — PAIN DESCRIPTION - LOCATION
LOCATION: GENERALIZED
LOCATION: GENERALIZED

## 2024-07-03 ASSESSMENT — COGNITIVE AND FUNCTIONAL STATUS - GENERAL
TURNING FROM BACK TO SIDE WHILE IN FLAT BAD: A LITTLE
PERSONAL GROOMING: A LITTLE
TOILETING: A LITTLE
MOVING TO AND FROM BED TO CHAIR: A LITTLE
TOILETING: A LITTLE
MOVING FROM LYING ON BACK TO SITTING ON SIDE OF FLAT BED WITH BEDRAILS: A LITTLE
MOBILITY SCORE: 18
DAILY ACTIVITIY SCORE: 18
HELP NEEDED FOR BATHING: A LITTLE
STANDING UP FROM CHAIR USING ARMS: A LITTLE
CLIMB 3 TO 5 STEPS WITH RAILING: A LOT
EATING MEALS: A LITTLE
WALKING IN HOSPITAL ROOM: A LITTLE
PERSONAL GROOMING: A LITTLE
DRESSING REGULAR UPPER BODY CLOTHING: A LITTLE
DRESSING REGULAR LOWER BODY CLOTHING: A LITTLE
DAILY ACTIVITIY SCORE: 18
MOVING TO AND FROM BED TO CHAIR: A LITTLE
HELP NEEDED FOR BATHING: A LITTLE
EATING MEALS: A LITTLE
MOVING FROM LYING ON BACK TO SITTING ON SIDE OF FLAT BED WITH BEDRAILS: A LITTLE
DRESSING REGULAR UPPER BODY CLOTHING: A LITTLE
WALKING IN HOSPITAL ROOM: A LOT
CLIMB 3 TO 5 STEPS WITH RAILING: A LITTLE
STANDING UP FROM CHAIR USING ARMS: A LITTLE
DRESSING REGULAR LOWER BODY CLOTHING: A LITTLE
MOBILITY SCORE: 16
TURNING FROM BACK TO SIDE WHILE IN FLAT BAD: A LITTLE

## 2024-07-03 ASSESSMENT — PAIN SCALES - GENERAL
PAINLEVEL_OUTOF10: 3
PAINLEVEL_OUTOF10: 0 - NO PAIN
PAINLEVEL_OUTOF10: 6
PAINLEVEL_OUTOF10: 8

## 2024-07-03 ASSESSMENT — PAIN SCALES - PAIN ASSESSMENT IN ADVANCED DEMENTIA (PAINAD): TOTALSCORE: MEDICATION (SEE MAR)

## 2024-07-03 NOTE — PROGRESS NOTES
07/03/24 1043   Discharge Planning   Patient expects to be discharged to: Home     Once med ready plan is to discharge home, patient still needs PT/OT eval for safe discharge home reached out to provider for referral to be placed, patient on IV remdesivir, will continue to monitor for discharge planning.

## 2024-07-03 NOTE — CARE PLAN
The patient's goals for the shift include to be able to get some rest    The clinical goals for the shift include Pt. Safety will be maintained throughout shift.

## 2024-07-03 NOTE — PROGRESS NOTES
"Pharmacy Medication History Review    Shona Montgomery is a 89 y.o. female admitted for COVID. Pharmacy reviewed the patient's [id number] medications and allergies for accuracy.    The list below reflectives the updated PTA list. Please review each medication in order reconciliation for additional clarification and justification.  Prior to Admission Medications   Prescriptions Last Dose Informant     BD Skye 2nd Gen Pen Needle 32 gauge x \" needle Past Week      Sig: USE ONCE DAILY AS DIRECTED   OneTouch Ultra Test strip Past Week      Sig: 3 times a day.   amLODIPine (Norvasc) 2.5 mg tablet Past Week      Sig: Take 1 tablet (2.5 mg) by mouth once daily.   amiodarone (Pacerone) 100 mg tablet Past Week      Sig: Take 1 tablet (100 mg) by mouth once daily.   apixaban (Eliquis) 2.5 mg tablet Past Week      Sig: Take 1 tablet (2.5 mg) by mouth 2 times a day.   atorvastatin (Lipitor) 80 mg tablet Past Week      Sig: Take 1 tablet (80 mg) by mouth once daily at bedtime.   blood pressure test kit-wrist kit Past Week      Si kit once daily.   clopidogrel (Plavix) 75 mg tablet Past Week      Sig: Take 1 tablet (75 mg) by mouth once daily.   cyanocobalamin (Vitamin B-12) 500 mcg tablet Past Week      Sig: Take 1 tablet (500 mcg) by mouth once daily.   docusate sodium (Colace) 100 mg capsule Past Week      Sig: Take 1 capsule (100 mg) by mouth 2 times a day.   ferrous sulfate 325 (65 Fe) MG EC tablet Past Week      Sig: Take 65 mg by mouth every other day. Do not crush, chew, or split.   furosemide (Lasix) 20 mg tablet       Sig: Take 1 tablet (20 mg) by mouth 4 times a week. Take on Monday, Wednesday, and Friday   insulin degludec (Tresiba FlexTouch U-100) 100 unit/mL (3 mL) injection       Sig: Inject 5 Units under the skin once daily at bedtime.   isosorbide mononitrate ER (Imdur) 60 mg 24 hr tablet Past Week      Sig: Take 1 tablet (60 mg) by mouth once daily.   lancets (Accu-Chek Fastclix Lancet Drum) misc Past " "Week      Si Lancet 3 times a day.   losartan (Cozaar) 100 mg tablet Past Week      Sig: Take 0.5 tablets (50 mg) by mouth 2 times a day. Hold if the top number for blood pressure is less then 130   metoprolol succinate XL (Toprol-XL) 25 mg 24 hr tablet Past Week      Si/2 tablet by mouth daily   Patient taking differently: Take 0.5 tablets (12.5 mg) by mouth once daily at bedtime. 1/2 tablet by mouth daily   nitroglycerin (Nitrostat) 0.4 mg SL tablet       Sig: Place 1 tablet (0.4 mg) under the tongue every 5 minutes if needed for chest pain.      Facility-Administered Medications: None      Allergies  Reviewed by Gavi Mariano on 7/3/2024        Severity Reactions Comments    Tramadol Not Specified Other \"psychadelic Lights\"            Below are additional concerns with the patient's PTA list.  [facility] and [facility] mail order verified medications and doses as well as most recent fills.    Gavi Mariano    "

## 2024-07-03 NOTE — CARE PLAN
Problem: Pain - Adult  Goal: Verbalizes/displays adequate comfort level or baseline comfort level  Outcome: Progressing     Problem: Safety - Adult  Goal: Free from fall injury  Outcome: Progressing     Problem: Discharge Planning  Goal: Discharge to home or other facility with appropriate resources  Outcome: Progressing     Problem: Chronic Conditions and Co-morbidities  Goal: Patient's chronic conditions and co-morbidity symptoms are monitored and maintained or improved  Outcome: Progressing     Problem: Skin  Goal: Promote/optimize nutrition  Outcome: Progressing  Flowsheets (Taken 7/2/2024 2340)  Promote/optimize nutrition:   Consume > 50% meals/supplements   Offer water/supplements/favorite foods   Assist with feeding

## 2024-07-04 LAB
ANION GAP SERPL CALC-SCNC: 12 MMOL/L (ref 10–20)
BUN SERPL-MCNC: 46 MG/DL (ref 6–23)
CALCIUM SERPL-MCNC: 8.6 MG/DL (ref 8.6–10.3)
CHLORIDE SERPL-SCNC: 106 MMOL/L (ref 98–107)
CO2 SERPL-SCNC: 26 MMOL/L (ref 21–32)
CREAT SERPL-MCNC: 1.7 MG/DL (ref 0.5–1.05)
EGFRCR SERPLBLD CKD-EPI 2021: 28 ML/MIN/1.73M*2
ERYTHROCYTE [DISTWIDTH] IN BLOOD BY AUTOMATED COUNT: 13.4 % (ref 11.5–14.5)
GLUCOSE BLD MANUAL STRIP-MCNC: 169 MG/DL (ref 74–99)
GLUCOSE BLD MANUAL STRIP-MCNC: 192 MG/DL (ref 74–99)
GLUCOSE SERPL-MCNC: 134 MG/DL (ref 74–99)
HCT VFR BLD AUTO: 30.7 % (ref 36–46)
HGB BLD-MCNC: 10.2 G/DL (ref 12–16)
MCH RBC QN AUTO: 31.2 PG (ref 26–34)
MCHC RBC AUTO-ENTMCNC: 33.2 G/DL (ref 32–36)
MCV RBC AUTO: 94 FL (ref 80–100)
NRBC BLD-RTO: 0 /100 WBCS (ref 0–0)
PLATELET # BLD AUTO: 205 X10*3/UL (ref 150–450)
POTASSIUM SERPL-SCNC: 4 MMOL/L (ref 3.5–5.3)
RBC # BLD AUTO: 3.27 X10*6/UL (ref 4–5.2)
SODIUM SERPL-SCNC: 140 MMOL/L (ref 136–145)
WBC # BLD AUTO: 4.9 X10*3/UL (ref 4.4–11.3)

## 2024-07-04 PROCEDURE — 1200000002 HC GENERAL ROOM WITH TELEMETRY DAILY

## 2024-07-04 PROCEDURE — 82947 ASSAY GLUCOSE BLOOD QUANT: CPT

## 2024-07-04 PROCEDURE — 2500000001 HC RX 250 WO HCPCS SELF ADMINISTERED DRUGS (ALT 637 FOR MEDICARE OP): Performed by: INTERNAL MEDICINE

## 2024-07-04 PROCEDURE — 80048 BASIC METABOLIC PNL TOTAL CA: CPT | Performed by: INTERNAL MEDICINE

## 2024-07-04 PROCEDURE — 2500000005 HC RX 250 GENERAL PHARMACY W/O HCPCS: Mod: JZ | Performed by: INTERNAL MEDICINE

## 2024-07-04 PROCEDURE — 2500000004 HC RX 250 GENERAL PHARMACY W/ HCPCS (ALT 636 FOR OP/ED): Performed by: INTERNAL MEDICINE

## 2024-07-04 PROCEDURE — 2500000001 HC RX 250 WO HCPCS SELF ADMINISTERED DRUGS (ALT 637 FOR MEDICARE OP): Performed by: FAMILY MEDICINE

## 2024-07-04 PROCEDURE — 36415 COLL VENOUS BLD VENIPUNCTURE: CPT | Performed by: INTERNAL MEDICINE

## 2024-07-04 PROCEDURE — 94640 AIRWAY INHALATION TREATMENT: CPT

## 2024-07-04 PROCEDURE — 2500000002 HC RX 250 W HCPCS SELF ADMINISTERED DRUGS (ALT 637 FOR MEDICARE OP, ALT 636 FOR OP/ED): Performed by: INTERNAL MEDICINE

## 2024-07-04 PROCEDURE — 85027 COMPLETE CBC AUTOMATED: CPT | Performed by: INTERNAL MEDICINE

## 2024-07-04 PROCEDURE — 94668 MNPJ CHEST WALL SBSQ: CPT

## 2024-07-04 RX ORDER — LANOLIN ALCOHOL/MO/W.PET/CERES
100 CREAM (GRAM) TOPICAL DAILY
Status: DISCONTINUED | OUTPATIENT
Start: 2024-07-04 | End: 2024-07-06 | Stop reason: HOSPADM

## 2024-07-04 RX ADMIN — ALBUTEROL SULFATE 2 PUFF: 90 AEROSOL, METERED RESPIRATORY (INHALATION) at 12:44

## 2024-07-04 RX ADMIN — APIXABAN 2.5 MG: 2.5 TABLET, FILM COATED ORAL at 22:28

## 2024-07-04 RX ADMIN — REMDESIVIR 100 MG: 100 INJECTION, POWDER, LYOPHILIZED, FOR SOLUTION INTRAVENOUS at 11:12

## 2024-07-04 RX ADMIN — APIXABAN 2.5 MG: 2.5 TABLET, FILM COATED ORAL at 11:13

## 2024-07-04 RX ADMIN — Medication 100 MG: at 11:13

## 2024-07-04 RX ADMIN — AMIODARONE HYDROCHLORIDE 100 MG: 200 TABLET ORAL at 11:13

## 2024-07-04 RX ADMIN — AMLODIPINE BESYLATE 2.5 MG: 2.5 TABLET ORAL at 11:13

## 2024-07-04 RX ADMIN — ALBUTEROL SULFATE 2 PUFF: 90 AEROSOL, METERED RESPIRATORY (INHALATION) at 07:37

## 2024-07-04 RX ADMIN — LOSARTAN POTASSIUM 50 MG: 50 TABLET, FILM COATED ORAL at 22:28

## 2024-07-04 RX ADMIN — INSULIN LISPRO 1 UNITS: 100 INJECTION, SOLUTION INTRAVENOUS; SUBCUTANEOUS at 17:00

## 2024-07-04 RX ADMIN — PANTOPRAZOLE SODIUM 40 MG: 40 TABLET, DELAYED RELEASE ORAL at 11:13

## 2024-07-04 RX ADMIN — LOSARTAN POTASSIUM 50 MG: 50 TABLET, FILM COATED ORAL at 11:13

## 2024-07-04 RX ADMIN — ALBUTEROL SULFATE 2 PUFF: 90 AEROSOL, METERED RESPIRATORY (INHALATION) at 22:12

## 2024-07-04 RX ADMIN — FUROSEMIDE 20 MG: 20 TABLET ORAL at 11:16

## 2024-07-04 RX ADMIN — METOPROLOL SUCCINATE 12.5 MG: 25 TABLET, EXTENDED RELEASE ORAL at 22:28

## 2024-07-04 RX ADMIN — INSULIN GLARGINE 10 UNITS: 100 INJECTION, SOLUTION SUBCUTANEOUS at 22:18

## 2024-07-04 RX ADMIN — INSULIN LISPRO 1 UNITS: 100 INJECTION, SOLUTION INTRAVENOUS; SUBCUTANEOUS at 22:18

## 2024-07-04 RX ADMIN — ISOSORBIDE MONONITRATE 60 MG: 30 TABLET, EXTENDED RELEASE ORAL at 11:13

## 2024-07-04 RX ADMIN — ATORVASTATIN CALCIUM 80 MG: 80 TABLET, FILM COATED ORAL at 22:27

## 2024-07-04 RX ADMIN — CLOPIDOGREL 75 MG: 75 TABLET ORAL at 11:13

## 2024-07-04 RX ADMIN — TIOTROPIUM BROMIDE INHALATION SPRAY 2 PUFF: 3.12 SPRAY, METERED RESPIRATORY (INHALATION) at 07:32

## 2024-07-04 ASSESSMENT — COGNITIVE AND FUNCTIONAL STATUS - GENERAL
CLIMB 3 TO 5 STEPS WITH RAILING: A LITTLE
PERSONAL GROOMING: A LITTLE
STANDING UP FROM CHAIR USING ARMS: A LITTLE
MOVING TO AND FROM BED TO CHAIR: A LITTLE
PERSONAL GROOMING: A LITTLE
TURNING FROM BACK TO SIDE WHILE IN FLAT BAD: A LITTLE
MOBILITY SCORE: 18
CLIMB 3 TO 5 STEPS WITH RAILING: A LITTLE
MOVING FROM LYING ON BACK TO SITTING ON SIDE OF FLAT BED WITH BEDRAILS: A LITTLE
HELP NEEDED FOR BATHING: A LITTLE
WALKING IN HOSPITAL ROOM: A LITTLE
DRESSING REGULAR UPPER BODY CLOTHING: A LITTLE
STANDING UP FROM CHAIR USING ARMS: A LITTLE
HELP NEEDED FOR BATHING: A LITTLE
MOVING TO AND FROM BED TO CHAIR: A LITTLE
MOBILITY SCORE: 18
DRESSING REGULAR UPPER BODY CLOTHING: A LITTLE
MOVING FROM LYING ON BACK TO SITTING ON SIDE OF FLAT BED WITH BEDRAILS: A LITTLE
EATING MEALS: A LITTLE
DAILY ACTIVITIY SCORE: 18
DRESSING REGULAR LOWER BODY CLOTHING: A LITTLE
WALKING IN HOSPITAL ROOM: A LITTLE
DAILY ACTIVITIY SCORE: 18
TOILETING: A LITTLE
DRESSING REGULAR LOWER BODY CLOTHING: A LITTLE
TOILETING: A LITTLE
TURNING FROM BACK TO SIDE WHILE IN FLAT BAD: A LITTLE
EATING MEALS: A LITTLE

## 2024-07-04 ASSESSMENT — PAIN SCALES - GENERAL
PAINLEVEL_OUTOF10: 0 - NO PAIN
PAINLEVEL_OUTOF10: 0 - NO PAIN

## 2024-07-04 NOTE — NURSING NOTE
Upon arrival at the patients bedside the patient was AO+4, ABC's intact, no noted distress, Gcs=15, very weak and pale and willing to participate in head to toe assessment. The patient remained hds , no sob, + cough, + BLE tenderness, no pain, remained stable without an increase in O2 demand. Rounding ensued care transferred to the oncoming RN with the patient asleep.

## 2024-07-04 NOTE — PROGRESS NOTES
Shona Montgomery is a 89 y.o. female on day 4 of admission presenting with COVID.      Subjective   She did have pain in the whole body ealrier today and tylenol did nto help  Did get morphine 1 mg  Currently resting   No chest pain   Breathing ok  Somewhat sleepy   Son by bedside       Objective     Last Recorded Vitals  /55   Pulse 55   Temp 36.4 °C (97.5 °F)   Resp 16   Wt 50.3 kg (111 lb)   SpO2 97%   Intake/Output last 3 Shifts:      Admission Weight  Weight: 50.3 kg (111 lb) (06/29/24 1455)    Daily Weight  06/29/24 : 50.3 kg (111 lb)    Image Results  ECG 12 Lead  Sinus bradycardia  Nonspecific T wave abnormality  Abnormal ECG  Confirmed by Mohsen Mendiola ([id number]) on 7/2/2024 5:48:41 PM      Physical Exam    Chest clear  CVS regular  Ext no edema  Abdo benign  Heent normal   Neck supple   resting    Relevant Results           Cbc bmp noted  Assessment/Plan      Pt with   Covid   Hypoxia   Htn  Ckd  Chf   Body aches could be related to covid     Principal Problem:    COVID    Cont with current   Seen dw son  From cardio no further workup  Ot and pt         Elieser Marks MD

## 2024-07-04 NOTE — CARE PLAN
Problem: Pain - Adult  Goal: Verbalizes/displays adequate comfort level or baseline comfort level  Outcome: Progressing     Problem: Safety - Adult  Goal: Free from fall injury  Outcome: Progressing     Problem: Discharge Planning  Goal: Discharge to home or other facility with appropriate resources  Outcome: Progressing     Problem: Chronic Conditions and Co-morbidities  Goal: Patient's chronic conditions and co-morbidity symptoms are monitored and maintained or improved  Outcome: Progressing

## 2024-07-04 NOTE — CARE PLAN
The patient's goals for the shift include to be able to get some rest    The clinical goals for the shift include Shona will not have any respiratory distress this shift

## 2024-07-05 LAB
ANION GAP SERPL CALC-SCNC: 13 MMOL/L (ref 10–20)
BUN SERPL-MCNC: 43 MG/DL (ref 6–23)
CALCIUM SERPL-MCNC: 9.1 MG/DL (ref 8.6–10.3)
CHLORIDE SERPL-SCNC: 103 MMOL/L (ref 98–107)
CO2 SERPL-SCNC: 26 MMOL/L (ref 21–32)
CREAT SERPL-MCNC: 1.65 MG/DL (ref 0.5–1.05)
EGFRCR SERPLBLD CKD-EPI 2021: 29 ML/MIN/1.73M*2
ERYTHROCYTE [DISTWIDTH] IN BLOOD BY AUTOMATED COUNT: 13.2 % (ref 11.5–14.5)
GLUCOSE BLD MANUAL STRIP-MCNC: 118 MG/DL (ref 74–99)
GLUCOSE BLD MANUAL STRIP-MCNC: 156 MG/DL (ref 74–99)
GLUCOSE BLD MANUAL STRIP-MCNC: 212 MG/DL (ref 74–99)
GLUCOSE BLD MANUAL STRIP-MCNC: 214 MG/DL (ref 74–99)
GLUCOSE SERPL-MCNC: 102 MG/DL (ref 74–99)
HCT VFR BLD AUTO: 34.5 % (ref 36–46)
HGB BLD-MCNC: 11.2 G/DL (ref 12–16)
MCH RBC QN AUTO: 30.4 PG (ref 26–34)
MCHC RBC AUTO-ENTMCNC: 32.5 G/DL (ref 32–36)
MCV RBC AUTO: 94 FL (ref 80–100)
NRBC BLD-RTO: 0 /100 WBCS (ref 0–0)
PLATELET # BLD AUTO: 288 X10*3/UL (ref 150–450)
POTASSIUM SERPL-SCNC: 3.3 MMOL/L (ref 3.5–5.3)
RBC # BLD AUTO: 3.68 X10*6/UL (ref 4–5.2)
SODIUM SERPL-SCNC: 139 MMOL/L (ref 136–145)
WBC # BLD AUTO: 6.3 X10*3/UL (ref 4.4–11.3)

## 2024-07-05 PROCEDURE — 94668 MNPJ CHEST WALL SBSQ: CPT

## 2024-07-05 PROCEDURE — 97165 OT EVAL LOW COMPLEX 30 MIN: CPT | Mod: GO

## 2024-07-05 PROCEDURE — 2500000002 HC RX 250 W HCPCS SELF ADMINISTERED DRUGS (ALT 637 FOR MEDICARE OP, ALT 636 FOR OP/ED): Performed by: INTERNAL MEDICINE

## 2024-07-05 PROCEDURE — 94640 AIRWAY INHALATION TREATMENT: CPT

## 2024-07-05 PROCEDURE — 1200000002 HC GENERAL ROOM WITH TELEMETRY DAILY

## 2024-07-05 PROCEDURE — 2500000001 HC RX 250 WO HCPCS SELF ADMINISTERED DRUGS (ALT 637 FOR MEDICARE OP): Performed by: INTERNAL MEDICINE

## 2024-07-05 PROCEDURE — 2500000001 HC RX 250 WO HCPCS SELF ADMINISTERED DRUGS (ALT 637 FOR MEDICARE OP): Performed by: FAMILY MEDICINE

## 2024-07-05 PROCEDURE — 36415 COLL VENOUS BLD VENIPUNCTURE: CPT | Performed by: INTERNAL MEDICINE

## 2024-07-05 PROCEDURE — 80048 BASIC METABOLIC PNL TOTAL CA: CPT | Performed by: INTERNAL MEDICINE

## 2024-07-05 PROCEDURE — 97161 PT EVAL LOW COMPLEX 20 MIN: CPT | Mod: GP

## 2024-07-05 PROCEDURE — 85027 COMPLETE CBC AUTOMATED: CPT | Performed by: INTERNAL MEDICINE

## 2024-07-05 PROCEDURE — 82947 ASSAY GLUCOSE BLOOD QUANT: CPT

## 2024-07-05 PROCEDURE — 97535 SELF CARE MNGMENT TRAINING: CPT | Mod: GO

## 2024-07-05 RX ORDER — ALBUTEROL SULFATE 90 UG/1
2 AEROSOL, METERED RESPIRATORY (INHALATION) EVERY 2 HOUR PRN
Status: DISCONTINUED | OUTPATIENT
Start: 2024-07-05 | End: 2024-07-06 | Stop reason: HOSPADM

## 2024-07-05 RX ADMIN — LOSARTAN POTASSIUM 50 MG: 50 TABLET, FILM COATED ORAL at 20:28

## 2024-07-05 RX ADMIN — ALBUTEROL SULFATE 2 PUFF: 90 AEROSOL, METERED RESPIRATORY (INHALATION) at 21:37

## 2024-07-05 RX ADMIN — AMLODIPINE BESYLATE 2.5 MG: 2.5 TABLET ORAL at 10:06

## 2024-07-05 RX ADMIN — TIOTROPIUM BROMIDE INHALATION SPRAY 2 PUFF: 3.12 SPRAY, METERED RESPIRATORY (INHALATION) at 08:09

## 2024-07-05 RX ADMIN — APIXABAN 2.5 MG: 2.5 TABLET, FILM COATED ORAL at 10:08

## 2024-07-05 RX ADMIN — CLOPIDOGREL 75 MG: 75 TABLET ORAL at 10:09

## 2024-07-05 RX ADMIN — LOSARTAN POTASSIUM 50 MG: 50 TABLET, FILM COATED ORAL at 10:09

## 2024-07-05 RX ADMIN — INSULIN LISPRO 2 UNITS: 100 INJECTION, SOLUTION INTRAVENOUS; SUBCUTANEOUS at 17:54

## 2024-07-05 RX ADMIN — APIXABAN 2.5 MG: 2.5 TABLET, FILM COATED ORAL at 20:28

## 2024-07-05 RX ADMIN — ISOSORBIDE MONONITRATE 60 MG: 30 TABLET, EXTENDED RELEASE ORAL at 10:08

## 2024-07-05 RX ADMIN — INSULIN LISPRO 2 UNITS: 100 INJECTION, SOLUTION INTRAVENOUS; SUBCUTANEOUS at 13:59

## 2024-07-05 RX ADMIN — METOPROLOL SUCCINATE 12.5 MG: 25 TABLET, EXTENDED RELEASE ORAL at 20:28

## 2024-07-05 RX ADMIN — ATORVASTATIN CALCIUM 80 MG: 80 TABLET, FILM COATED ORAL at 20:29

## 2024-07-05 RX ADMIN — Medication 100 MG: at 10:08

## 2024-07-05 RX ADMIN — ALBUTEROL SULFATE 2 PUFF: 90 AEROSOL, METERED RESPIRATORY (INHALATION) at 08:08

## 2024-07-05 RX ADMIN — INSULIN GLARGINE 10 UNITS: 100 INJECTION, SOLUTION SUBCUTANEOUS at 21:00

## 2024-07-05 RX ADMIN — AMIODARONE HYDROCHLORIDE 100 MG: 200 TABLET ORAL at 10:06

## 2024-07-05 RX ADMIN — ALBUTEROL SULFATE 2 PUFF: 90 AEROSOL, METERED RESPIRATORY (INHALATION) at 14:07

## 2024-07-05 ASSESSMENT — COGNITIVE AND FUNCTIONAL STATUS - GENERAL
EATING MEALS: A LITTLE
PERSONAL GROOMING: A LITTLE
DRESSING REGULAR LOWER BODY CLOTHING: A LITTLE
MOVING FROM LYING ON BACK TO SITTING ON SIDE OF FLAT BED WITH BEDRAILS: A LITTLE
DAILY ACTIVITIY SCORE: 18
STANDING UP FROM CHAIR USING ARMS: A LITTLE
MOVING FROM LYING ON BACK TO SITTING ON SIDE OF FLAT BED WITH BEDRAILS: A LITTLE
TOILETING: A LITTLE
DAILY ACTIVITIY SCORE: 18
HELP NEEDED FOR BATHING: A LITTLE
PERSONAL GROOMING: A LITTLE
CLIMB 3 TO 5 STEPS WITH RAILING: A LOT
MOVING TO AND FROM BED TO CHAIR: A LITTLE
DRESSING REGULAR UPPER BODY CLOTHING: A LITTLE
TURNING FROM BACK TO SIDE WHILE IN FLAT BAD: A LITTLE
MOBILITY SCORE: 17
DRESSING REGULAR LOWER BODY CLOTHING: A LOT
TOILETING: A LITTLE
CLIMB 3 TO 5 STEPS WITH RAILING: A LITTLE
MOVING TO AND FROM BED TO CHAIR: A LITTLE
DRESSING REGULAR UPPER BODY CLOTHING: A LITTLE
WALKING IN HOSPITAL ROOM: A LITTLE
HELP NEEDED FOR BATHING: A LITTLE
MOBILITY SCORE: 18
WALKING IN HOSPITAL ROOM: A LITTLE
STANDING UP FROM CHAIR USING ARMS: A LITTLE
TURNING FROM BACK TO SIDE WHILE IN FLAT BAD: A LITTLE

## 2024-07-05 ASSESSMENT — ACTIVITIES OF DAILY LIVING (ADL)
ADL_ASSISTANCE: NEEDS ASSISTANCE
HOME_MANAGEMENT_TIME_ENTRY: 16
ADL_ASSISTANCE: NEEDS ASSISTANCE

## 2024-07-05 ASSESSMENT — PAIN - FUNCTIONAL ASSESSMENT
PAIN_FUNCTIONAL_ASSESSMENT: 0-10
PAIN_FUNCTIONAL_ASSESSMENT: 0-10

## 2024-07-05 ASSESSMENT — PAIN SCALES - GENERAL
PAINLEVEL_OUTOF10: 0 - NO PAIN
PAINLEVEL_OUTOF10: 0 - NO PAIN

## 2024-07-05 NOTE — PROGRESS NOTES
Occupational Therapy    Evaluation    Patient Name: Shona Montgomery  MRN: [id number]  Today's Date: 7/5/2024  Time Calculation  Start Time: 1031  Stop Time: 1057  Time Calculation (min): 26 min        Assessment:  OT Assessment:  (Pt admitted with COVID, currently close to baseline for ADLs and functional mobility. Pt completed supervision/SBA for safety with ADLs, anticipate pt progress quickly back to being independent. Would recommend continued therapy at discharge.)  Prognosis: Good  Barriers to Discharge: None  Evaluation/Treatment Tolerance: Patient tolerated treatment well  Medical Staff Made Aware: Yes  End of Session Communication: Bedside nurse  End of Session Patient Position: Up in chair, Alarm off, not on at start of session (family at bedside)  OT Assessment Results: Decreased ADL status, Decreased cognition, Decreased sensation, Decreased functional mobility, Decreased IADLs  Prognosis: Good  Barriers to Discharge: None  Evaluation/Treatment Tolerance: Patient tolerated treatment well  Medical Staff Made Aware: Yes  Strengths: Ability to acquire knowledge, Capable of completing ADLs semi/independent, Housing layout, Living arrangement secure, Premorbid level of function, Support of Caregivers, Support of extended family/friends  Plan:  Treatment Interventions: ADL retraining, Functional transfer training, Endurance training, UE strengthening/ROM, Equipment evaluation/education  OT Frequency: 3 times per week  OT Discharge Recommendations: Low intensity level of continued care  OT - OK to Discharge: Yes  Treatment Interventions: ADL retraining, Functional transfer training, Endurance training, UE strengthening/ROM, Equipment evaluation/education    Subjective   Current Problem:  1. COVID        2. Troponin level elevated          General:  General  Reason for Referral: COVID  Referred By: Kendrick  Past Medical History Relevant to Rehab:   Past Medical History:   Diagnosis Date    Atherosclerotic heart  disease of native coronary artery without angina pectoris 04/20/2022    Atherosclerotic heart disease of native coronary artery without angina pectoris    Personal history of other diseases of the circulatory system 09/16/2013    History of hypertension       Family/Caregiver Present: Yes  Caregiver Feedback:  (family reports her and siblings rotate who spends the night with patient at home. Patient is alone during the day but has family checking in during the day and someone is at the home from around 5pm-9am. Faily provides groceries and assist with IADLs.)  Prior to Session Communication: Bedside nurse  Patient Position Received: Up in chair, Alarm off, not on at start of session  Preferred Learning Style: auditory, verbal  General Comment:  (pt up in chair upon entry, family at bedside, agreeable to therapy.)  Precautions:  Hearing/Visual Limitations:  (has reading glasses, not in room.)  Medical Precautions: Fall precautions  Vital Signs:     Pain:  Pain Assessment  Pain Assessment: 0-10  0-10 (Numeric) Pain Score: 0 - No pain    Objective   Cognition:  Overall Cognitive Status: Within Functional Limits  Attention: Within Functional Limits  Memory: Exceptions to WFL  Long-Term Memory:  (appears to be minimal LTM deficits, family in room supplying information at times, or correcting pt with provided informationj.)  Problem Solving: Within Functional Limits  Safety/Judgement: Exceptions to WFL  Insight: Mild  Impulsive: Within functional limits           Home Living:  Type of Home: House  Lives With: Alone (children stay at night and check in on pt during the day but pt is typically alone from 9am-5pm)  Home Adaptive Equipment: Walker rolling or standard  Home Layout: Two level, Bed/bath upstairs  Bathroom Shower/Tub: Tub/shower unit  Bathroom Toilet: Standard  Bathroom Equipment: Grab bars in shower, Hand-held shower hose, Tub transfer bench, Bedside commode  Home Living Comments:  (family reports having all AE  that pt may need)  Prior Function:  Level of [place]: Needs assistance with ADLs, Needs assistance with homemaking  Receives Help From: Family  ADL Assistance: Needs assistance  Bath:  (pt reports needing assist with shower transfer and washing back and feet)  Homemaking Assistance: Needs assistance  Homemaking Assistance Comments:  (family assists)  Ambulatory Assistance: Independent  IADL History:  Homemaking Responsibilities: Yes  Meal Prep Responsibility:  (pt responsible for breakfast and lunch, family provides dinner)  ADL:  Grooming Assistance: Stand by  Grooming Deficit: Supervision/safety, Wash/dry hands  Toileting Assistance with Device: Stand by  Toileting Deficit: Supervison/safety, Bedside commode, Grab bar use  Functional Assistance: Stand by  Functional Deficit: Supervision/safety, Commode transfer  ADL Comments:  (no LOB noted, pt able to complete toileting and hand hygiene at sink with supervision for safety)  Activity Tolerance:  Endurance: Endurance does not limit participation in activity  Bed Mobility/Transfers:      Transfers  Transfer: Yes  Transfer 1  Technique 1: Sit to stand, Stand to sit  Transfer Device 1: Walker  Transfer Level of Assistance 1: Close supervision  Trials/Comments 1:  (verbal cues for hand placement on arm rests for sit<>stand)  Transfers 2  Technique 2: Sit to stand, Stand to sit  Transfer Device 2: Walker  Transfer Level of Assistance 2: Close supervision  Trials/Comments 2:  (verbal cues for hand placement with use of grab bar from raised toilet)      Functional Mobility:  Functional Mobility  Functional Mobility Performed: Yes (functional mobility to/from restroom with supervision for safety. use of FWW, no LOB noted)  Sitting Balance:     Standing Balance:  Dynamic Standing Balance  Dynamic Standing-Balance Support: No upper extremity supported  Dynamic Standing-Balance: Forward lean  Dynamic Standing-Comments:  (hand hygiene at sink, no LOB noted)         Vision:Vision - Basic Assessment  Current Vision: Wears glasses only for reading  Visual History: Cataracts, Corrective eye surgery  Sensation:  Light Touch:  (pt reports numbness/tingling in bilateral UE digits thumb-5th; reports this is not new)  Strength:  Strength Comments:  (WFL)       Extremities: RUE   RUE :  (shoulder flexion ~110 degrees, elbow distally WFL) and LUE   LUE:  (shoulder flexion ~110 degrees, elbow distally WFL)        Outcome Measures:[facility] Daily Activity  Putting on and taking off regular lower body clothing: A lot  Bathing (including washing, rinsing, drying): A little  Putting on and taking off regular upper body clothing: A little  Toileting, which includes using toilet, bedpan or urinal: A little  Taking care of personal grooming such as brushing teeth: A little  Eating Meals: None  Daily Activity - Total Score: 18        Education Documentation  Body Mechanics, taught by Crista Kline OT at 7/5/2024 12:47 PM.  Learner: Patient  Readiness: Acceptance  Method: Explanation  Response: Verbalizes Understanding, Needs Reinforcement    ADL Training, taught by Crista Kline OT at 7/5/2024 12:47 PM.  Learner: Patient  Readiness: Acceptance  Method: Explanation  Response: Verbalizes Understanding, Needs Reinforcement    Education Comments  No comments found.           Goals:  Encounter Problems       Encounter Problems (Active)       ADLs       Patient with complete upper body dressing with independent level of assistance donning and doffing all UE clothes with PRN adaptive equipment while edge of bed        Start:  07/05/24    Expected End:  07/19/24            Patient with complete lower body dressing with independent level of assistance donning and doffing all LE clothes  with PRN adaptive equipment while edge of bed        Start:  07/05/24    Expected End:  07/19/24            Patient will complete daily grooming tasks brushing teeth and washing face/hair with independent level of  assistance and PRN adaptive equipment while standing.       Start:  07/05/24    Expected End:  07/19/24            Patient will complete toileting including hygiene clothing management/hygiene with independent level of assistance and raised toilet seat and grab bars.       Start:  07/05/24    Expected End:  07/19/24

## 2024-07-05 NOTE — PROGRESS NOTES
07/05/24 1136   Discharge Planning   Patient expects to be discharged to: Home vs SNF     Patient not med ready for DC plan on discharge is home however patient lives home alone does have 7 children that help her with daily ADL's and meals, patient's daughter does understand patient may need to go to SNF on discharge , patient is on IV Remdesivir and needs PT/OT eval before safe discharge home can be planned,    14:11 per notes OT recommending low score of 18

## 2024-07-05 NOTE — PROGRESS NOTES
Shona Montgomery is a 89 y.o. female on day 4 of admission presenting with COVID.      Subjective   She did have pain in the whole body yesterday   Did get morphine 1 mg  Currently resting   No chest pain   Breathing ok  Somewhat sleepy   Son by bedside       Objective     Last Recorded Vitals  /56   Pulse 63   Temp 36.4 °C (97.6 °F) (Oral)   Resp (!) 27   Wt 48.6 kg (107 lb 2.3 oz)   SpO2 94%   Intake/Output last 3 Shifts:      Admission Weight  Weight: 50.3 kg (111 lb) (06/29/24 1455)    Daily Weight  07/04/24 : 48.6 kg (107 lb 2.3 oz)    Image Results  ECG 12 Lead  Sinus bradycardia  Nonspecific T wave abnormality  Abnormal ECG  Confirmed by Mohsen Mendiola ([id number]) on 7/2/2024 5:48:41 PM      Physical Exam    Chest clear  CVS regular  Ext no edema  Abdo benign  Heent normal   Neck supple   resting    Relevant Results           Cbc bmp noted  Assessment/Plan      Pt with   Covid   Hypoxia   Htn  Ckd  Chf   Body aches could be related to covid     Principal Problem:    COVID    Cont with current   Seen dw daughter  From cardio no further workup  Ot and pt         Elieser Marks MD

## 2024-07-05 NOTE — CARE PLAN
Problem: Pain - Adult  Goal: Verbalizes/displays adequate comfort level or baseline comfort level  Outcome: Progressing     Problem: Safety - Adult  Goal: Free from fall injury  Outcome: Progressing     Problem: Discharge Planning  Goal: Discharge to home or other facility with appropriate resources  Outcome: Progressing     Problem: Chronic Conditions and Co-morbidities  Goal: Patient's chronic conditions and co-morbidity symptoms are monitored and maintained or improved  Outcome: Progressing     Problem: Skin  Goal: Promote/optimize nutrition  Outcome: Progressing  Flowsheets (Taken 7/5/2024 0111)  Promote/optimize nutrition: Consume > 50% meals/supplements

## 2024-07-05 NOTE — PROGRESS NOTES
Physical Therapy    Physical Therapy Evaluation    Patient Name: Shona Montgomery  MRN: [id number]  Today's Date: 7/5/2024   Time Calculation  Start Time: 1321  Stop Time: 1347  Time Calculation (min): 26 min    Assessment/Plan   PT Assessment  PT Assessment Results: Decreased strength, Decreased endurance, Impaired balance, Decreased mobility  Rehab Prognosis: Good  Barriers to Discharge: Pending stair training with steps to access bed/bathroom  Evaluation/Treatment Tolerance: Patient tolerated treatment well  Medical Staff Made Aware: Yes  Strengths: Ability to acquire knowledge, Access to adaptive/assistive products, Support of extended family/friends  Barriers to Participation:  (None identifed)  End of Session Communication: Bedside nurse  Assessment Comment: Pt presents today with good functional BLE strength, fair balance, and fair activity tolerance. Pt would benefit from continued PT to improve the above factors and attempt stair negotiation as medically appropriate to bring the pt closer to the PLOF.  End of Session Patient Position: Up in chair, Alarm off, not on at start of session  IP OR SWING BED PT PLAN  Inpatient or Swing Bed: Inpatient  PT Plan  Treatment/Interventions: Bed mobility, Transfer training, Gait training, Stair training, Balance training, Strengthening, Therapeutic exercise, Therapeutic activity, Home exercise program  PT Plan: Ongoing PT  PT Frequency: 3 times per week  PT Discharge Recommendations: Low intensity level of continued care  Equipment Recommended upon Discharge:  (Pt owns a wheeled walker)  PT Recommended Transfer Status: Assistive device (Supervision)  PT - OK to Discharge: Yes (Per PT POC)    Subjective   General Visit Information:  General  Reason for Referral: 89 y/o F presenting with shortness of breath and hypoxia. Pt admitted for elevated troponin and treatment of COVID  Referred By: MARIIA Marks  Past Medical History Relevant to Rehab: HTn, HLD, a-fib, CAD s/p CABG,  HFrEF/NICM, DMT, CKD, anemia, CHF  Family/Caregiver Present: Yes  Caregiver Feedback: Daughter present and involved in care  Prior to Session Communication: Bedside nurse  Patient Position Received: Up in chair, Alarm on  Preferred Learning Style: auditory, kinesthetic, visual  General Comment: Pt up in chair upin PT arrival. Cleared to participate with RN, and agreeable to PT evaluation.  Home Living:  Home Living  Type of Home: House  Lives With: Alone  Home Adaptive Equipment: Walker rolling or standard  Home Layout: Two level, Bed/bath upstairs, Stairs to alternate level with rails  Alternate Level Stairs-Rails:  (Single HR)  Alternate Level Stairs-Number of Steps: Full flight to second level  Home Access: Stairs to enter without rails  Entrance Stairs-Rails: None  Entrance Stairs-Number of Steps: 1  Bathroom Shower/Tub: Tub/shower unit  Bathroom Toilet: Standard  Bathroom Equipment: Grab bars in shower, Hand-held shower hose, Tub transfer bench, Bedside commode  Prior Level of Function:  Prior Function Per Pt/Caregiver Report  Level of [place]: Needs assistance with ADLs, Needs assistance with homemaking  Receives Help From: Family (Pt and daughter report pt's children take shifts staying with pt overnight. Family present from 9 am to 5 pm)  ADL Assistance: Needs assistance  Bath:  (Pt reports family assists with bathing back and feet)  Homemaking Assistance:  (Family completes)  Ambulatory Assistance: Independent  Prior Function Comments: Pt denies recent falls  Precautions:  Precautions  Medical Precautions: Fall precautions, Infection precautions (Droplet+ precautions)    Objective   Pain:  Pain Assessment  Pain Assessment: 0-10  0-10 (Numeric) Pain Score: 0 - No pain  Pain Location: Other (Comment) (Pt denies pain, but reports general body ache)  Cognition:  Cognition  Orientation Level: Oriented X4    General Assessments:  Activity Tolerance  Endurance: Endurance does not limit participation in  activity    Sensation  Sensation Comment: Pt reports chronic numbness in the fingers bilaterally    Coordination  Movements are Fluid and Coordinated: Yes    Postural Control  Head Control: Mild forward head posture in standing  Posture Comment: Mild rounded shoulders in standing with FWW support    Static Sitting Balance  Static Sitting-Balance Support: Bilateral upper extremity supported, Feet supported  Static Sitting-Level of Assistance: Modified independent  Static Sitting-Comment/Number of Minutes: With posterior trunk support of recliner chair.    Static Standing Balance  Static Standing-Balance Support: Bilateral upper extremity supported  Static Standing-Level of Assistance: Close supervision  Static Standing-Comment/Number of Minutes: With FWW support  Dynamic Standing Balance  Dynamic Standing-Balance Support: Bilateral upper extremity supported  Dynamic Standing-Comments: Supervision with FWW support  Functional Assessments:       Transfers  Transfer: Yes  Transfer 1  Transfer From 1: Chair with arms to  Transfer to 1: Stand  Technique 1: Sit to stand  Transfer Device 1: Walker, Gait belt  Transfer Level of Assistance 1: Close supervision  Trials/Comments 1: Good safety awareness and body mechanics with pt demonstrating foot placement flat on the floor. Pt also intiates BUE push from the armrests of the chair to  a reasonable amount of time. Ptreports a feeling of fogginess in standing  Transfers 2  Transfer From 2: Stand to  Transfer to 2: Chair with arms  Technique 2: Stand to sit  Transfer Device 2: Walker, Gait belt  Transfer Level of Assistance 2: Close supervision  Trials/Comments 2: Pt demonstrates good safety awareness with BLE positioning against the chair before attempting to sit. Pt also able to intiate BUE reach for armrests of the chair when sitting.    Ambulation/Gait Training  Ambulation/Gait Training Performed: Yes  Ambulation/Gait Training 1  Surface 1: Level tile  Device 1:  Rolling walker  Gait Support Devices: Gait belt  Assistance 1: Close supervision  Comments/Distance (ft) 1: About 40 ft with a step-through pattern. Fair haylie and step length. No overt LOB observed.    Stairs  Stairs: No  Extremity/Trunk Assessments:  RLE   RLE :  (Grossly 4/5 MMT)  LLE   LLE :  (Grossly 4/5 MMT)  Outcome Measures:  [facility] Basic Mobility  Turning from your back to your side while in a flat bed without using bedrails: A little  Moving from lying on your back to sitting on the side of a flat bed without using bedrails: A little  Moving to and from bed to chair (including a wheelchair): A little  Standing up from a chair using your arms (e.g. wheelchair or bedside chair): A little  To walk in hospital room: A little  Climbing 3-5 steps with railing: A lot  Basic Mobility - Total Score: 17    Encounter Problems       Encounter Problems (Active)       Balance       Goal 1       Start:  07/05/24    Expected End:  07/19/24       Pt performs all sitting and standing balance mod IND using LRAD            Mobility       STG - Patient will navigate a full flight of steps with single HR support and supervision       Start:  07/05/24    Expected End:  07/19/24            STG - Patient will ambulate       Start:  07/05/24    Expected End:  07/19/24       100 ft with supervision using LRAD            PT Problem       PT Goal 1       Start:  07/05/24    Expected End:  07/19/24       Pt demonstrates IND in performing 2 set sof 12 reps of prescribed BLE HEP            PT Transfers       STG - Patient to transfer to and from sit to supine       Start:  07/05/24    Expected End:  07/19/24       IND         STG - Patient will transfer sit to and from stand       Start:  07/05/24    Expected End:  07/19/24       Mod IND using LRAD              Education Documentation  Body Mechanics, taught by Karthik Lockwood, PT at 7/5/2024  2:52 PM.  Learner: Patient  Readiness: Acceptance  Method: Explanation,  Demonstration  Response: Verbalizes Understanding, Demonstrated Understanding    Mobility Training, taught by Karthik Lockwood PT at 7/5/2024  2:52 PM.  Learner: Patient  Readiness: Acceptance  Method: Explanation, Demonstration  Response: Verbalizes Understanding, Demonstrated Understanding    Education Comments  No comments found.

## 2024-07-06 VITALS
TEMPERATURE: 97.8 F | WEIGHT: 107.14 LBS | OXYGEN SATURATION: 100 % | DIASTOLIC BLOOD PRESSURE: 64 MMHG | HEART RATE: 61 BPM | HEIGHT: 63 IN | SYSTOLIC BLOOD PRESSURE: 116 MMHG | BODY MASS INDEX: 18.98 KG/M2 | RESPIRATION RATE: 18 BRPM

## 2024-07-06 LAB
ANION GAP SERPL CALC-SCNC: 13 MMOL/L (ref 10–20)
ATRIAL RATE: 76 BPM
BUN SERPL-MCNC: 49 MG/DL (ref 6–23)
CALCIUM SERPL-MCNC: 8.8 MG/DL (ref 8.6–10.3)
CHLORIDE SERPL-SCNC: 104 MMOL/L (ref 98–107)
CO2 SERPL-SCNC: 27 MMOL/L (ref 21–32)
CREAT SERPL-MCNC: 1.83 MG/DL (ref 0.5–1.05)
EGFRCR SERPLBLD CKD-EPI 2021: 25 ML/MIN/1.73M*2
ERYTHROCYTE [DISTWIDTH] IN BLOOD BY AUTOMATED COUNT: 13.2 % (ref 11.5–14.5)
GLUCOSE BLD MANUAL STRIP-MCNC: 108 MG/DL (ref 74–99)
GLUCOSE BLD MANUAL STRIP-MCNC: 168 MG/DL (ref 74–99)
GLUCOSE BLD MANUAL STRIP-MCNC: 188 MG/DL (ref 74–99)
GLUCOSE SERPL-MCNC: 108 MG/DL (ref 74–99)
HCT VFR BLD AUTO: 30.5 % (ref 36–46)
HGB BLD-MCNC: 10.2 G/DL (ref 12–16)
MCH RBC QN AUTO: 30.9 PG (ref 26–34)
MCHC RBC AUTO-ENTMCNC: 33.4 G/DL (ref 32–36)
MCV RBC AUTO: 92 FL (ref 80–100)
NRBC BLD-RTO: 0 /100 WBCS (ref 0–0)
P OFFSET: 181 MS
P ONSET: 148 MS
PLATELET # BLD AUTO: 277 X10*3/UL (ref 150–450)
POTASSIUM SERPL-SCNC: 3.5 MMOL/L (ref 3.5–5.3)
PR INTERVAL: 138 MS
Q ONSET: 217 MS
QRS COUNT: 13 BEATS
QRS DURATION: 110 MS
QT INTERVAL: 356 MS
QTC CALCULATION(BAZETT): 400 MS
QTC FREDERICIA: 385 MS
R AXIS: -1 DEGREES
RBC # BLD AUTO: 3.3 X10*6/UL (ref 4–5.2)
SODIUM SERPL-SCNC: 140 MMOL/L (ref 136–145)
T AXIS: 156 DEGREES
T OFFSET: 395 MS
VENTRICULAR RATE: 76 BPM
WBC # BLD AUTO: 5.4 X10*3/UL (ref 4.4–11.3)

## 2024-07-06 PROCEDURE — 2500000002 HC RX 250 W HCPCS SELF ADMINISTERED DRUGS (ALT 637 FOR MEDICARE OP, ALT 636 FOR OP/ED): Performed by: INTERNAL MEDICINE

## 2024-07-06 PROCEDURE — 2500000004 HC RX 250 GENERAL PHARMACY W/ HCPCS (ALT 636 FOR OP/ED): Performed by: INTERNAL MEDICINE

## 2024-07-06 PROCEDURE — 2500000001 HC RX 250 WO HCPCS SELF ADMINISTERED DRUGS (ALT 637 FOR MEDICARE OP): Performed by: INTERNAL MEDICINE

## 2024-07-06 PROCEDURE — 36415 COLL VENOUS BLD VENIPUNCTURE: CPT | Performed by: INTERNAL MEDICINE

## 2024-07-06 PROCEDURE — 2500000004 HC RX 250 GENERAL PHARMACY W/ HCPCS (ALT 636 FOR OP/ED): Performed by: FAMILY MEDICINE

## 2024-07-06 PROCEDURE — 2500000001 HC RX 250 WO HCPCS SELF ADMINISTERED DRUGS (ALT 637 FOR MEDICARE OP): Performed by: FAMILY MEDICINE

## 2024-07-06 PROCEDURE — 80048 BASIC METABOLIC PNL TOTAL CA: CPT | Performed by: INTERNAL MEDICINE

## 2024-07-06 PROCEDURE — 82947 ASSAY GLUCOSE BLOOD QUANT: CPT

## 2024-07-06 PROCEDURE — 94640 AIRWAY INHALATION TREATMENT: CPT

## 2024-07-06 PROCEDURE — 85027 COMPLETE CBC AUTOMATED: CPT | Performed by: INTERNAL MEDICINE

## 2024-07-06 RX ORDER — LANOLIN ALCOHOL/MO/W.PET/CERES
100 CREAM (GRAM) TOPICAL DAILY
Qty: 30 TABLET | Refills: 0 | Status: SHIPPED | OUTPATIENT
Start: 2024-07-06 | End: 2024-08-05

## 2024-07-06 RX ORDER — ONDANSETRON 4 MG/1
4 TABLET, FILM COATED ORAL EVERY 8 HOURS PRN
Qty: 20 TABLET | Refills: 0 | Status: SHIPPED | OUTPATIENT
Start: 2024-07-06

## 2024-07-06 RX ORDER — DEXAMETHASONE 6 MG/1
6 TABLET ORAL DAILY
Qty: 3 TABLET | Refills: 0 | Status: SHIPPED | OUTPATIENT
Start: 2024-07-06 | End: 2024-07-09

## 2024-07-06 RX ORDER — DEXAMETHASONE 6 MG/1
6 TABLET ORAL DAILY
Status: DISCONTINUED | OUTPATIENT
Start: 2024-07-06 | End: 2024-07-06 | Stop reason: HOSPADM

## 2024-07-06 RX ADMIN — FUROSEMIDE 20 MG: 20 TABLET ORAL at 11:27

## 2024-07-06 RX ADMIN — CLOPIDOGREL 75 MG: 75 TABLET ORAL at 11:27

## 2024-07-06 RX ADMIN — Medication 100 MG: at 11:26

## 2024-07-06 RX ADMIN — ISOSORBIDE MONONITRATE 60 MG: 30 TABLET, EXTENDED RELEASE ORAL at 11:28

## 2024-07-06 RX ADMIN — ALBUTEROL SULFATE 2 PUFF: 90 AEROSOL, METERED RESPIRATORY (INHALATION) at 07:44

## 2024-07-06 RX ADMIN — INSULIN LISPRO 1 UNITS: 100 INJECTION, SOLUTION INTRAVENOUS; SUBCUTANEOUS at 13:54

## 2024-07-06 RX ADMIN — INSULIN LISPRO 1 UNITS: 100 INJECTION, SOLUTION INTRAVENOUS; SUBCUTANEOUS at 17:25

## 2024-07-06 RX ADMIN — TIOTROPIUM BROMIDE INHALATION SPRAY 2 PUFF: 3.12 SPRAY, METERED RESPIRATORY (INHALATION) at 07:44

## 2024-07-06 RX ADMIN — PANTOPRAZOLE SODIUM 40 MG: 40 TABLET, DELAYED RELEASE ORAL at 11:27

## 2024-07-06 RX ADMIN — AMIODARONE HYDROCHLORIDE 100 MG: 200 TABLET ORAL at 11:27

## 2024-07-06 RX ADMIN — DEXAMETHASONE 6 MG: 6 TABLET ORAL at 11:28

## 2024-07-06 RX ADMIN — ALBUTEROL SULFATE 2 PUFF: 90 AEROSOL, METERED RESPIRATORY (INHALATION) at 12:36

## 2024-07-06 RX ADMIN — LOSARTAN POTASSIUM 50 MG: 50 TABLET, FILM COATED ORAL at 11:27

## 2024-07-06 RX ADMIN — APIXABAN 2.5 MG: 2.5 TABLET, FILM COATED ORAL at 11:27

## 2024-07-06 RX ADMIN — POLYETHYLENE GLYCOL 3350 17 G: 17 POWDER, FOR SOLUTION ORAL at 11:26

## 2024-07-06 RX ADMIN — AMLODIPINE BESYLATE 2.5 MG: 2.5 TABLET ORAL at 11:26

## 2024-07-06 ASSESSMENT — COGNITIVE AND FUNCTIONAL STATUS - GENERAL
TOILETING: A LITTLE
WALKING IN HOSPITAL ROOM: A LITTLE
TURNING FROM BACK TO SIDE WHILE IN FLAT BAD: A LITTLE
HELP NEEDED FOR BATHING: A LITTLE
MOBILITY SCORE: 17
DAILY ACTIVITIY SCORE: 18
MOVING FROM LYING ON BACK TO SITTING ON SIDE OF FLAT BED WITH BEDRAILS: A LITTLE
STANDING UP FROM CHAIR USING ARMS: A LITTLE
MOVING TO AND FROM BED TO CHAIR: A LITTLE
PERSONAL GROOMING: A LITTLE
CLIMB 3 TO 5 STEPS WITH RAILING: A LOT
DRESSING REGULAR UPPER BODY CLOTHING: A LITTLE
DRESSING REGULAR LOWER BODY CLOTHING: A LOT

## 2024-07-06 ASSESSMENT — PAIN SCALES - GENERAL: PAINLEVEL_OUTOF10: 0 - NO PAIN

## 2024-07-06 NOTE — PROGRESS NOTES
Shona Montgomery is a 89 y.o. female on day 4 of admission presenting with COVID.      Subjective   No pain   Does feel weak overall   Po intake improving   No chest pain   Breathign ok  On RA  No nasuea vomiting        Objective     Last Recorded Vitals  /56 (BP Location: Right arm, Patient Position: Lying)   Pulse 59   Temp 36.6 °C (97.8 °F) (Oral)   Resp 15   Wt 48.6 kg (107 lb 2.3 oz)   SpO2 95%   Intake/Output last 3 Shifts:      Admission Weight  Weight: 50.3 kg (111 lb) (06/29/24 1455)    Daily Weight  07/04/24 : 48.6 kg (107 lb 2.3 oz)    Image Results  ECG 12 Lead  Sinus bradycardia  Nonspecific T wave abnormality  Abnormal ECG  Confirmed by Mohsen Mendiola ([id number]) on 7/2/2024 5:48:41 PM      Physical Exam    Chest clear  CVS regular  Ext no edema  Abdo benign  Heent normal   Neck supple   Sitting in chair    Relevant Results           Cbc bmp noted  Assessment/Plan      Pt with   Covid   Hypoxia   Htn  Ckd  Chf   Body aches could be related to covid     Principal Problem:    COVID    Cont with current   Seen dw daughter  From cardio no further workup  Ot and pt  BP controlled  Check orthos  Dc plan for home tomorrow         Elieser Marks MD

## 2024-07-06 NOTE — CARE PLAN
Problem: Pain - Adult  Goal: Verbalizes/displays adequate comfort level or baseline comfort level  Outcome: Met     Problem: Safety - Adult  Goal: Free from fall injury  Outcome: Met     Problem: Discharge Planning  Goal: Discharge to home or other facility with appropriate resources  Outcome: Met     Problem: Chronic Conditions and Co-morbidities  Goal: Patient's chronic conditions and co-morbidity symptoms are monitored and maintained or improved  Outcome: Met     Problem: Skin  Goal: Promote/optimize nutrition  Outcome: Met

## 2024-07-06 NOTE — NURSING NOTE
Upon arrival the patient was sitting up in a chair AO+4, vss, pale , with a non productive cough only complaints of being cold. The patient participated in head to toe assessment,  ABC's intact no noted distress up w/ 1 and a walker rounding ensued care transferred without incidence.

## 2024-07-07 ENCOUNTER — TELEPHONE (OUTPATIENT)
Dept: HOME HEALTH SERVICES | Facility: HOME HEALTH | Age: 89
End: 2024-07-07
Payer: MEDICARE

## 2024-07-07 ENCOUNTER — HOME HEALTH ADMISSION (OUTPATIENT)
Dept: HOME HEALTH SERVICES | Facility: HOME HEALTH | Age: 89
End: 2024-07-07
Payer: MEDICARE

## 2024-07-07 NOTE — TELEPHONE ENCOUNTER
Hi you referred this pt to [facility]. Due to staffing we can not see this pt until 7/10/24 if the team is ok with 7/10/24 SOC can you please have them update the referral to Requested start of care date to 7/10/24, if pt need to be seen sooner you will have to refer to another agency thanks

## 2024-07-08 ENCOUNTER — TELEPHONE (OUTPATIENT)
Dept: CARDIOLOGY | Facility: CLINIC | Age: 89
End: 2024-07-08
Payer: MEDICARE

## 2024-07-08 ENCOUNTER — DOCUMENTATION (OUTPATIENT)
Dept: HOME HEALTH SERVICES | Facility: HOME HEALTH | Age: 89
End: 2024-07-08
Payer: MEDICARE

## 2024-07-08 ENCOUNTER — TELEPHONE (OUTPATIENT)
Dept: PRIMARY CARE | Facility: CLINIC | Age: 89
End: 2024-07-08
Payer: MEDICARE

## 2024-07-08 NOTE — HH CARE COORDINATION
Home Care received a Referral for Nursing, Physical Therapy, and Occupational Therapy. Patient Discharged on 7.6.2024, No response received from previous Nurses In Box sent. We have processed the referral for a Start of Care on 7.10.2024 or 7.11.2024 Per Previous Intake Nurses Note.     If you have any questions or concerns, please feel free to contact us at [phone]. Follow the prompts, enter your five digit zip code, and you will be directed to your care team on [employer].

## 2024-07-08 NOTE — PROGRESS NOTES
07/08/24 1041   Discharge Planning   Patient expects to be discharged to: home     I talked to the patient's Daughter Jessica Pierson at [phone] in regards to HHC, referral shows' pending status, and that SOC would be 7/10, I did explain this to the patient's daughter she was ok with staring services on 7/10 but was hopefull someone could come out sooner.    15:41 I spoke to the patient's daughter Jessica Pierson she stated [facility] called her can could not send anyone out until Friday, patient's daughter is agreeable to using external HHC, I did email her a list of facilities at [email or web address] for her to look over.

## 2024-07-09 DIAGNOSIS — I10 PRIMARY HYPERTENSION: ICD-10-CM

## 2024-07-09 RX ORDER — LOSARTAN POTASSIUM 100 MG/1
50 TABLET ORAL 2 TIMES DAILY
Qty: 90 TABLET | Refills: 3 | Status: SHIPPED | OUTPATIENT
Start: 2024-07-09 | End: 2025-07-09

## 2024-07-09 NOTE — PROGRESS NOTES
07/09/24 0849   Discharge Planning   Patient expects to be discharged to: home with [facility]     Received an email back this morning from patient's daughter Jessica Pierson stating that she will just go with [facility] and not worry about the external referral, advised MD to switch referral back to Internal [facility], reached back out to [facility] to advise them daughter wants to stay with [facility] and will accept the Friday SOC time,

## 2024-07-10 ENCOUNTER — TELEPHONE (OUTPATIENT)
Dept: ENDOCRINOLOGY | Facility: CLINIC | Age: 89
End: 2024-07-10
Payer: MEDICARE

## 2024-07-10 NOTE — TELEPHONE ENCOUNTER
/Patient daughter called in pts blood sugar     Pt daughter wanted you to know that pt was on a course dexamethasone from hospital that she completed yesterday.     Pt is on tresiba 16U     07/10/24 b-202  07/09/24 b-253 d-350 ( was taken 30 mins after eating)  07/08/24 d-317

## 2024-07-11 ENCOUNTER — TELEPHONE (OUTPATIENT)
Dept: ENDOCRINOLOGY | Facility: CLINIC | Age: 89
End: 2024-07-11
Payer: MEDICARE

## 2024-07-11 ENCOUNTER — HOME CARE VISIT (OUTPATIENT)
Dept: HOME HEALTH SERVICES | Facility: HOME HEALTH | Age: 89
End: 2024-07-11
Payer: MEDICARE

## 2024-07-11 VITALS
TEMPERATURE: 98.6 F | RESPIRATION RATE: 16 BRPM | DIASTOLIC BLOOD PRESSURE: 70 MMHG | HEART RATE: 60 BPM | BODY MASS INDEX: 19.14 KG/M2 | WEIGHT: 104 LBS | SYSTOLIC BLOOD PRESSURE: 130 MMHG | HEIGHT: 62 IN | OXYGEN SATURATION: 99 %

## 2024-07-11 PROCEDURE — 169592 NO-PAY CLAIM PROCEDURE

## 2024-07-11 PROCEDURE — G0299 HHS/HOSPICE OF RN EA 15 MIN: HCPCS | Mod: HHH

## 2024-07-11 ASSESSMENT — ACTIVITIES OF DAILY LIVING (ADL)
ENTERING_EXITING_HOME: ONE PERSON
OASIS_M1830: 03
AMBULATION ASSISTANCE: ONE PERSON

## 2024-07-11 ASSESSMENT — ENCOUNTER SYMPTOMS
PERSON REPORTING PAIN: PATIENT
DENIES PAIN: 1
CHANGE IN APPETITE: UNCHANGED
FORGETFULNESS: 1
MUSCLE WEAKNESS: 1
APPETITE LEVEL: GOOD

## 2024-07-11 NOTE — TELEPHONE ENCOUNTER
Patient daughter called in with blood sugar as is concerned that tresiba 16u may be too much as pts blood sugar was 94 this morning    07/11/24-94  07/10/24 b-202 d-220

## 2024-07-12 ENCOUNTER — HOME CARE VISIT (OUTPATIENT)
Dept: HOME HEALTH SERVICES | Facility: HOME HEALTH | Age: 89
End: 2024-07-12
Payer: MEDICARE

## 2024-07-12 VITALS
HEART RATE: 58 BPM | TEMPERATURE: 98.4 F | OXYGEN SATURATION: 96 % | SYSTOLIC BLOOD PRESSURE: 98 MMHG | DIASTOLIC BLOOD PRESSURE: 56 MMHG

## 2024-07-12 PROCEDURE — G0152 HHCP-SERV OF OT,EA 15 MIN: HCPCS | Mod: HHH

## 2024-07-12 ASSESSMENT — ACTIVITIES OF DAILY LIVING (ADL)
PREPARING MEALS: DEPENDENT
BATHING_CURRENT_FUNCTION: MAXIMUM ASSIST
TOILETING: CONTACT GUARD ASSIST
DRESSING_UB_CURRENT_FUNCTION: MODERATE ASSIST
DRESSING_LB_CURRENT_FUNCTION: MAXIMUM ASSIST
TOILETING: MINIMUM ASSIST
TOILETING: 1
BATHING ASSESSED: 1

## 2024-07-12 ASSESSMENT — ENCOUNTER SYMPTOMS
FATIGUE: 1
PAIN LOCATION: RIGHT KNEE
PAIN LOCATION - PAIN SEVERITY: 0/10
OCCASIONAL FEELINGS OF UNSTEADINESS: 1
TINGLING: 1

## 2024-07-13 ENCOUNTER — HOME CARE VISIT (OUTPATIENT)
Dept: HOME HEALTH SERVICES | Facility: HOME HEALTH | Age: 89
End: 2024-07-13
Payer: MEDICARE

## 2024-07-13 PROCEDURE — G0151 HHCP-SERV OF PT,EA 15 MIN: HCPCS | Mod: HHH

## 2024-07-13 SDOH — HEALTH STABILITY: PHYSICAL HEALTH: EXERCISE TYPE: SKILLED THERAPEUTIC EXERCISES

## 2024-07-13 SDOH — HEALTH STABILITY: PHYSICAL HEALTH: PHYSICAL EXERCISE: SITTING, STANDING

## 2024-07-13 SDOH — HEALTH STABILITY: PHYSICAL HEALTH: EXERCISE ACTIVITY: GAIT AND STAIR TRAINING

## 2024-07-13 SDOH — HEALTH STABILITY: PHYSICAL HEALTH: EXERCISE ACTIVITY: SKILLED THERAPEUTIC EXERCISES

## 2024-07-13 SDOH — HEALTH STABILITY: PHYSICAL HEALTH: EXERCISE ACTIVITIES SETS: 2

## 2024-07-13 SDOH — HEALTH STABILITY: PHYSICAL HEALTH: PHYSICAL EXERCISE: 10

## 2024-07-13 SDOH — HEALTH STABILITY: PHYSICAL HEALTH: RESISTANCE: AS TOLERATED

## 2024-07-13 SDOH — HEALTH STABILITY: PHYSICAL HEALTH: EXERCISE ACTIVITY: ENDURANCE TRAINING

## 2024-07-13 ASSESSMENT — GAIT ASSESSMENTS
TRUNK SCORE: 1
PATH: 1 - MILD/MODERATE DEVIATION OR USES WALKING AID
GAIT SCORE: 6
STEP SYMMETRY: 0 - RIGHT AND LEFT STEP LENGTH NOT EQUAL
WALKING STANCE: 0 - HEELS APART
BALANCE AND GAIT SCORE: 12
PATH SCORE: 1
TRUNK: 1 - NO SWAY BUT FLEXION OF KNEES OR BACK OR SPREADS ARMS WHILE WALKING
STEP CONTINUITY: 0 - STOPPING OR DISCONTINUITY BETWEEN STEPS
INITIATION OF GAIT IMMEDIATELY AFTER GO: 0 - ANY HESITANCY OR MULTIPLE ATTEMPTS TO START

## 2024-07-13 ASSESSMENT — ENCOUNTER SYMPTOMS
PERSON REPORTING PAIN: PATIENT
ARTHRALGIAS: 1
DENIES PAIN: 1
LIMITED RANGE OF MOTION: 1
MUSCLE WEAKNESS: 1

## 2024-07-13 ASSESSMENT — BALANCE ASSESSMENTS
ATTEMPTS TO ARISE: 1 - ABLE, REQUIRES MORE THAN ONE ATTEMPT
SITTING BALANCE: 1 - STEADY, SAFE
TURNING 360 DEGREES STEPS: 0 - DISCONTINUOUS STEPS
ARISING SCORE: 1
EYES CLOSED AT MAXIMUM POSITION NUDGED: 0 - UNSTEADY
IMMEDIATE STANDING BALANCE FIRST 5 SECONDS: 1 - STEADY BUT USES WALKER OR OTHER SUPPORT
BALANCE SCORE: 6
NUDGED: 0 - BEGINS TO FALL
SITTING DOWN: 1 - USES ARMS OR NOT SMOOTH MOTION
ARISES: 1 - ABLE, USES ARMS TO HELP
STANDING BALANCE: 1 - STEADY BUT WIDE STANCE AND USES CANE OR OTHER SUPPORT
NUDGED SCORE: 0

## 2024-07-13 ASSESSMENT — ACTIVITIES OF DAILY LIVING (ADL)
PHYSICAL TRANSFERS ASSESSED: 1
AMBULATION ASSISTANCE: MAXIMUM ASSIST
AMBULATION ASSISTANCE: 1
PHYSICAL_TRANSFERS_DEVICES: BODY SUPPORT
AMBULATION ASSISTANCE ON FLAT SURFACES: 1
AMBULATION_DISTANCE/DURATION_TOLERATED: 60 FT
CURRENT_FUNCTION: MAXIMUM ASSIST

## 2024-07-13 NOTE — HOME HEALTH
GOALS: PATIENT WILL DEMONSTRATE IMPROVED ENDURANCE, MOBILITY, AND STRENGTH.          SUBJECTIVE: THE PATIENT REPORTS NO PAIN, BUT DIFFICULTY WITH AMBULATION.          LIVING CONDITION: PATIENT IS CURRENTLY LIVING IN A TWO-STORY BUILDING WITH STAIRS. PATIENT IS LIVING ALONE, ACCOMPANIED BY HER FAMILY MEMBERS INTEMITTENTLY.          OBJECTIVE: MANUAL MUSCLE TESTING WAS PERFORMED TO DETERMINE BOTH CORE AND LE STRENGTH. PATIENT DEMONSTRATED 3-/5 MUSCLE STRENGTH TO CORE AND CELIA LE'S. BALANCE TESTING WAS PERFORMED WHICH SHOWED PATIENT TO BE A HIGH FALL RISK. PATIENT WAS ABLE TO WALK 60 FEET WITH RW, BUT DEMONSTRATED DIFFICULTY WITH TRANSFERS FROM STS AND GAIT ABNORMALITIES ALONG WITH BALANCE IMPAIRMENT.          THERAPEUTIC ACTIVITIES: MMT, TINETTI, MOBILITY, AND GAIT ASSESSMENT COMPLETED          ASSESSMENT: DEMONSTRATED GROSSLY REDUCED STRENGTH AND ENDURANCE SECONDARY TO ASSOCIATED COMORBID CONDITIONS. PATIENT, PRESENTLY, IS A FALL RISK, CURRENTLY AMBULATING WITH A WALKER. FALL PRECAUTIONS DISCUSSED.          PLAN: CONSIDER COMORBID FACTORS WHEN IMPLEMENTING POC. CONTINUE WITH GENERAL ENDURANCE AND STRENGTH TRAINING UE'S AND LE'S, GAIT TRAINING ACTIVITIES, BALANCE AND PROPRIOCEPTIVE TRAINING, AND FALL PREVENTION STRATEGIES AS PER PT POC. PROGRESS AS TOLERATED.

## 2024-07-14 VITALS — SYSTOLIC BLOOD PRESSURE: 130 MMHG | HEART RATE: 60 BPM | RESPIRATION RATE: 18 BRPM | DIASTOLIC BLOOD PRESSURE: 60 MMHG

## 2024-07-15 ENCOUNTER — HOME CARE VISIT (OUTPATIENT)
Dept: HOME HEALTH SERVICES | Facility: HOME HEALTH | Age: 89
End: 2024-07-15
Payer: MEDICARE

## 2024-07-15 VITALS
SYSTOLIC BLOOD PRESSURE: 160 MMHG | RESPIRATION RATE: 16 BRPM | TEMPERATURE: 98.2 F | DIASTOLIC BLOOD PRESSURE: 60 MMHG | HEART RATE: 57 BPM

## 2024-07-15 PROCEDURE — G0158 HHC OT ASSISTANT EA 15: HCPCS | Mod: CO,HHH

## 2024-07-17 ENCOUNTER — HOME CARE VISIT (OUTPATIENT)
Dept: HOME HEALTH SERVICES | Facility: HOME HEALTH | Age: 89
End: 2024-07-17
Payer: MEDICARE

## 2024-07-17 ENCOUNTER — TELEPHONE (OUTPATIENT)
Dept: PRIMARY CARE | Facility: CLINIC | Age: 89
End: 2024-07-17
Payer: MEDICARE

## 2024-07-17 VITALS
TEMPERATURE: 98.3 F | HEART RATE: 55 BPM | DIASTOLIC BLOOD PRESSURE: 71 MMHG | SYSTOLIC BLOOD PRESSURE: 152 MMHG | RESPIRATION RATE: 16 BRPM

## 2024-07-17 VITALS — RESPIRATION RATE: 18 BRPM | HEART RATE: 78 BPM | TEMPERATURE: 97.6 F | OXYGEN SATURATION: 97 %

## 2024-07-17 PROCEDURE — G0151 HHCP-SERV OF PT,EA 15 MIN: HCPCS | Mod: HHH

## 2024-07-17 PROCEDURE — G0158 HHC OT ASSISTANT EA 15: HCPCS | Mod: CO,HHH

## 2024-07-17 SDOH — HEALTH STABILITY: PHYSICAL HEALTH: PHYSICAL EXERCISE: 10

## 2024-07-17 SDOH — HEALTH STABILITY: PHYSICAL HEALTH: EXERCISE ACTIVITY: FUNCTIONAL TASK INCORPORATED INTO BALANCE TRAINING

## 2024-07-17 SDOH — HEALTH STABILITY: PHYSICAL HEALTH: RESISTANCE: AS TOLERATED

## 2024-07-17 SDOH — HEALTH STABILITY: PHYSICAL HEALTH: EXERCISE ACTIVITIES SETS: 2

## 2024-07-17 SDOH — HEALTH STABILITY: PHYSICAL HEALTH

## 2024-07-17 SDOH — HEALTH STABILITY: PHYSICAL HEALTH: EXERCISE ACTIVITY: GAIT TRAINING

## 2024-07-17 SDOH — HEALTH STABILITY: PHYSICAL HEALTH: EXERCISE TYPE: SKILLED THERAPEUTIC EXERCISES

## 2024-07-17 SDOH — HEALTH STABILITY: PHYSICAL HEALTH: EXERCISE ACTIVITY: SKILLED THERAPEUTIC EXERCISES

## 2024-07-17 SDOH — HEALTH STABILITY: PHYSICAL HEALTH: PHYSICAL EXERCISE: SITTING, STANDING

## 2024-07-17 ASSESSMENT — ACTIVITIES OF DAILY LIVING (ADL)
AMBULATION ASSISTANCE ON FLAT SURFACES: 1
AMBULATION_DISTANCE/DURATION_TOLERATED: 10 FT

## 2024-07-17 ASSESSMENT — ENCOUNTER SYMPTOMS
DENIES PAIN: 1
PERSON REPORTING PAIN: PATIENT

## 2024-07-18 ENCOUNTER — HOME CARE VISIT (OUTPATIENT)
Dept: HOME HEALTH SERVICES | Facility: HOME HEALTH | Age: 89
End: 2024-07-18
Payer: MEDICARE

## 2024-07-18 VITALS
OXYGEN SATURATION: 98 % | DIASTOLIC BLOOD PRESSURE: 64 MMHG | TEMPERATURE: 97.3 F | SYSTOLIC BLOOD PRESSURE: 138 MMHG | HEART RATE: 62 BPM | RESPIRATION RATE: 18 BRPM

## 2024-07-18 PROCEDURE — G0300 HHS/HOSPICE OF LPN EA 15 MIN: HCPCS | Mod: HHH

## 2024-07-18 ASSESSMENT — ENCOUNTER SYMPTOMS
APPETITE LEVEL: GOOD
DENIES PAIN: 1
PERSON REPORTING PAIN: PATIENT
CHANGE IN APPETITE: UNCHANGED
LAST BOWEL MOVEMENT: 67039

## 2024-07-18 ASSESSMENT — PAIN SCALES - PAIN ASSESSMENT IN ADVANCED DEMENTIA (PAINAD)
BODYLANGUAGE: 0
BODYLANGUAGE: 0 - RELAXED.
FACIALEXPRESSION: 0
FACIALEXPRESSION: 0 - SMILING OR INEXPRESSIVE.
CONSOLABILITY: 0
TOTALSCORE: 0
CONSOLABILITY: 0 - NO NEED TO CONSOLE.
NEGVOCALIZATION: 0
BREATHING: 0
NEGVOCALIZATION: 0 - NONE.

## 2024-07-19 ENCOUNTER — HOME CARE VISIT (OUTPATIENT)
Dept: HOME HEALTH SERVICES | Facility: HOME HEALTH | Age: 89
End: 2024-07-19
Payer: MEDICARE

## 2024-07-19 VITALS — OXYGEN SATURATION: 99 % | RESPIRATION RATE: 18 BRPM | HEART RATE: 56 BPM | TEMPERATURE: 97.6 F

## 2024-07-19 PROCEDURE — G0151 HHCP-SERV OF PT,EA 15 MIN: HCPCS | Mod: HHH

## 2024-07-19 SDOH — HEALTH STABILITY: PHYSICAL HEALTH: EXERCISE ACTIVITY: BALANCE TRAINING INCORPORATED INTO FUNCTIONAL TASKS

## 2024-07-19 SDOH — HEALTH STABILITY: PHYSICAL HEALTH: EXERCISE ACTIVITY: SKILLED THERAPEUTIC EXERCISES

## 2024-07-19 SDOH — HEALTH STABILITY: PHYSICAL HEALTH: PHYSICAL EXERCISE: SITTING, STANDING

## 2024-07-19 SDOH — HEALTH STABILITY: PHYSICAL HEALTH: EXERCISE ACTIVITIES SETS: 2

## 2024-07-19 SDOH — HEALTH STABILITY: PHYSICAL HEALTH: PHYSICAL EXERCISE: 3

## 2024-07-19 SDOH — HEALTH STABILITY: PHYSICAL HEALTH

## 2024-07-19 SDOH — HEALTH STABILITY: PHYSICAL HEALTH: PHYSICAL EXERCISE: 10

## 2024-07-19 SDOH — HEALTH STABILITY: PHYSICAL HEALTH: EXERCISE ACTIVITY: GAIT TRAINING

## 2024-07-19 SDOH — HEALTH STABILITY: PHYSICAL HEALTH: EXERCISE TYPE: SKILLED THERAPEUTIC EXERCISES

## 2024-07-19 SDOH — HEALTH STABILITY: PHYSICAL HEALTH: RESISTANCE: AS TOLERATED

## 2024-07-19 ASSESSMENT — ENCOUNTER SYMPTOMS
PERSON REPORTING PAIN: PATIENT
DENIES PAIN: 1

## 2024-07-19 ASSESSMENT — ACTIVITIES OF DAILY LIVING (ADL)
AMBULATION_DISTANCE/DURATION_TOLERATED: 50 FT
AMBULATION ASSISTANCE ON FLAT SURFACES: 1

## 2024-07-19 NOTE — DOCUMENTATION CLARIFICATION NOTE
"    PATIENT:               ARMIDA VILLANUEVA  ACCT #:                  [id number]  MRN:                       [id number]  :                       10/2/1929  ADMIT DATE:       2024 3:02 PM  DISCH DATE:        2024 7:58 PM  RESPONDING PROVIDER #:        [id number]          PROVIDER RESPONSE TEXT:    Acute Hypoxemic Respiratory Failure    CDI QUERY TEXT:    Clarification        Instruction:    Based on your assessment of the patient and the clinical information, please provide the requested documentation by clicking on the appropriate radio button and enter any additional information if prompted.    Question: Is there a diagnosis indicative of the clinical information    When answering this query, please exercise your independent professional judgment. The fact that a question is being asked, does not imply that any particular answer is desired or expected.    The patient's clinical indicators include:  Clinical Information: Patient presented with SOB and Hypoxia  and ruled in for COVID infection    Clinical Indicators:  -ED Vitals: Tmep-100.5, HR-74, RR-20, BP-137/56, SpO2-93 percent on RA    -History and physical-review of systems and physical exam: Respiratory: Shortness of breath. Respiratory assessment: \"No respiratory distress, normal respiratory rhythm and effort. Auscultation of Lungs: Crackles\"    -SpO2 of 90 percent on 24 at 17:45 and placed on 2L of O2 via NC from 24-24    Treatment: O2    Risk Factors: COVID with respiratory symptoms  Options provided:  -- Acute Hypoxemic Respiratory Failure  -- No acute respiratory failure  -- Other - I will add my own diagnosis  -- Refer to Clinical Documentation Reviewer    Query created by: Elinor Hayes on 2024 4:52 PM      Electronically signed by:  MARCO ANTONIO WEAVER MD 2024 8:11 AM          "

## 2024-07-19 NOTE — DOCUMENTATION CLARIFICATION NOTE
"    PATIENT:               ARMIDA VILLANUEVA  ACCT #:                  [id number]  MRN:                       [id number]  :                       10/2/1929  ADMIT DATE:       2024 3:02 PM  DISCH DATE:        2024 7:58 PM  RESPONDING PROVIDER #:        [id number]          PROVIDER RESPONSE TEXT:    COVID pneumonia ruled out after workup    CDI QUERY TEXT:    Clarification        Instruction:    Based on your assessment of the patient and the clinical information, please provide the requested documentation by clicking on the appropriate radio button and enter any additional information if prompted.    Question: Please further clarify the diagnosis of COVID pneumonia as    When answering this query, please exercise your independent professional judgment. The fact that a question is being asked, does not imply that any particular answer is desired or expected.    The patient's clinical indicators include:  Clinical Information: Patient presented with SOB and Hypoxia  and ruled in for COVID infection    Clinical Indicators:    -24 CXR: \"Mild new left basilar atelectasis without evidence of consolidation or sizable pleural effusion\"    -History and physical assessment: \"COVID-pneumonia with hypoxia\"    -24 ID note-assessment: \"COVID-19 Infection with Hypoxia\"    Treatment: PO Decadron 24 to date, IV Remdesivir 5D-24 to date    Risk Factors: COVID  Options provided:  -- COVID pneumonia ruled out after workup  -- COVID pneumonia ruled in for this admission  -- Other - I will add my own diagnosis  -- Refer to Clinical Documentation Reviewer    Query created by: Elinor Hayes on 2024 4:44 PM      Electronically signed by:  MARCO ANTONIO WEAVER MD 2024 8:11 AM          "

## 2024-07-20 NOTE — HOME HEALTH
S: Patient seen for routine follow-up PT visit today. Patient reported no pain. Patient reported she has been performing the home exercises as planned twice daily.    O: Patient ambulated in the home using walker. Performed skilled therapeutic exercises 2 sets x 10 reps; gait training with walker x 60 FT; balance training.    A: Patient tolerated the treatment well today and was able to complete the exercise regimen without pain or difficulty. Patient demonstrated ability to perform safe STS transfer from chair with hand support and initiate gait with hestiancy. Patient can expect consistent increase in strength and mobility by continuing with current exercise regimen.    P: Consider comorbid conditions when implementing and progressing POC. Continue current exercise regimen including skilled LE therapeutic exercises to improve gross strength and endurance, 2 x 10 reps; gait training with walker/cane in the home environment; balance and proprioceptive training emphasizing focus towards narrow base of support and vesticular challenges.

## 2024-07-24 ENCOUNTER — HOME CARE VISIT (OUTPATIENT)
Dept: HOME HEALTH SERVICES | Facility: HOME HEALTH | Age: 89
End: 2024-07-24
Payer: MEDICARE

## 2024-07-24 VITALS — TEMPERATURE: 97.3 F | OXYGEN SATURATION: 99 % | RESPIRATION RATE: 18 BRPM | HEART RATE: 61 BPM

## 2024-07-24 PROCEDURE — G0151 HHCP-SERV OF PT,EA 15 MIN: HCPCS | Mod: HHH

## 2024-07-24 SDOH — HEALTH STABILITY: PHYSICAL HEALTH: RESISTANCE: AS TOLERATEDQ

## 2024-07-24 SDOH — HEALTH STABILITY: PHYSICAL HEALTH: EXERCISE ACTIVITY: STAIR TRAINING

## 2024-07-24 SDOH — HEALTH STABILITY: PHYSICAL HEALTH: EXERCISE ACTIVITY: GAIT TRAINING

## 2024-07-24 SDOH — HEALTH STABILITY: PHYSICAL HEALTH: PHYSICAL EXERCISE: SITTING, STANDING

## 2024-07-24 SDOH — HEALTH STABILITY: PHYSICAL HEALTH: EXERCISE ACTIVITY: SKILLED THERAPEUTIC EXERCISES

## 2024-07-24 SDOH — HEALTH STABILITY: PHYSICAL HEALTH: EXERCISE ACTIVITIES SETS: 2

## 2024-07-24 SDOH — HEALTH STABILITY: PHYSICAL HEALTH: EXERCISE ACTIVITY: BALANCE TRAINING

## 2024-07-24 SDOH — HEALTH STABILITY: PHYSICAL HEALTH: PHYSICAL EXERCISE: 10

## 2024-07-24 SDOH — HEALTH STABILITY: PHYSICAL HEALTH: EXERCISE TYPE: SKILLED THERAPEUTIC EXERCISES

## 2024-07-24 ASSESSMENT — ENCOUNTER SYMPTOMS
PERSON REPORTING PAIN: PATIENT
DENIES PAIN: 1

## 2024-07-24 ASSESSMENT — ACTIVITIES OF DAILY LIVING (ADL)
AMBULATION ASSISTANCE ON FLAT SURFACES: 1
AMBULATION_DISTANCE/DURATION_TOLERATED: 80 FT

## 2024-07-25 ENCOUNTER — HOME CARE VISIT (OUTPATIENT)
Dept: HOME HEALTH SERVICES | Facility: HOME HEALTH | Age: 89
End: 2024-07-25
Payer: MEDICARE

## 2024-07-25 ENCOUNTER — TELEPHONE (OUTPATIENT)
Dept: ENDOCRINOLOGY | Facility: CLINIC | Age: 89
End: 2024-07-25
Payer: MEDICARE

## 2024-07-25 VITALS
BODY MASS INDEX: 20.06 KG/M2 | SYSTOLIC BLOOD PRESSURE: 130 MMHG | RESPIRATION RATE: 16 BRPM | DIASTOLIC BLOOD PRESSURE: 70 MMHG | WEIGHT: 109.7 LBS | HEART RATE: 60 BPM | TEMPERATURE: 97.3 F | OXYGEN SATURATION: 99 %

## 2024-07-25 VITALS
DIASTOLIC BLOOD PRESSURE: 68 MMHG | HEART RATE: 64 BPM | OXYGEN SATURATION: 98 % | TEMPERATURE: 98.1 F | RESPIRATION RATE: 18 BRPM | SYSTOLIC BLOOD PRESSURE: 134 MMHG

## 2024-07-25 PROCEDURE — G0152 HHCP-SERV OF OT,EA 15 MIN: HCPCS | Mod: HHH

## 2024-07-25 PROCEDURE — G0299 HHS/HOSPICE OF RN EA 15 MIN: HCPCS | Mod: HHH

## 2024-07-25 ASSESSMENT — ENCOUNTER SYMPTOMS: DENIES PAIN: 1

## 2024-07-25 NOTE — TELEPHONE ENCOUNTER
Patient daughter called in with patients blood sugar readings     Pt is on tresiba 7U in pm     07/25/24 b-135  07/24/24 b-140 d-247  07/23/24 b-144 d-229  07/22/24 b-137 d-179  07/21/24 b-131 d-225  07/20/24 b-104 d-188  07/19/24 b-110 d-263  07/18/24 b-113 d-219

## 2024-07-26 ENCOUNTER — HOME CARE VISIT (OUTPATIENT)
Dept: HOME HEALTH SERVICES | Facility: HOME HEALTH | Age: 89
End: 2024-07-26
Payer: MEDICARE

## 2024-07-26 VITALS
HEART RATE: 54 BPM | DIASTOLIC BLOOD PRESSURE: 62 MMHG | OXYGEN SATURATION: 98 % | SYSTOLIC BLOOD PRESSURE: 150 MMHG | RESPIRATION RATE: 16 BRPM

## 2024-07-26 PROCEDURE — G0157 HHC PT ASSISTANT EA 15: HCPCS | Mod: CQ,HHH

## 2024-07-26 SDOH — HEALTH STABILITY: PHYSICAL HEALTH
EXERCISE COMMENTS: PATIENT PROGRESSED TO ALL STANDING THERAPEUTIC EXERCISES WITH NOA DVERSE RESPONSE. PATIENT REQUIRED VERBAL CUES AND VISUAL DEMONSTRATION FOR PROPER TECHNIQUE. PATIENT WAS PROVIDED WITH UPDATED WRITTEN HEP.

## 2024-07-26 ASSESSMENT — ACTIVITIES OF DAILY LIVING (ADL): AMBULATION ASSISTANCE ON FLAT SURFACES: 1

## 2024-07-26 ASSESSMENT — ENCOUNTER SYMPTOMS
APPETITE LEVEL: GOOD
DENIES PAIN: 1

## 2024-07-27 ENCOUNTER — HOME CARE VISIT (OUTPATIENT)
Dept: HOME HEALTH SERVICES | Facility: HOME HEALTH | Age: 89
End: 2024-07-27
Payer: MEDICARE

## 2024-07-27 VITALS
DIASTOLIC BLOOD PRESSURE: 78 MMHG | SYSTOLIC BLOOD PRESSURE: 126 MMHG | RESPIRATION RATE: 16 BRPM | HEART RATE: 62 BPM | TEMPERATURE: 98.6 F | OXYGEN SATURATION: 98 %

## 2024-07-27 PROCEDURE — G0152 HHCP-SERV OF OT,EA 15 MIN: HCPCS | Mod: HHH

## 2024-07-27 ASSESSMENT — ENCOUNTER SYMPTOMS
PAIN SEVERITY GOAL: 0/10
LOWEST PAIN SEVERITY IN PAST 24 HOURS: 0/10
PAIN SEVERITY GOAL: 0/10
DENIES PAIN: 1
HIGHEST PAIN SEVERITY IN PAST 24 HOURS: 0/10
DENIES PAIN: 1
LOWEST PAIN SEVERITY IN PAST 24 HOURS: 0/10
PERSON REPORTING PAIN: PATIENT
HIGHEST PAIN SEVERITY IN PAST 24 HOURS: 0/10
SUBJECTIVE PAIN PROGRESSION: UNCHANGED
PERSON REPORTING PAIN: PATIENT
SUBJECTIVE PAIN PROGRESSION: WAXING AND WANING

## 2024-07-27 ASSESSMENT — PAIN SCALES - PAIN ASSESSMENT IN ADVANCED DEMENTIA (PAINAD)
CONSOLABILITY: 0 - NO NEED TO CONSOLE.
CONSOLABILITY: 0
BODYLANGUAGE: 0 - RELAXED.
NEGVOCALIZATION: 0 - NONE.
FACIALEXPRESSION: 0
BREATHING: 0
NEGVOCALIZATION: 0
CONSOLABILITY: 0
TOTALSCORE: 0
BODYLANGUAGE: 0 - RELAXED.
BREATHING: 0
NEGVOCALIZATION: 0
FACIALEXPRESSION: 0 - SMILING OR INEXPRESSIVE.
FACIALEXPRESSION: 0 - SMILING OR INEXPRESSIVE.
BODYLANGUAGE: 0
TOTALSCORE: 0
NEGVOCALIZATION: 0 - NONE.
CONSOLABILITY: 0 - NO NEED TO CONSOLE.
FACIALEXPRESSION: 0
BODYLANGUAGE: 0

## 2024-07-27 ASSESSMENT — ACTIVITIES OF DAILY LIVING (ADL)
DRESSING_LB_CURRENT_FUNCTION: SUPERVISION
PHYSICAL TRANSFERS ASSESSED: 1
CURRENT_FUNCTION: SUPERVISION
GROOMING ASSESSED: 1
LIGHT HOUSEKEEPING: NEEDS ASSISTANCE
GROOMING_CURRENT_FUNCTION: SUPERVISION
CURRENT_FUNCTION: STAND BY ASSIST
GROOMING ASSESSED: 1
PHYSICAL TRANSFERS ASSESSED: 1
GROOMING_CURRENT_FUNCTION: STAND BY ASSIST
HOUSEKEEPING ASSESSED: 1

## 2024-07-29 ENCOUNTER — HOME CARE VISIT (OUTPATIENT)
Dept: HOME HEALTH SERVICES | Facility: HOME HEALTH | Age: 89
End: 2024-07-29
Payer: MEDICARE

## 2024-07-29 VITALS
DIASTOLIC BLOOD PRESSURE: 59 MMHG | SYSTOLIC BLOOD PRESSURE: 124 MMHG | HEART RATE: 61 BPM | RESPIRATION RATE: 16 BRPM | TEMPERATURE: 98.2 F

## 2024-07-29 PROCEDURE — G0158 HHC OT ASSISTANT EA 15: HCPCS | Mod: CO,HHH

## 2024-07-30 ENCOUNTER — HOME CARE VISIT (OUTPATIENT)
Dept: HOME HEALTH SERVICES | Facility: HOME HEALTH | Age: 89
End: 2024-07-30
Payer: MEDICARE

## 2024-07-30 VITALS
RESPIRATION RATE: 16 BRPM | DIASTOLIC BLOOD PRESSURE: 74 MMHG | SYSTOLIC BLOOD PRESSURE: 156 MMHG | HEART RATE: 64 BPM | TEMPERATURE: 98.3 F

## 2024-07-30 PROCEDURE — G0158 HHC OT ASSISTANT EA 15: HCPCS | Mod: CO,HHH

## 2024-07-31 ENCOUNTER — HOME CARE VISIT (OUTPATIENT)
Dept: HOME HEALTH SERVICES | Facility: HOME HEALTH | Age: 89
End: 2024-07-31
Payer: MEDICARE

## 2024-07-31 VITALS
DIASTOLIC BLOOD PRESSURE: 71 MMHG | SYSTOLIC BLOOD PRESSURE: 150 MMHG | TEMPERATURE: 97.7 F | OXYGEN SATURATION: 99 % | HEART RATE: 63 BPM | RESPIRATION RATE: 18 BRPM

## 2024-07-31 PROCEDURE — G0151 HHCP-SERV OF PT,EA 15 MIN: HCPCS | Mod: HHH

## 2024-07-31 SDOH — HEALTH STABILITY: PHYSICAL HEALTH: PHYSICAL EXERCISE: SITTING, STANDING

## 2024-07-31 SDOH — HEALTH STABILITY: PHYSICAL HEALTH: RESISTANCE: AS TOLERATED

## 2024-07-31 SDOH — HEALTH STABILITY: PHYSICAL HEALTH: EXERCISE TYPE: SKILLED THERAPEUTIC EXERCISES

## 2024-07-31 SDOH — HEALTH STABILITY: PHYSICAL HEALTH: EXERCISE ACTIVITY: SKILLED THERAPEUTIC EXERCISES

## 2024-07-31 SDOH — HEALTH STABILITY: PHYSICAL HEALTH: EXERCISE ACTIVITY: BALANCE TRAINING

## 2024-07-31 SDOH — HEALTH STABILITY: PHYSICAL HEALTH: EXERCISE ACTIVITY: GAIT AND STAIR TRAINING

## 2024-07-31 SDOH — HEALTH STABILITY: PHYSICAL HEALTH: PHYSICAL EXERCISE: 10

## 2024-07-31 SDOH — HEALTH STABILITY: PHYSICAL HEALTH: EXERCISE ACTIVITIES SETS: 2

## 2024-07-31 ASSESSMENT — ACTIVITIES OF DAILY LIVING (ADL)
AMBULATION ASSISTANCE ON FLAT SURFACES: 1
AMBULATION_DISTANCE/DURATION_TOLERATED: 70 FT

## 2024-08-02 ENCOUNTER — HOME CARE VISIT (OUTPATIENT)
Dept: HOME HEALTH SERVICES | Facility: HOME HEALTH | Age: 89
End: 2024-08-02
Payer: MEDICARE

## 2024-08-02 VITALS
WEIGHT: 109.4 LBS | HEART RATE: 60 BPM | OXYGEN SATURATION: 98 % | BODY MASS INDEX: 20.01 KG/M2 | TEMPERATURE: 97.4 F | SYSTOLIC BLOOD PRESSURE: 125 MMHG | DIASTOLIC BLOOD PRESSURE: 70 MMHG | RESPIRATION RATE: 16 BRPM

## 2024-08-02 PROCEDURE — G0151 HHCP-SERV OF PT,EA 15 MIN: HCPCS | Mod: HHH

## 2024-08-02 PROCEDURE — G0299 HHS/HOSPICE OF RN EA 15 MIN: HCPCS | Mod: HHH

## 2024-08-02 ASSESSMENT — ENCOUNTER SYMPTOMS: DENIES PAIN: 1

## 2024-08-04 VITALS — OXYGEN SATURATION: 98 % | HEART RATE: 60 BPM | TEMPERATURE: 97.4 F | RESPIRATION RATE: 18 BRPM

## 2024-08-04 SDOH — HEALTH STABILITY: PHYSICAL HEALTH: EXERCISE TYPE: HEP

## 2024-08-04 SDOH — HEALTH STABILITY: PHYSICAL HEALTH: PHYSICAL EXERCISE: SITTING, STANDING

## 2024-08-04 SDOH — HEALTH STABILITY: PHYSICAL HEALTH: EXERCISE ACTIVITY: HEP

## 2024-08-04 SDOH — HEALTH STABILITY: PHYSICAL HEALTH: RESISTANCE: AS TOLERATED

## 2024-08-04 SDOH — HEALTH STABILITY: PHYSICAL HEALTH: EXERCISE ACTIVITIES SETS: 2

## 2024-08-04 SDOH — HEALTH STABILITY: PHYSICAL HEALTH: PHYSICAL EXERCISE: 10

## 2024-08-04 SDOH — HEALTH STABILITY: PHYSICAL HEALTH: EXERCISE ACTIVITY: GAIT TRAINING

## 2024-08-04 SDOH — HEALTH STABILITY: PHYSICAL HEALTH: EXERCISE ACTIVITY: BALANCE TRAINING

## 2024-08-04 SDOH — HEALTH STABILITY: PHYSICAL HEALTH: EXERCISE ACTIVITY: STAIR TRAINING

## 2024-08-04 SDOH — HEALTH STABILITY: PHYSICAL HEALTH: PHYSICAL EXERCISE: 15

## 2024-08-04 ASSESSMENT — ENCOUNTER SYMPTOMS
PERSON REPORTING PAIN: PATIENT
DENIES PAIN: 1

## 2024-08-04 ASSESSMENT — ACTIVITIES OF DAILY LIVING (ADL)
HOME_HEALTH_OASIS: 01
AMBULATION_DISTANCE/DURATION_TOLERATED: 70 FT
AMBULATION ASSISTANCE ON FLAT SURFACES: 1
OASIS_M1830: 03

## 2024-08-07 ENCOUNTER — HOME CARE VISIT (OUTPATIENT)
Dept: HOME HEALTH SERVICES | Facility: HOME HEALTH | Age: 89
End: 2024-08-07
Payer: MEDICARE

## 2024-08-07 PROCEDURE — G0151 HHCP-SERV OF PT,EA 15 MIN: HCPCS | Mod: HHH

## 2024-08-07 NOTE — Clinical Note
Patient seen for PT reassessment visit today. Patient demonstrated improvement in MMT and balance testing score. Plan to progress with 2 x 2 weeks of therapy focusing on improving patient's strength, balance, and stair training.

## 2024-08-08 ENCOUNTER — HOME CARE VISIT (OUTPATIENT)
Dept: HOME HEALTH SERVICES | Facility: HOME HEALTH | Age: 89
End: 2024-08-08
Payer: MEDICARE

## 2024-08-08 VITALS
DIASTOLIC BLOOD PRESSURE: 70 MMHG | HEART RATE: 56 BPM | RESPIRATION RATE: 16 BRPM | TEMPERATURE: 98.6 F | SYSTOLIC BLOOD PRESSURE: 141 MMHG

## 2024-08-08 VITALS — OXYGEN SATURATION: 97 % | RESPIRATION RATE: 18 BRPM | TEMPERATURE: 97.7 F | HEART RATE: 54 BPM

## 2024-08-08 PROCEDURE — G0158 HHC OT ASSISTANT EA 15: HCPCS | Mod: CO,HHH

## 2024-08-08 SDOH — HEALTH STABILITY: PHYSICAL HEALTH: EXERCISE ACTIVITIES SETS: 2

## 2024-08-08 SDOH — HEALTH STABILITY: PHYSICAL HEALTH: RESISTANCE: AS TOLERATED

## 2024-08-08 SDOH — HEALTH STABILITY: PHYSICAL HEALTH: PHYSICAL EXERCISE: 10

## 2024-08-08 SDOH — HEALTH STABILITY: PHYSICAL HEALTH: EXERCISE TYPE: SKILLED THERAPEUTIC EXERCISES

## 2024-08-08 SDOH — HEALTH STABILITY: PHYSICAL HEALTH: PHYSICAL EXERCISE: SITTING, STANDING

## 2024-08-08 SDOH — HEALTH STABILITY: PHYSICAL HEALTH: EXERCISE ACTIVITY: GAIT TRAINING

## 2024-08-08 SDOH — HEALTH STABILITY: PHYSICAL HEALTH: EXERCISE ACTIVITY: SKILLED THERAPEUTIC EXERCISES

## 2024-08-08 SDOH — HEALTH STABILITY: PHYSICAL HEALTH: EXERCISE ACTIVITY: STAIR TRAINING

## 2024-08-08 ASSESSMENT — BALANCE ASSESSMENTS
IMMEDIATE STANDING BALANCE FIRST 5 SECONDS: 1 - STEADY BUT USES WALKER OR OTHER SUPPORT
ARISES: 1 - ABLE, USES ARMS TO HELP
ATTEMPTS TO ARISE: 2 - ABLE TO RISE, ONE ATTEMPT
BALANCE SCORE: 10
TURNING 360 DEGREES STEPS: 0 - DISCONTINUOUS STEPS
ARISING SCORE: 1
NUDGED: 0 - BEGINS TO FALL
SITTING DOWN: 2 - SAFE, SMOOTH MOTION
NUDGED SCORE: 0
SITTING BALANCE: 1 - STEADY, SAFE
STANDING BALANCE: 2 - NARROW STANCE WITHOUT SUPPORT
EYES CLOSED AT MAXIMUM POSITION NUDGED: 1 - STEADY

## 2024-08-08 ASSESSMENT — GAIT ASSESSMENTS
STEP SYMMETRY: 1 - RIGHT AND LEFT STEP LENGTH APPEAR EQUAL
TRUNK: 1 - NO SWAY BUT FLEXION OF KNEES OR BACK OR SPREADS ARMS WHILE WALKING
STEP CONTINUITY: 1 - STEPS APPEAR CONTINUOUS
BALANCE AND GAIT SCORE: 20
INITIATION OF GAIT IMMEDIATELY AFTER GO: 1 - NO HESITANCY
GAIT SCORE: 10
WALKING STANCE: 1 - HEELS ALMOST TOUCHING WHILE WALKING
PATH: 1 - MILD/MODERATE DEVIATION OR USES WALKING AID
PATH SCORE: 1
TRUNK SCORE: 1

## 2024-08-08 ASSESSMENT — ACTIVITIES OF DAILY LIVING (ADL)
CURRENT_FUNCTION: MAXIMUM ASSIST
PHYSICAL_TRANSFERS_DEVICES: BODY SUPPORT
PHYSICAL TRANSFERS ASSESSED: 1
AMBULATION ASSISTANCE ON FLAT SURFACES: 1
AMBULATION_DISTANCE/DURATION_TOLERATED: 50 FT
AMBULATION ASSISTANCE: 1
AMBULATION ASSISTANCE: MODERATE ASSIST

## 2024-08-08 ASSESSMENT — ENCOUNTER SYMPTOMS
ARTHRALGIAS: 1
PERSON REPORTING PAIN: PATIENT
LIMITED RANGE OF MOTION: 1
DENIES PAIN: 1
MUSCLE WEAKNESS: 1

## 2024-08-08 NOTE — DISCHARGE SUMMARY
"Discharge Diagnosis  COVID    Issues Requiring Follow-Up  fatigue    Discharge Meds     Your medication list        START taking these medications        Instructions Last Dose Given Next Dose Due   dexAMETHasone 6 mg tablet  Commonly known as: Decadron      Take 1 tablet (6 mg) by mouth once daily for 3 days.       ondansetron 4 mg tablet  Commonly known as: Zofran      Take 1 tablet (4 mg) by mouth every 8 hours if needed for nausea.       thiamine 100 mg tablet  Commonly known as: Vitamin B-1      Take 1 tablet (100 mg) by mouth once daily.              CHANGE how you take these medications        Instructions Last Dose Given Next Dose Due   metoprolol succinate XL 25 mg 24 hr tablet  Commonly known as: Toprol-XL  What changed:   how much to take  how to take this  when to take this      1/2 tablet by mouth daily              CONTINUE taking these medications        Instructions Last Dose Given Next Dose Due   amiodarone 100 mg tablet  Commonly known as: Pacerone      Take 1 tablet (100 mg) by mouth once daily.       amLODIPine 2.5 mg tablet  Commonly known as: Norvasc      Take 1 tablet (2.5 mg) by mouth once daily.       apixaban 2.5 mg tablet  Commonly known as: Eliquis      Take 1 tablet (2.5 mg) by mouth 2 times a day.       atorvastatin 80 mg tablet  Commonly known as: Lipitor      Take 1 tablet (80 mg) by mouth once daily at bedtime.       BD Skye 2nd Gen Pen Needle 32 gauge x 5/32\" needle  Generic drug: pen needle, diabetic           blood pressure test kit-wrist kit      1 kit once daily.       clopidogrel 75 mg tablet  Commonly known as: Plavix      Take 1 tablet (75 mg) by mouth once daily.       Colace 100 mg capsule  Generic drug: docusate sodium           cyanocobalamin 500 mcg tablet  Commonly known as: Vitamin B-12           ferrous sulfate 325 (65 Fe) MG EC tablet           furosemide 20 mg tablet  Commonly known as: Lasix      Take 1 tablet (20 mg) by mouth 4 times a week. Take on Monday, " Wednesday, and Friday       isosorbide mononitrate ER 60 mg 24 hr tablet  Commonly known as: Imdur      Take 1 tablet (60 mg) by mouth once daily.       lancets misc  Commonly known as: Accu-Chek Fastclix Lancet Drum      1 Lancet 3 times a day.       losartan 100 mg tablet  Commonly known as: Cozaar      Take 0.5 tablets (50 mg) by mouth 2 times a day. Hold if the top number for blood pressure is less then 130       nitroglycerin 0.4 mg SL tablet  Commonly known as: Nitrostat      Place 1 tablet (0.4 mg) under the tongue every 5 minutes if needed for chest pain.       OneTouch Ultra Test strip  Generic drug: blood sugar diagnostic           Tresiba FlexTouch U-100 100 unit/mL (3 mL) injection  Generic drug: insulin degludec                     Where to Get Your Medications        These medications were sent to [facility]/pharmacy #[id number] - [place] [id number] [facility]  AT [facility]  [id number] [address] [facility] [address]      Phone: [phone]   dexAMETHasone 6 mg tablet  ondansetron 4 mg tablet  thiamine 100 mg tablet         Test Results Pending At Discharge  Pending Labs       No current pending labs.            Hospital Course   Pt is 89 yr old with h/o cad admitted to hospital for covid,   And treated with improvement   Her oxygenation improved  And she does have fatigue which is related to covid and will cont with supportive care  She was discharged to home     Pertinent Physical Exam At Time of Discharge  Physical Exam    Outpatient Follow-Up  Future Appointments   Date Time Provider Department Center   8/8/2024  2:30 PM GIDEON Charles [facility]   8/9/2024 11:00 AM Cheyanne Hart, OT [facility]   8/9/2024  1:00 PM Sherron Wheeler, PT [facility]   8/14/2024 To Be Determined Sherron Wheeler, PT [facility]   8/15/2024  4:00 PM Martha Domingo MD [id number]   8/16/2024 To Be Determined Sherron Wheeler, PT [facility]   8/20/2024  2:00 PM Bernice Maciel, APRN-CNP [id number]   10/9/2024  2:30 PM Mohsen LOPEZ  MD [place] [id number]         Elieser Marks MD

## 2024-08-08 NOTE — HOME HEALTH
S: Patient seen for PT reassessment visit today. Patient reported no pain, but soreness to her right hip region. Patient reported she has been performing the home exercises as planned twice daily.    O: Patient ambulated in the home using cane. Performed skilled therapeutic exercises 2 sets x 10 reps; gait training with cane x 50 FT; balance training.    A: Patient tolerated the treatment well today and was able to complete the exercise regimen without pain,but with mild to moderate transfer and gait difficulty. Patient demonstrated ability to perform safe STS transfer from chair with hand support and initiate gait with hestiancy. Patient can expect consistent increase in strength and mobility by continuing with current exercise regimen.    P: Consider comorbid conditions when implementing and progressing POC. Continue current exercise regimen including skilled LE therapeutic exercises to improve gross strength and endurance, 2 x 10 reps; gait training with walker/cane in the home environment; balance and proprioceptive training emphasizing focus towards narrow base of support and vesticular challenges.    Patient demonstrated improvement in MMT grossly 1 grade in kayla LE's and balance testing score from 12 to 20. Plan to progress with 2 x 2 weeks of therapy focusing on improving patient's strength, balance, and stair training.

## 2024-08-09 ENCOUNTER — HOME CARE VISIT (OUTPATIENT)
Dept: HOME HEALTH SERVICES | Facility: HOME HEALTH | Age: 89
End: 2024-08-09
Payer: MEDICARE

## 2024-08-09 VITALS
HEART RATE: 72 BPM | OXYGEN SATURATION: 98 % | DIASTOLIC BLOOD PRESSURE: 72 MMHG | SYSTOLIC BLOOD PRESSURE: 130 MMHG | TEMPERATURE: 98.4 F

## 2024-08-09 VITALS — TEMPERATURE: 97.3 F | RESPIRATION RATE: 18 BRPM | HEART RATE: 66 BPM | OXYGEN SATURATION: 99 %

## 2024-08-09 PROCEDURE — G0151 HHCP-SERV OF PT,EA 15 MIN: HCPCS | Mod: HHH

## 2024-08-09 PROCEDURE — G0152 HHCP-SERV OF OT,EA 15 MIN: HCPCS | Mod: HHH

## 2024-08-09 SDOH — HEALTH STABILITY: PHYSICAL HEALTH

## 2024-08-09 SDOH — HEALTH STABILITY: PHYSICAL HEALTH: EXERCISE ACTIVITY: STS

## 2024-08-09 SDOH — HEALTH STABILITY: PHYSICAL HEALTH: PHYSICAL EXERCISE: 10

## 2024-08-09 SDOH — HEALTH STABILITY: PHYSICAL HEALTH: EXERCISE ACTIVITY: GAIT TRAINING

## 2024-08-09 SDOH — HEALTH STABILITY: PHYSICAL HEALTH: EXERCISE ACTIVITIES SETS: 1

## 2024-08-09 SDOH — HEALTH STABILITY: PHYSICAL HEALTH: PHYSICAL EXERCISE: SITTING, STANDING

## 2024-08-09 SDOH — HEALTH STABILITY: PHYSICAL HEALTH: RESISTANCE: AS TOLERATED

## 2024-08-09 SDOH — HEALTH STABILITY: PHYSICAL HEALTH: EXERCISE ACTIVITY: SKILLED THERAPEUTIC EXERCISES

## 2024-08-09 SDOH — HEALTH STABILITY: PHYSICAL HEALTH: EXERCISE TYPE: SKILLED THERAPEUTIC EXERCISES

## 2024-08-09 SDOH — HEALTH STABILITY: PHYSICAL HEALTH: EXERCISE ACTIVITIES SETS: 2

## 2024-08-09 ASSESSMENT — ACTIVITIES OF DAILY LIVING (ADL)
BATHING_CURRENT_FUNCTION: MINIMUM ASSIST
BATHING_CURRENT_FUNCTION: CONTACT GUARD ASSIST
AMBULATION ASSISTANCE ON FLAT SURFACES: 1
BATHING ASSESSED: 1
AMBULATION_DISTANCE/DURATION_TOLERATED: 60 FT

## 2024-08-09 ASSESSMENT — BALANCE ASSESSMENTS
IMMEDIATE STANDING BALANCE FIRST 5 SECONDS: 1 - STEADY BUT USES WALKER OR OTHER SUPPORT
ARISING SCORE: 2
TURNING 360 DEGREES STEPS: 1 - CONTINUOUS STEPS
SITTING DOWN: 1 - USES ARMS OR NOT SMOOTH MOTION
SITTING BALANCE: 1 - STEADY, SAFE
NUDGED: 0 - BEGINS TO FALL
NUDGED SCORE: 0
BALANCE SCORE: 10
ARISES: 2 - ABLE WITHOUT USING ARMS
STANDING BALANCE: 1 - STEADY BUT WIDE STANCE AND USES CANE OR OTHER SUPPORT
ATTEMPTS TO ARISE: 2 - ABLE TO RISE, ONE ATTEMPT
EYES CLOSED AT MAXIMUM POSITION NUDGED: 0 - UNSTEADY

## 2024-08-09 ASSESSMENT — ENCOUNTER SYMPTOMS
PAIN: 1
PAIN LOCATION - PAIN SEVERITY: 0/10
DENIES PAIN: 1
LOWEST PAIN SEVERITY IN PAST 24 HOURS: 0/10
PERSON REPORTING PAIN: PATIENT
PERSON REPORTING PAIN: PATIENT
PAIN LOCATION: RIGHT LEG
HIGHEST PAIN SEVERITY IN PAST 24 HOURS: 0/10

## 2024-08-09 ASSESSMENT — GAIT ASSESSMENTS
TRUNK SCORE: 1
GAIT SCORE: 10
INITIATION OF GAIT IMMEDIATELY AFTER GO: 1 - NO HESITANCY
BALANCE AND GAIT SCORE: 20
PATH: 1 - MILD/MODERATE DEVIATION OR USES WALKING AID
STEP CONTINUITY: 1 - STEPS APPEAR CONTINUOUS
WALKING STANCE: 1 - HEELS ALMOST TOUCHING WHILE WALKING
STEP SYMMETRY: 1 - RIGHT AND LEFT STEP LENGTH APPEAR EQUAL
PATH SCORE: 1
TRUNK: 1 - NO SWAY BUT FLEXION OF KNEES OR BACK OR SPREADS ARMS WHILE WALKING

## 2024-08-14 ENCOUNTER — HOME CARE VISIT (OUTPATIENT)
Dept: HOME HEALTH SERVICES | Facility: HOME HEALTH | Age: 89
End: 2024-08-14
Payer: MEDICARE

## 2024-08-14 PROCEDURE — G0151 HHCP-SERV OF PT,EA 15 MIN: HCPCS | Mod: HHH

## 2024-08-15 ENCOUNTER — APPOINTMENT (OUTPATIENT)
Dept: ENDOCRINOLOGY | Facility: CLINIC | Age: 89
End: 2024-08-15
Payer: MEDICARE

## 2024-08-15 VITALS — TEMPERATURE: 97.4 F | OXYGEN SATURATION: 100 % | HEART RATE: 66 BPM | RESPIRATION RATE: 18 BRPM

## 2024-08-15 VITALS
SYSTOLIC BLOOD PRESSURE: 124 MMHG | WEIGHT: 109.8 LBS | HEART RATE: 76 BPM | RESPIRATION RATE: 16 BRPM | HEIGHT: 62 IN | DIASTOLIC BLOOD PRESSURE: 70 MMHG | BODY MASS INDEX: 20.2 KG/M2

## 2024-08-15 DIAGNOSIS — E11.9 TYPE 2 DIABETES MELLITUS WITHOUT COMPLICATION, WITH LONG-TERM CURRENT USE OF INSULIN (MULTI): Primary | ICD-10-CM

## 2024-08-15 DIAGNOSIS — E03.8 ADULT ONSET HYPOTHYROIDISM: ICD-10-CM

## 2024-08-15 DIAGNOSIS — Z79.4 TYPE 2 DIABETES MELLITUS WITHOUT COMPLICATION, WITH LONG-TERM CURRENT USE OF INSULIN (MULTI): Primary | ICD-10-CM

## 2024-08-15 DIAGNOSIS — E78.00 PURE HYPERCHOLESTEROLEMIA: ICD-10-CM

## 2024-08-15 DIAGNOSIS — I10 PRIMARY HYPERTENSION: ICD-10-CM

## 2024-08-15 PROCEDURE — 1036F TOBACCO NON-USER: CPT | Performed by: INTERNAL MEDICINE

## 2024-08-15 PROCEDURE — 3074F SYST BP LT 130 MM HG: CPT | Performed by: INTERNAL MEDICINE

## 2024-08-15 PROCEDURE — 1159F MED LIST DOCD IN RCRD: CPT | Performed by: INTERNAL MEDICINE

## 2024-08-15 PROCEDURE — 3078F DIAST BP <80 MM HG: CPT | Performed by: INTERNAL MEDICINE

## 2024-08-15 PROCEDURE — 99214 OFFICE O/P EST MOD 30 MIN: CPT | Performed by: INTERNAL MEDICINE

## 2024-08-15 PROCEDURE — 1157F ADVNC CARE PLAN IN RCRD: CPT | Performed by: INTERNAL MEDICINE

## 2024-08-15 PROCEDURE — 1160F RVW MEDS BY RX/DR IN RCRD: CPT | Performed by: INTERNAL MEDICINE

## 2024-08-15 SDOH — HEALTH STABILITY: PHYSICAL HEALTH: EXERCISE ACTIVITY: STAIR TRAINING

## 2024-08-15 SDOH — HEALTH STABILITY: PHYSICAL HEALTH

## 2024-08-15 SDOH — HEALTH STABILITY: PHYSICAL HEALTH: PHYSICAL EXERCISE: 10

## 2024-08-15 SDOH — HEALTH STABILITY: PHYSICAL HEALTH: EXERCISE ACTIVITY: GAIT TRAINING

## 2024-08-15 SDOH — HEALTH STABILITY: PHYSICAL HEALTH: PHYSICAL EXERCISE: 30

## 2024-08-15 SDOH — HEALTH STABILITY: PHYSICAL HEALTH: PHYSICAL EXERCISE: SITTING, STANDING

## 2024-08-15 SDOH — HEALTH STABILITY: PHYSICAL HEALTH: EXERCISE ACTIVITIES SETS: 2

## 2024-08-15 SDOH — HEALTH STABILITY: PHYSICAL HEALTH: RESISTANCE: AS TOLERATED

## 2024-08-15 SDOH — HEALTH STABILITY: PHYSICAL HEALTH: EXERCISE ACTIVITY: SKILLED THERAPEUTIC EXERCISES

## 2024-08-15 SDOH — HEALTH STABILITY: PHYSICAL HEALTH: EXERCISE TYPE: SKILLED THERAPEUTIC EXERCISES

## 2024-08-15 ASSESSMENT — ENCOUNTER SYMPTOMS
SHORTNESS OF BREATH: 0
VOMITING: 0
HEADACHES: 0
DIARRHEA: 0
FATIGUE: 0
NAUSEA: 0
FEVER: 0
CHILLS: 0
PALPITATIONS: 0
COUGH: 0
DENIES PAIN: 1
PERSON REPORTING PAIN: PATIENT

## 2024-08-15 ASSESSMENT — ACTIVITIES OF DAILY LIVING (ADL)
AMBULATION_DISTANCE/DURATION_TOLERATED: 70 FT
AMBULATION ASSISTANCE ON FLAT SURFACES: 1

## 2024-08-15 NOTE — PROGRESS NOTES
Endocrinology: Follow up visit  Subjective   Patient ID: Shona Montgomery is a 89 y.o. female who presents for Diabetes (Type 2 ), Hyperlipidemia, and Hypertension.    PCP: Elieser Marks MD    HPI  Since last visit hospitalized with covid/pna.  Sugars were way up on dexamethasone rx for her breathing but now much improved.   Still feels not quite up to her usual energy.  Family is very attentive and has been with her 24/7  Tresiba at 7 units  Hx elevated lipase, cri so meds are limited    Review of Systems   Constitutional:  Negative for chills, fatigue and fever.   Respiratory:  Negative for cough and shortness of breath.    Cardiovascular:  Negative for chest pain and palpitations.   Gastrointestinal:  Negative for diarrhea, nausea and vomiting.   Neurological:  Negative for headaches.       Patient Active Problem List   Diagnosis    NSTEMI (non-ST elevated myocardial infarction) (Multi)    Chronic atrial fibrillation (Multi)    Abnormal glucose    Acute renal failure (CMS-HCC)    Atherosclerotic heart disease of native coronary artery without angina pectoris    Renal/ureteral disease    Choledocholithiasis    Diabetes mellitus (Multi)    Dizziness    Edema    Hemangioma of skin and subcutaneous tissue    Hematuria    Hematuria, microscopic    Hyperkalemia    Hyperlipidemia    Hypertension    Lower extremity edema    Actinic keratosis    Other seborrheic keratosis    Skin changes due to chronic exposure to nonionizing radiation, unspecified    Personal history of other malignant neoplasm of skin    Polyuria    Primary osteoarthritis of first carpometacarpal joint of right hand    Psoriasis    Shortness of breath    Slac (scapholunate advanced collapse) of wrist, right    Tinea    Weakness    Adult onset hypothyroidism    Chest pain    Influenza A    Acute on chronic HFrEF (heart failure with reduced ejection fraction) (Multi)    Acute on chronic congestive heart failure, unspecified heart failure type (Multi)     "Abdominal gas pain    Anemia    Calculus of kidney    Cellulitis of toe    Difficulty walking    Dislocation closed, shoulder    Disorder of rotator cuff    Essential hypertension, benign    Hammer toe of right foot    Pain in both feet    Longstanding persistent atrial fibrillation (Multi)    Nausea and vomiting    Onychomycosis    Polyneuropathy due to type 2 diabetes mellitus (Multi)    Stage 3 chronic kidney disease (Multi)    Status post hip replacement    COVID        Home Meds:  Current Outpatient Medications   Medication Instructions    amiodarone (PACERONE) 100 mg, oral, Daily    amLODIPine (NORVASC) 2.5 mg, oral, Daily    apixaban (ELIQUIS) 2.5 mg, oral, 2 times daily    atorvastatin (LIPITOR) 80 mg, oral, Nightly    BD Skye 2nd Gen Pen Needle 32 gauge x 5/32\" needle USE ONCE DAILY AS DIRECTED    blood pressure test kit-wrist kit 1 kit, miscellaneous, Daily    clopidogrel (PLAVIX) 75 mg, oral, Daily    cyanocobalamin (VITAMIN B-12) 500 mcg, oral, Daily    dexAMETHasone (DECADRON) 6 mg, oral, Daily    docusate sodium (COLACE) 100 mg, oral, 2 times daily    ferrous sulfate 65 mg, oral, Every other day, Do not crush, chew, or split.    furosemide (LASIX) 20 mg, oral, 4 times weekly, Take on Monday, Wednesday, and Friday    insulin degludec (TRESIBA FLEXTOUCH U-100) 7 Units, subcutaneous, Nightly    isosorbide mononitrate ER (IMDUR) 60 mg, oral, Daily    lancets (Accu-Chek Fastclix Lancet Drum) misc 1 Lancet, miscellaneous, 3 times daily    losartan (COZAAR) 50 mg, oral, 2 times daily, Hold if the top number for blood pressure is less then 130    metoprolol succinate XL (Toprol-XL) 25 mg 24 hr tablet 1/2 tablet by mouth daily    nitroglycerin (NITROSTAT) 0.4 mg, sublingual, Every 5 min PRN    ondansetron (ZOFRAN) 4 mg, oral, Every 8 hours PRN    OneTouch Ultra Test strip 3 times daily        Allergies   Allergen Reactions    Tramadol Other     \"psychadelic Lights\"        Objective   Vitals:    08/15/24 1603 " "  BP: 124/70   Pulse: 76   Resp: 16      Vitals:    08/15/24 1603   Weight: 49.8 kg (109 lb 12.8 oz)      Body mass index is 20.08 kg/m².   Physical Exam  Constitutional:       Appearance: Normal appearance. She is normal weight.   HENT:      Head: Normocephalic and atraumatic.   Neck:      Thyroid: No thyroid mass, thyromegaly or thyroid tenderness.   Cardiovascular:      Rate and Rhythm: Normal rate and regular rhythm.      Heart sounds: No murmur heard.     No gallop.   Pulmonary:      Effort: Pulmonary effort is normal.      Breath sounds: Normal breath sounds.   Abdominal:      Palpations: Abdomen is soft.      Comments: benign   Neurological:      General: No focal deficit present.      Mental Status: She is alert and oriented to person, place, and time.      Deep Tendon Reflexes: Reflexes are normal and symmetric.   Psychiatric:         Behavior: Behavior is cooperative.         Labs:  Lab Results   Component Value Date    HGBA1C 8.5 (H) 06/30/2024    TSH 5.10 (H) 02/16/2024    FREET4 1.33 02/16/2024      No results found for: \"PR1\", \"THYROIDPAB\", \"TSI\"     Assessment/Plan   Assessment & Plan  Type 2 diabetes mellitus without complication, with long-term current use of insulin (Multi)    Sugars are decent  Continue current insulin dose  Discussed A1c and goals at her age, she is doing just fine  Adult onset hypothyroidism  Stable mild subclinical hypo, due for recheck next time  Primary hypertension  Bp excellent  Pure hypercholesterolemia    Stable on statin  Follow up in 6 months      Electronically signed by:  Martha Domingo MD 08/15/24 4:09 PM              "

## 2024-08-15 NOTE — ASSESSMENT & PLAN NOTE
Sugars are decent  Continue current insulin dose  Discussed A1c and goals at her age, she is doing just fine

## 2024-08-16 ENCOUNTER — HOME CARE VISIT (OUTPATIENT)
Dept: HOME HEALTH SERVICES | Facility: HOME HEALTH | Age: 89
End: 2024-08-16
Payer: MEDICARE

## 2024-08-20 ENCOUNTER — OFFICE VISIT (OUTPATIENT)
Dept: CARDIOLOGY | Facility: HOSPITAL | Age: 89
End: 2024-08-20
Payer: MEDICARE

## 2024-08-20 VITALS
HEART RATE: 57 BPM | WEIGHT: 109 LBS | SYSTOLIC BLOOD PRESSURE: 145 MMHG | DIASTOLIC BLOOD PRESSURE: 75 MMHG | BODY MASS INDEX: 20.06 KG/M2 | HEIGHT: 62 IN

## 2024-08-20 DIAGNOSIS — I48.20 CHRONIC ATRIAL FIBRILLATION (MULTI): Primary | ICD-10-CM

## 2024-08-20 PROCEDURE — 99214 OFFICE O/P EST MOD 30 MIN: CPT | Performed by: INTERNAL MEDICINE

## 2024-08-20 PROCEDURE — 1157F ADVNC CARE PLAN IN RCRD: CPT | Performed by: INTERNAL MEDICINE

## 2024-08-20 PROCEDURE — 3077F SYST BP >= 140 MM HG: CPT | Performed by: INTERNAL MEDICINE

## 2024-08-20 PROCEDURE — 1159F MED LIST DOCD IN RCRD: CPT | Performed by: INTERNAL MEDICINE

## 2024-08-20 PROCEDURE — 1036F TOBACCO NON-USER: CPT | Performed by: INTERNAL MEDICINE

## 2024-08-20 PROCEDURE — 93010 ELECTROCARDIOGRAM REPORT: CPT | Performed by: INTERNAL MEDICINE

## 2024-08-20 PROCEDURE — 1160F RVW MEDS BY RX/DR IN RCRD: CPT | Performed by: INTERNAL MEDICINE

## 2024-08-20 PROCEDURE — 3078F DIAST BP <80 MM HG: CPT | Performed by: INTERNAL MEDICINE

## 2024-08-20 PROCEDURE — 93005 ELECTROCARDIOGRAM TRACING: CPT | Performed by: INTERNAL MEDICINE

## 2024-08-20 NOTE — PROGRESS NOTES
Subjective:  Shona returns for a routine 4-month follow-up.  She unfortunately had a protracted hospitalization around July 4 for COVID.  She appears to have recuperated from this.    She comes back today for routine follow-up.  She denies any angina at a reasonably good activity level.  Her breathing remains reasonably good, and she is not having any problematic peripheral edema.  She  denies any palpitations to suggest recurrent atrial fibrillation.  She denies any bleeding problems despite good compliance with her clopidogrel and reduced dose Eliquis.  She is accompanied again as usual by one of her supportive daughters who confirms that she is doing well.    Objective:  General: Alert but somewhat frail 89-year-old female.  HEENT: Unchanged.  Lungs: Clear without crackles.  Cardiac: Normal S1 and S2 with 2 or 6 systolic murmur.  Abdomen: Nontender with normal bowel sounds.  Extremities: No edema.  Skin: No rash.  Neuro: Grossly intact without focal motor deficit.    EKG: Sinus bradycardia.  Nonspecific ST and T wave abnormality which is unchanged.    Impression/plan:  Shona is doing quite nicely at this time.  She remains free of any problematic angina, so will not embark on any additional cardiovascular testing at this time.  I also did not think we needed to make any medication changes.  Obviously at her advanced age, we will want to remain very conservative with management of her coronary disease.    Her blood pressure remains under good control on her current regimen.    She remains in sinus rhythm and remains appropriately anticoagulated with reduced dose Eliquis and also remains on amiodarone.    Her lipid panels have historically been in reasonably good order, so I did not think we needed to repeat this testing at this time.  They did not need any prescriptions renewed.  I will see her back for routine follow-up in 4 months, but she and her daughter know to call for any intercurrent concerns.    Patient  instructions:    Continue current medications unchanged.    Return to clinic in 4 months.    Call for any intercurrent problems.

## 2024-08-21 ENCOUNTER — HOME CARE VISIT (OUTPATIENT)
Dept: HOME HEALTH SERVICES | Facility: HOME HEALTH | Age: 89
End: 2024-08-21
Payer: MEDICARE

## 2024-08-21 LAB
ATRIAL RATE: 57 BPM
P AXIS: 80 DEGREES
P OFFSET: 205 MS
P ONSET: 139 MS
PR INTERVAL: 162 MS
Q ONSET: 220 MS
QRS COUNT: 10 BEATS
QRS DURATION: 110 MS
QT INTERVAL: 450 MS
QTC CALCULATION(BAZETT): 438 MS
QTC FREDERICIA: 442 MS
R AXIS: 50 DEGREES
T AXIS: 114 DEGREES
T OFFSET: 445 MS
VENTRICULAR RATE: 57 BPM

## 2024-08-22 SDOH — HEALTH STABILITY: PHYSICAL HEALTH: EXERCISE TYPE: HEP

## 2024-08-22 ASSESSMENT — BALANCE ASSESSMENTS
ARISING SCORE: 2
NUDGED SCORE: 0
SITTING DOWN: 2 - SAFE, SMOOTH MOTION
BALANCE SCORE: 14
EYES CLOSED AT MAXIMUM POSITION NUDGED: 1 - STEADY
TURNING 360 DEGREES STEPS: 1 - CONTINUOUS STEPS
ATTEMPTS TO ARISE: 2 - ABLE TO RISE, ONE ATTEMPT
ARISES: 2 - ABLE WITHOUT USING ARMS
IMMEDIATE STANDING BALANCE FIRST 5 SECONDS: 2 - STEADY WITHOUT WALKER OR OTHER SUPPORT
NUDGED: 0 - BEGINS TO FALL
STANDING BALANCE: 2 - NARROW STANCE WITHOUT SUPPORT
SITTING BALANCE: 1 - STEADY, SAFE

## 2024-08-22 ASSESSMENT — GAIT ASSESSMENTS
STEP CONTINUITY: 1 - STEPS APPEAR CONTINUOUS
TRUNK SCORE: 1
PATH: 2 - STRAIGHT WITHOUT WALKING AID
TRUNK: 1 - NO SWAY BUT FLEXION OF KNEES OR BACK OR SPREADS ARMS WHILE WALKING
PATH SCORE: 2
WALKING STANCE: 1 - HEELS ALMOST TOUCHING WHILE WALKING
BALANCE AND GAIT SCORE: 25
INITIATION OF GAIT IMMEDIATELY AFTER GO: 1 - NO HESITANCY
GAIT SCORE: 11
STEP SYMMETRY: 1 - RIGHT AND LEFT STEP LENGTH APPEAR EQUAL

## 2024-08-22 ASSESSMENT — ACTIVITIES OF DAILY LIVING (ADL)
OASIS_M1830: 01
AMBULATION_DISTANCE/DURATION_TOLERATED: WNL
HOME_HEALTH_OASIS: 01

## 2024-08-22 ASSESSMENT — ENCOUNTER SYMPTOMS
PERSON REPORTING PAIN: PATIENT
DENIES PAIN: 1

## 2024-09-05 DIAGNOSIS — Z79.4 TYPE 2 DIABETES MELLITUS WITHOUT COMPLICATION, WITH LONG-TERM CURRENT USE OF INSULIN (MULTI): Primary | ICD-10-CM

## 2024-09-05 DIAGNOSIS — E11.9 TYPE 2 DIABETES MELLITUS WITHOUT COMPLICATION, WITH LONG-TERM CURRENT USE OF INSULIN (MULTI): Primary | ICD-10-CM

## 2024-09-05 RX ORDER — INSULIN DEGLUDEC 100 U/ML
7 INJECTION, SOLUTION SUBCUTANEOUS NIGHTLY
Qty: 6.3 ML | Refills: 3 | Status: SHIPPED | OUTPATIENT
Start: 2024-09-05 | End: 2025-09-05

## 2024-09-05 RX ORDER — PEN NEEDLE, DIABETIC 32GX 5/32"
1 NEEDLE, DISPOSABLE MISCELLANEOUS DAILY
Qty: 100 EACH | Refills: 3 | Status: SHIPPED | OUTPATIENT
Start: 2024-09-05 | End: 2025-09-05

## 2024-09-05 RX ORDER — BLOOD SUGAR DIAGNOSTIC
1 STRIP MISCELLANEOUS 3 TIMES DAILY
Qty: 300 STRIP | Refills: 3 | Status: SHIPPED | OUTPATIENT
Start: 2024-09-05 | End: 2025-09-05

## 2024-09-05 RX ORDER — LANCETS
1 EACH MISCELLANEOUS 3 TIMES DAILY
Qty: 300 EACH | Refills: 3 | Status: SHIPPED | OUTPATIENT
Start: 2024-09-05 | End: 2025-09-05

## 2024-09-05 RX ORDER — INSULIN GLARGINE 100 [IU]/ML
7 INJECTION, SOLUTION SUBCUTANEOUS DAILY
Qty: 6.3 ML | Refills: 3 | Status: SHIPPED | OUTPATIENT
Start: 2024-09-05 | End: 2025-09-05

## 2024-10-09 ENCOUNTER — OFFICE VISIT (OUTPATIENT)
Dept: CARDIOLOGY | Facility: HOSPITAL | Age: 89
End: 2024-10-09
Payer: MEDICARE

## 2024-10-09 ENCOUNTER — HOSPITAL ENCOUNTER (OUTPATIENT)
Dept: CARDIOLOGY | Facility: HOSPITAL | Age: 89
Discharge: HOME | End: 2024-10-09
Payer: MEDICARE

## 2024-10-09 VITALS
SYSTOLIC BLOOD PRESSURE: 140 MMHG | DIASTOLIC BLOOD PRESSURE: 70 MMHG | HEART RATE: 56 BPM | HEIGHT: 62 IN | WEIGHT: 112.4 LBS | BODY MASS INDEX: 20.68 KG/M2

## 2024-10-09 DIAGNOSIS — I48.91 ATRIAL FIBRILLATION, UNSPECIFIED TYPE (MULTI): Primary | ICD-10-CM

## 2024-10-09 PROCEDURE — 3077F SYST BP >= 140 MM HG: CPT | Performed by: INTERNAL MEDICINE

## 2024-10-09 PROCEDURE — 99213 OFFICE O/P EST LOW 20 MIN: CPT | Performed by: INTERNAL MEDICINE

## 2024-10-09 PROCEDURE — 1160F RVW MEDS BY RX/DR IN RCRD: CPT | Performed by: INTERNAL MEDICINE

## 2024-10-09 PROCEDURE — 1159F MED LIST DOCD IN RCRD: CPT | Performed by: INTERNAL MEDICINE

## 2024-10-09 PROCEDURE — 1157F ADVNC CARE PLAN IN RCRD: CPT | Performed by: INTERNAL MEDICINE

## 2024-10-09 PROCEDURE — 1036F TOBACCO NON-USER: CPT | Performed by: INTERNAL MEDICINE

## 2024-10-09 PROCEDURE — 3078F DIAST BP <80 MM HG: CPT | Performed by: INTERNAL MEDICINE

## 2024-10-09 NOTE — PROGRESS NOTES
Subjective:  Patient returns for a routine 2-month follow-up.  I was pleased to see that she recently turned 89 years old.  She is doing quite nicely despite her past cardiovascular history as outlined in her office note of 10/2023.  She is accompanied as usual by her supportive daughter.  She is actually getting around even better than she had been previously.  She denies any chest pain, palpitations or problematic peripheral edema.  She has not had any hospitalizations and her medications remain unchanged.    Objective:  General: Alert, vibrant but frail elderly female.  HEENT: Unchanged.  Lungs: Clear.  Cardiac: Unchanged over 6 systolic murmur.  Abdomen: Nontender.  Extremities: Minimal ankle edema.    Impression/plan:  Shona generally remains quite clinically stable at this time.  She is not having any problematic symptomatology.  Her heart rate and blood pressure remain under good control, and she is maintaining normal sinus rhythm.  Given her clinical stability, I will see her back in 6 months, but she and her daughter know to call for any intercurrent concerns.  They did not need any prescriptions renewed.    Patient instructions:    Continue current medications unchanged.    Return to clinic in 6 months.    Call for any intercurrent concerns.

## 2024-11-07 ENCOUNTER — LAB (OUTPATIENT)
Dept: LAB | Facility: LAB | Age: 89
End: 2024-11-07
Payer: MEDICARE

## 2024-11-07 DIAGNOSIS — I50.20 UNSPECIFIED SYSTOLIC (CONGESTIVE) HEART FAILURE: Primary | ICD-10-CM

## 2024-11-07 DIAGNOSIS — Z79.4 LONG TERM (CURRENT) USE OF INSULIN (MULTI): ICD-10-CM

## 2024-11-07 DIAGNOSIS — I50.23 ACUTE ON CHRONIC HFREF (HEART FAILURE WITH REDUCED EJECTION FRACTION): ICD-10-CM

## 2024-11-07 DIAGNOSIS — G93.41 METABOLIC ENCEPHALOPATHY: ICD-10-CM

## 2024-11-07 DIAGNOSIS — E11.29 TYPE 2 DIABETES MELLITUS WITH OTHER DIABETIC KIDNEY COMPLICATION: ICD-10-CM

## 2024-11-07 LAB
ALBUMIN SERPL BCP-MCNC: 4 G/DL (ref 3.4–5)
ALP SERPL-CCNC: 109 U/L (ref 33–136)
ALT SERPL W P-5'-P-CCNC: 17 U/L (ref 7–45)
ANION GAP SERPL CALC-SCNC: 10 MMOL/L (ref 10–20)
AST SERPL W P-5'-P-CCNC: 19 U/L (ref 9–39)
BASOPHILS # BLD AUTO: 0.03 X10*3/UL (ref 0–0.1)
BASOPHILS NFR BLD AUTO: 0.5 %
BILIRUB SERPL-MCNC: 0.6 MG/DL (ref 0–1.2)
BUN SERPL-MCNC: 41 MG/DL (ref 6–23)
CALCIUM SERPL-MCNC: 9.6 MG/DL (ref 8.6–10.3)
CHLORIDE SERPL-SCNC: 106 MMOL/L (ref 98–107)
CO2 SERPL-SCNC: 29 MMOL/L (ref 21–32)
CREAT SERPL-MCNC: 1.52 MG/DL (ref 0.5–1.05)
EGFRCR SERPLBLD CKD-EPI 2021: 31 ML/MIN/1.73M*2
EOSINOPHIL # BLD AUTO: 0.24 X10*3/UL (ref 0–0.4)
EOSINOPHIL NFR BLD AUTO: 3.9 %
ERYTHROCYTE [DISTWIDTH] IN BLOOD BY AUTOMATED COUNT: 12.7 % (ref 11.5–14.5)
GLUCOSE SERPL-MCNC: 181 MG/DL (ref 74–99)
HCT VFR BLD AUTO: 34.2 % (ref 36–46)
HGB BLD-MCNC: 11.1 G/DL (ref 12–16)
IMM GRANULOCYTES # BLD AUTO: 0.02 X10*3/UL (ref 0–0.5)
IMM GRANULOCYTES NFR BLD AUTO: 0.3 % (ref 0–0.9)
LYMPHOCYTES # BLD AUTO: 1.65 X10*3/UL (ref 0.8–3)
LYMPHOCYTES NFR BLD AUTO: 26.7 %
MCH RBC QN AUTO: 30.9 PG (ref 26–34)
MCHC RBC AUTO-ENTMCNC: 32.5 G/DL (ref 32–36)
MCV RBC AUTO: 95 FL (ref 80–100)
MONOCYTES # BLD AUTO: 0.48 X10*3/UL (ref 0.05–0.8)
MONOCYTES NFR BLD AUTO: 7.8 %
NEUTROPHILS # BLD AUTO: 3.76 X10*3/UL (ref 1.6–5.5)
NEUTROPHILS NFR BLD AUTO: 60.8 %
NRBC BLD-RTO: 0 /100 WBCS (ref 0–0)
PLATELET # BLD AUTO: 227 X10*3/UL (ref 150–450)
POTASSIUM SERPL-SCNC: 4.5 MMOL/L (ref 3.5–5.3)
PROT SERPL-MCNC: 6.1 G/DL (ref 6.4–8.2)
RBC # BLD AUTO: 3.59 X10*6/UL (ref 4–5.2)
SODIUM SERPL-SCNC: 140 MMOL/L (ref 136–145)
TSH SERPL-ACNC: 4.7 MIU/L (ref 0.44–3.98)
WBC # BLD AUTO: 6.2 X10*3/UL (ref 4.4–11.3)

## 2024-11-07 PROCEDURE — 83036 HEMOGLOBIN GLYCOSYLATED A1C: CPT

## 2024-11-07 PROCEDURE — 36415 COLL VENOUS BLD VENIPUNCTURE: CPT

## 2024-11-07 PROCEDURE — 85025 COMPLETE CBC W/AUTO DIFF WBC: CPT

## 2024-11-07 PROCEDURE — 80053 COMPREHEN METABOLIC PANEL: CPT

## 2024-11-07 PROCEDURE — 84443 ASSAY THYROID STIM HORMONE: CPT

## 2024-11-08 LAB
EST. AVERAGE GLUCOSE BLD GHB EST-MCNC: 186 MG/DL
HBA1C MFR BLD: 8.1 %

## 2024-12-08 DIAGNOSIS — I48.20 CHRONIC ATRIAL FIBRILLATION (MULTI): ICD-10-CM

## 2024-12-09 RX ORDER — AMIODARONE HYDROCHLORIDE 100 MG/1
100 TABLET ORAL DAILY
Qty: 90 TABLET | Refills: 3 | Status: SHIPPED | OUTPATIENT
Start: 2024-12-09 | End: 2025-12-09

## 2024-12-11 RX ORDER — METOPROLOL SUCCINATE 25 MG/1
TABLET, EXTENDED RELEASE ORAL
Qty: 45 TABLET | Refills: 3 | Status: SHIPPED | OUTPATIENT
Start: 2024-12-11

## 2024-12-18 ENCOUNTER — APPOINTMENT (OUTPATIENT)
Dept: CARDIOLOGY | Facility: HOSPITAL | Age: 89
End: 2024-12-18
Payer: MEDICARE

## 2025-01-10 LAB
ATRIAL RATE: 54 BPM
P AXIS: 85 DEGREES
P OFFSET: 204 MS
P ONSET: 136 MS
PR INTERVAL: 162 MS
Q ONSET: 217 MS
QRS COUNT: 9 BEATS
QRS DURATION: 112 MS
QT INTERVAL: 458 MS
QTC CALCULATION(BAZETT): 434 MS
QTC FREDERICIA: 441 MS
R AXIS: 38 DEGREES
T AXIS: 61 DEGREES
T OFFSET: 446 MS
VENTRICULAR RATE: 54 BPM

## 2025-01-10 PROCEDURE — 93005 ELECTROCARDIOGRAM TRACING: CPT

## 2025-01-21 DIAGNOSIS — I20.89 STABLE ANGINA PECTORIS (CMS-HCC): Primary | ICD-10-CM

## 2025-01-21 RX ORDER — ISOSORBIDE MONONITRATE 60 MG/1
60 TABLET, EXTENDED RELEASE ORAL DAILY
Qty: 90 TABLET | Refills: 3 | Status: SHIPPED | OUTPATIENT
Start: 2025-01-21 | End: 2026-01-21

## 2025-01-27 DIAGNOSIS — E78.00 PURE HYPERCHOLESTEROLEMIA: Primary | ICD-10-CM

## 2025-01-27 RX ORDER — ATORVASTATIN CALCIUM 80 MG/1
80 TABLET, FILM COATED ORAL NIGHTLY
Qty: 90 TABLET | Refills: 3 | Status: SHIPPED | OUTPATIENT
Start: 2025-01-27 | End: 2026-01-27

## 2025-02-06 ENCOUNTER — HOSPITAL ENCOUNTER (EMERGENCY)
Facility: HOSPITAL | Age: OVER 89
Discharge: HOME | End: 2025-02-06
Attending: STUDENT IN AN ORGANIZED HEALTH CARE EDUCATION/TRAINING PROGRAM
Payer: MEDICARE

## 2025-02-06 ENCOUNTER — APPOINTMENT (OUTPATIENT)
Dept: RADIOLOGY | Facility: HOSPITAL | Age: OVER 89
End: 2025-02-06
Payer: MEDICARE

## 2025-02-06 ENCOUNTER — CLINICAL SUPPORT (OUTPATIENT)
Dept: EMERGENCY MEDICINE | Facility: HOSPITAL | Age: OVER 89
End: 2025-02-06
Payer: MEDICARE

## 2025-02-06 ENCOUNTER — TELEPHONE (OUTPATIENT)
Dept: CARDIOLOGY | Facility: CLINIC | Age: OVER 89
End: 2025-02-06
Payer: MEDICARE

## 2025-02-06 VITALS
DIASTOLIC BLOOD PRESSURE: 85 MMHG | BODY MASS INDEX: 18.4 KG/M2 | HEART RATE: 53 BPM | SYSTOLIC BLOOD PRESSURE: 161 MMHG | TEMPERATURE: 97.2 F | OXYGEN SATURATION: 100 % | RESPIRATION RATE: 13 BRPM | HEIGHT: 62 IN | WEIGHT: 100 LBS

## 2025-02-06 DIAGNOSIS — R07.9 CHEST PAIN, UNSPECIFIED TYPE: Primary | ICD-10-CM

## 2025-02-06 LAB
ALBUMIN SERPL BCP-MCNC: 4 G/DL (ref 3.4–5)
ALP SERPL-CCNC: 108 U/L (ref 33–136)
ALT SERPL W P-5'-P-CCNC: 18 U/L (ref 7–45)
ANION GAP SERPL CALC-SCNC: 11 MMOL/L (ref 10–20)
AST SERPL W P-5'-P-CCNC: 18 U/L (ref 9–39)
ATRIAL RATE: 54 BPM
ATRIAL RATE: 56 BPM
ATRIAL RATE: 62 BPM
BASOPHILS # BLD AUTO: 0.03 X10*3/UL (ref 0–0.1)
BASOPHILS NFR BLD AUTO: 0.6 %
BILIRUB SERPL-MCNC: 0.5 MG/DL (ref 0–1.2)
BNP SERPL-MCNC: 224 PG/ML (ref 0–99)
BUN SERPL-MCNC: 33 MG/DL (ref 6–23)
CALCIUM SERPL-MCNC: 9.9 MG/DL (ref 8.6–10.6)
CARDIAC TROPONIN I PNL SERPL HS: 24 NG/L (ref 0–34)
CARDIAC TROPONIN I PNL SERPL HS: 24 NG/L (ref 0–34)
CHLORIDE SERPL-SCNC: 105 MMOL/L (ref 98–107)
CO2 SERPL-SCNC: 26 MMOL/L (ref 21–32)
CREAT SERPL-MCNC: 1.72 MG/DL (ref 0.5–1.05)
EGFRCR SERPLBLD CKD-EPI 2021: 27 ML/MIN/1.73M*2
EOSINOPHIL # BLD AUTO: 0.15 X10*3/UL (ref 0–0.4)
EOSINOPHIL NFR BLD AUTO: 2.9 %
ERYTHROCYTE [DISTWIDTH] IN BLOOD BY AUTOMATED COUNT: 13.6 % (ref 11.5–14.5)
GLUCOSE SERPL-MCNC: 192 MG/DL (ref 74–99)
HCT VFR BLD AUTO: 32.9 % (ref 36–46)
HGB BLD-MCNC: 11.7 G/DL (ref 12–16)
HOLD SPECIMEN: NORMAL
IMM GRANULOCYTES # BLD AUTO: 0.02 X10*3/UL (ref 0–0.5)
IMM GRANULOCYTES NFR BLD AUTO: 0.4 % (ref 0–0.9)
LYMPHOCYTES # BLD AUTO: 1.38 X10*3/UL (ref 0.8–3)
LYMPHOCYTES NFR BLD AUTO: 26.3 %
MAGNESIUM SERPL-MCNC: 2.13 MG/DL (ref 1.6–2.4)
MCH RBC QN AUTO: 31.7 PG (ref 26–34)
MCHC RBC AUTO-ENTMCNC: 35.6 G/DL (ref 32–36)
MCV RBC AUTO: 89 FL (ref 80–100)
MONOCYTES # BLD AUTO: 0.45 X10*3/UL (ref 0.05–0.8)
MONOCYTES NFR BLD AUTO: 8.6 %
NEUTROPHILS # BLD AUTO: 3.22 X10*3/UL (ref 1.6–5.5)
NEUTROPHILS NFR BLD AUTO: 61.2 %
NRBC BLD-RTO: 0 /100 WBCS (ref 0–0)
P AXIS: 56 DEGREES
P AXIS: 58 DEGREES
P AXIS: 69 DEGREES
P OFFSET: 198 MS
P OFFSET: 200 MS
P OFFSET: 201 MS
P ONSET: 137 MS
P ONSET: 137 MS
P ONSET: 140 MS
PLATELET # BLD AUTO: 241 X10*3/UL (ref 150–450)
POTASSIUM SERPL-SCNC: 3.8 MMOL/L (ref 3.5–5.3)
PR INTERVAL: 158 MS
PR INTERVAL: 160 MS
PR INTERVAL: 166 MS
PROT SERPL-MCNC: 6.7 G/DL (ref 6.4–8.2)
Q ONSET: 217 MS
Q ONSET: 219 MS
Q ONSET: 220 MS
QRS COUNT: 10 BEATS
QRS COUNT: 10 BEATS
QRS COUNT: 9 BEATS
QRS DURATION: 108 MS
QRS DURATION: 112 MS
QRS DURATION: 112 MS
QT INTERVAL: 432 MS
QT INTERVAL: 452 MS
QT INTERVAL: 506 MS
QTC CALCULATION(BAZETT): 416 MS
QTC CALCULATION(BAZETT): 458 MS
QTC CALCULATION(BAZETT): 479 MS
QTC FREDERICIA: 422 MS
QTC FREDERICIA: 457 MS
QTC FREDERICIA: 488 MS
R AXIS: -13 DEGREES
R AXIS: -6 DEGREES
R AXIS: 11 DEGREES
RBC # BLD AUTO: 3.69 X10*6/UL (ref 4–5.2)
SODIUM SERPL-SCNC: 138 MMOL/L (ref 136–145)
T AXIS: 103 DEGREES
T AXIS: 132 DEGREES
T AXIS: 98 DEGREES
T OFFSET: 436 MS
T OFFSET: 445 MS
T OFFSET: 470 MS
VENTRICULAR RATE: 54 BPM
VENTRICULAR RATE: 56 BPM
VENTRICULAR RATE: 62 BPM
WBC # BLD AUTO: 5.3 X10*3/UL (ref 4.4–11.3)

## 2025-02-06 PROCEDURE — 2550000001 HC RX 255 CONTRASTS: Performed by: STUDENT IN AN ORGANIZED HEALTH CARE EDUCATION/TRAINING PROGRAM

## 2025-02-06 PROCEDURE — 71046 X-RAY EXAM CHEST 2 VIEWS: CPT

## 2025-02-06 PROCEDURE — 93005 ELECTROCARDIOGRAM TRACING: CPT

## 2025-02-06 PROCEDURE — 99285 EMERGENCY DEPT VISIT HI MDM: CPT | Mod: 25 | Performed by: STUDENT IN AN ORGANIZED HEALTH CARE EDUCATION/TRAINING PROGRAM

## 2025-02-06 PROCEDURE — 71275 CT ANGIOGRAPHY CHEST: CPT | Performed by: RADIOLOGY

## 2025-02-06 PROCEDURE — 71275 CT ANGIOGRAPHY CHEST: CPT

## 2025-02-06 PROCEDURE — 99285 EMERGENCY DEPT VISIT HI MDM: CPT | Performed by: STUDENT IN AN ORGANIZED HEALTH CARE EDUCATION/TRAINING PROGRAM

## 2025-02-06 PROCEDURE — 36415 COLL VENOUS BLD VENIPUNCTURE: CPT

## 2025-02-06 PROCEDURE — 80053 COMPREHEN METABOLIC PANEL: CPT

## 2025-02-06 PROCEDURE — 93010 ELECTROCARDIOGRAM REPORT: CPT | Performed by: STUDENT IN AN ORGANIZED HEALTH CARE EDUCATION/TRAINING PROGRAM

## 2025-02-06 PROCEDURE — 84484 ASSAY OF TROPONIN QUANT: CPT

## 2025-02-06 PROCEDURE — 71046 X-RAY EXAM CHEST 2 VIEWS: CPT | Performed by: RADIOLOGY

## 2025-02-06 PROCEDURE — 83880 ASSAY OF NATRIURETIC PEPTIDE: CPT

## 2025-02-06 PROCEDURE — 74174 CTA ABD&PLVS W/CONTRAST: CPT | Performed by: RADIOLOGY

## 2025-02-06 PROCEDURE — 83735 ASSAY OF MAGNESIUM: CPT

## 2025-02-06 PROCEDURE — 85025 COMPLETE CBC W/AUTO DIFF WBC: CPT

## 2025-02-06 PROCEDURE — 93010 ELECTROCARDIOGRAM REPORT: CPT

## 2025-02-06 RX ADMIN — IOHEXOL 80 ML: 350 INJECTION, SOLUTION INTRAVENOUS at 06:01

## 2025-02-06 ASSESSMENT — HEART SCORE
ECG: NORMAL
TROPONIN: LESS THAN OR EQUAL TO NORMAL LIMIT
RISK FACTORS: >2 RISK FACTORS OR HX OF ATHEROSCLEROTIC DISEASE
AGE: 65+
HISTORY: SLIGHTLY SUSPICIOUS
AGE: 65+
TROPONIN: LESS THAN OR EQUAL TO NORMAL LIMIT
ECG: NORMAL
HEART SCORE: 5
HISTORY: MODERATELY SUSPICIOUS
HEART SCORE: 4
RISK FACTORS: >2 RISK FACTORS OR HX OF ATHEROSCLEROTIC DISEASE

## 2025-02-06 ASSESSMENT — LIFESTYLE VARIABLES
EVER HAD A DRINK FIRST THING IN THE MORNING TO STEADY YOUR NERVES TO GET RID OF A HANGOVER: NO
TOTAL SCORE: 0
HAVE PEOPLE ANNOYED YOU BY CRITICIZING YOUR DRINKING: NO
HAVE YOU EVER FELT YOU SHOULD CUT DOWN ON YOUR DRINKING: NO
EVER FELT BAD OR GUILTY ABOUT YOUR DRINKING: NO

## 2025-02-06 ASSESSMENT — PAIN - FUNCTIONAL ASSESSMENT: PAIN_FUNCTIONAL_ASSESSMENT: 0-10

## 2025-02-06 ASSESSMENT — COLUMBIA-SUICIDE SEVERITY RATING SCALE - C-SSRS
1. IN THE PAST MONTH, HAVE YOU WISHED YOU WERE DEAD OR WISHED YOU COULD GO TO SLEEP AND NOT WAKE UP?: NO
2. HAVE YOU ACTUALLY HAD ANY THOUGHTS OF KILLING YOURSELF?: NO
6. HAVE YOU EVER DONE ANYTHING, STARTED TO DO ANYTHING, OR PREPARED TO DO ANYTHING TO END YOUR LIFE?: NO

## 2025-02-06 ASSESSMENT — PAIN DESCRIPTION - LOCATION: LOCATION: CHEST

## 2025-02-06 ASSESSMENT — PAIN SCALES - GENERAL: PAINLEVEL_OUTOF10: 5 - MODERATE PAIN

## 2025-02-06 NOTE — ED PROVIDER NOTES
CC: Chest Pain     History provided by: Patient and Family Member  Limitations to History: None    HPI:  Patient is a 89-year-old female with history of chronic systolic HF, type 2 diabetes, hypertension, hyperlipidemia, CKD who presents with chest pain.  Patient states she was lying down at home resting around 11:30 PM prior to arrival when she had a  midsternal chest pressure sensation.  She states that briefly radiated to her back.  She states it resolved after a few minutes without any intervention. She denies any associated palpitations, shortness of breath. She denies any recent illnesses, fevers, chills, nausea, vomiting. Denies history of aortic aneurysm or aortic dissection.  Daughter is at bedside who is a nurse who states patient is on amiodarone, Eliquis, Plavix, Lasix at home. Patient takes Lasix 3 times a week, has not taken it in the past few days, they deny any leg swelling, weight gain.  Patient currently denies any chest pain.    I reviewed PCP note from October 30, 2024 when patient was seen for routine physical.  I also reviewed echocardiogram from April 2022 when patient had moderately decreased LV systolic function of 35 to 40%, dilated left atrium, moderate mitral valve regurgitation and mildly elevated RVSP    External Records Reviewed:  I reviewed prior ED visits, Care Everywhere, discharge summaries and outpatient records as appropriate.   ???????????????????????????????????????????????????????????????  Triage Vitals:  T 36.6 °C (97.8 °F)  HR 54  BP (!) 177/102  RR 12  O2 100 % None (Room air)    Physical Exam  Vitals and nursing note reviewed.   Constitutional:       General: She is not in acute distress.     Appearance: Normal appearance.   HENT:      Head: Normocephalic and atraumatic.   Eyes:      Conjunctiva/sclera: Conjunctivae normal.   Cardiovascular:      Rate and Rhythm: Normal rate and regular rhythm.      Comments: No appreciable JVD, equal pulses in bilateral upper  extremities  Pulmonary:      Effort: Pulmonary effort is normal. No respiratory distress.      Breath sounds: Normal breath sounds.   Abdominal:      General: Abdomen is flat.      Palpations: Abdomen is soft.      Comments: Abdomen is soft, nontender to palpation, nondistended   Musculoskeletal:         General: Normal range of motion.      Cervical back: Normal range of motion and neck supple.      Comments: No lower extremity edema or calf tenderness bilaterally   Skin:     General: Skin is warm and dry.   Neurological:      General: No focal deficit present.      Mental Status: She is alert and oriented to person, place, and time. Mental status is at baseline.   Psychiatric:         Mood and Affect: Mood normal.         Behavior: Behavior normal.        ???????????????????????????????????????????????????????????????  ED Course/Treatment/Medical Decision Making  MDM:  Patient is a 89-year-old female who presents with chest pain.  Vital signs notable for hypertension, otherwise hemodynamically stable.  Multiple differential diagnoses considered including acute coronary syndrome, PE, DVT, aortic dissection, pneumonia, angina.  Given patient's age and comorbidities, I did have extensive discussion with patient and daughter.  Patient does make her own decisions and is a full code at this time.  I did discuss possible cardiac catheterization given moderate to high risk heart score and admission pending workup with patient and she has stated she declines invasive procedures, cardiac catheterization and follows with Dr. Mendiola of cardiology who states he knows she has coronary occlusion and CAD but is not a candidate for cardiac catheterization.  I also discussed possibility of aortic dissection, aortic aneurysm, and did obtain CT imaging however she does decline invasive treatment.  Patient is overall hemodynamically stable at this time, denies any syncopal episodes, numbness, tingling, known history of aortic  pathology, overall lower suspicion for acute dissection at this time warranting imaging.  I did consider giving aspirin or nitroglycerin however patient denies any current chest pain, will continue to monitor.  Labs, EKG, chest x-ray and troponin and BNP obtained.  Patient has no significant signs of fluid overload on examination.      ED Course:  ED Course as of 02/06/25 0745   u Feb 06, 2025   0120 EKG reviewed normal sinus rhythm rate 62, WV interval 158 ms,  ms, QTc 458 ms, no acute ST segment elevations or depressions []   0217 2 hours of chest pain that resolved around 1:36 AM; no chest pressure currently []   0616 CT angio chest abdomen pelvis  No obvious dissection noted on my review []   0718 CMP reviewed with slightly elevated creatinine of 1.72, BUN 33, troponin flat at 24, BNP slightly elevated at 224 however patient does not appear clinically fluid overloaded, CBC with hemoglobin 11.7 []   0719 Discussed with patient's cardiologist Dr. Mendiola, patient will be signed out pending final CT read and reevaluation for ultimate disposition,  she does have a moderate to high risk heart score []      ED Course User Index  [JH] Ton Chairez MD  [] Domonique Galloway,          Diagnoses as of 02/06/25 0745   Chest pain, unspecified type         EKG Interpretation:  See ED Course/Below:    Independent Interpretation of Studies:  I independently interpreted labs/imaging as stated in ED Course or below.    Differential diagnoses considered include but are not limited to: See MDM/Below:    Social Determinants Limiting Care:  None identified The following actions were taken to address these social determinants:      Discussion of Management with Other Providers: See MDM/Below:    Disposition:  Patient was signed out at 0700 pending completion of their work-up.  Please see the next provider's transition of care note for the remainder of the patient's care.       TRISTAN Go,  PGY-3    I reviewed the case with the attending ED physician. The attending ED physician agrees with the plan. Patient and/or patient´s representative was counseled regarding labs, imaging, likely diagnosis, and plan. All questions were answered.    Disclaimer: This note was dictated by speech recognition.  Attempt at proofreading was made to minimize errors.  Errors in transcription may be present.  Please call if questions.    Procedures ? Smart[employer] last updated 2/6/2025 7:45 AM          Domonique Galloway DO  Resident  02/06/25 0745       Ton Chairez MD  02/07/25 0822

## 2025-02-06 NOTE — DISCHARGE INSTRUCTIONS
Today you were seen evaluated for chest pain.  Workup here in the emergency department was largely reassuring and after discussion, we came to the agreement to follow-up with Dr. Mendiola outpatient for ongoing chest pain.  If you have any questions concerns, new or concerning symptoms, please return to the emergency department for further evaluation.  It was a pleasure to take care of you!

## 2025-02-06 NOTE — ED TRIAGE NOTES
Pt was brought by ems for complaints of back pain and chest pain/pressure. Pt is on blood thinners. That started at about 11:30 pm. Pmh of MI, bypass. Pt

## 2025-02-06 NOTE — PROGRESS NOTES
Patient has been identified as having an emergent need for administration of iodinated contrast for CT scan prior to result of laboratory studies OR despite known elevated GFR due to possibility of life and/or limb threatening pathology.    I acknowledge the risks and benefits of emergently proceeding with contrast administration including that, at present, it is the position of the [employer] that contrast induced nephropathy (CALVIN) is a rare but possible consequence. At this time the benefits of proceeding with contrast administration outweigh the risks.    Attempts will be made to mitigate possible CALVIN risk with IV fluid hydration if able.

## 2025-02-06 NOTE — PROGRESS NOTES
Emergency Medicine Transition of Care Note.    I received this patient during signout.  Please see Dr. Galloway's note for detailed H&P, labs and imaging.    In brief, Shona Montgomery is an 89 y.o. female presenting for Chest Pain.         ED Course as of 02/06/25 0937   Thu Feb 06, 2025   0120 EKG reviewed normal sinus rhythm rate 62, OR interval 158 ms,  ms, QTc 458 ms, no acute ST segment elevations or depressions []   0217 2 hours of chest pain that resolved around 1:36 AM; no chest pressure currently []   0616 CT angio chest abdomen pelvis  No obvious dissection noted on my review []   0718 CMP reviewed with slightly elevated creatinine of 1.72, BUN 33, troponin flat at 24, BNP slightly elevated at 224 however patient does not appear clinically fluid overloaded, CBC with hemoglobin 11.7 []   0719 Discussed with patient's cardiologist Dr. Mendiola, patient will be signed out pending final CT read and reevaluation for ultimate disposition,  she does have a moderate to high risk heart score []   0935 Patient signed out following complete laboratory and imaging workup and pending recommendations from patient cardiologist Dr. Mendiola as well as patient and family decision of admission versus disposition home with cardiology follow-up.  During my shift, Dr. Mendiola reached out recommending patient okay for discharge home with follow-up.  After shared decision making, family is in agreement and will take patient home with close return precautions and plan to follow-up with Dr. Mendiola.  Patient discharged in stable condition. [PASTORA]      ED Course User Index  [] Ton Chairez MD  [PASTORA] Taurus Li MD  [SA] Domonique Galloway,          Diagnoses as of 02/06/25 0937   Chest pain, unspecified type       Final diagnoses:   [R07.9] Chest pain, unspecified type           Procedure  Procedures    ** Please excuse any errors in grammar or translation related to this dictation. Voice recognition  software was utilized to prepare this document. **       Kartik Rodríguez MD  [facility] Emergency Medicine

## 2025-02-07 DIAGNOSIS — I21.4 NSTEMI (NON-ST ELEVATED MYOCARDIAL INFARCTION) (MULTI): Primary | ICD-10-CM

## 2025-02-07 RX ORDER — NITROGLYCERIN 0.4 MG/1
0.4 TABLET SUBLINGUAL EVERY 5 MIN PRN
Qty: 25 TABLET | Refills: 3 | Status: SHIPPED | OUTPATIENT
Start: 2025-02-07

## 2025-02-10 ENCOUNTER — OFFICE VISIT (OUTPATIENT)
Dept: CARDIOLOGY | Facility: CLINIC | Age: OVER 89
End: 2025-02-10
Payer: MEDICARE

## 2025-02-10 VITALS
OXYGEN SATURATION: 100 % | WEIGHT: 109.4 LBS | HEIGHT: 62 IN | BODY MASS INDEX: 20.13 KG/M2 | SYSTOLIC BLOOD PRESSURE: 150 MMHG | HEART RATE: 54 BPM | DIASTOLIC BLOOD PRESSURE: 70 MMHG

## 2025-02-10 DIAGNOSIS — I10 PRIMARY HYPERTENSION: ICD-10-CM

## 2025-02-10 DIAGNOSIS — I48.91 ATRIAL FIBRILLATION, UNSPECIFIED TYPE (MULTI): Primary | ICD-10-CM

## 2025-02-10 DIAGNOSIS — I48.20 CHRONIC ATRIAL FIBRILLATION (MULTI): ICD-10-CM

## 2025-02-10 PROCEDURE — 93005 ELECTROCARDIOGRAM TRACING: CPT | Performed by: INTERNAL MEDICINE

## 2025-02-10 PROCEDURE — 3078F DIAST BP <80 MM HG: CPT | Performed by: INTERNAL MEDICINE

## 2025-02-10 PROCEDURE — 99214 OFFICE O/P EST MOD 30 MIN: CPT | Mod: 25 | Performed by: INTERNAL MEDICINE

## 2025-02-10 PROCEDURE — 3077F SYST BP >= 140 MM HG: CPT | Performed by: INTERNAL MEDICINE

## 2025-02-10 PROCEDURE — 1157F ADVNC CARE PLAN IN RCRD: CPT | Performed by: INTERNAL MEDICINE

## 2025-02-10 PROCEDURE — 1159F MED LIST DOCD IN RCRD: CPT | Performed by: INTERNAL MEDICINE

## 2025-02-10 PROCEDURE — 99214 OFFICE O/P EST MOD 30 MIN: CPT | Performed by: INTERNAL MEDICINE

## 2025-02-10 PROCEDURE — 1036F TOBACCO NON-USER: CPT | Performed by: INTERNAL MEDICINE

## 2025-02-10 PROCEDURE — 1126F AMNT PAIN NOTED NONE PRSNT: CPT | Performed by: INTERNAL MEDICINE

## 2025-02-10 PROCEDURE — 1160F RVW MEDS BY RX/DR IN RCRD: CPT | Performed by: INTERNAL MEDICINE

## 2025-02-10 PROCEDURE — 93010 ELECTROCARDIOGRAM REPORT: CPT | Performed by: INTERNAL MEDICINE

## 2025-02-10 RX ORDER — LATANOPROST 50 UG/ML
1 SOLUTION/ DROPS OPHTHALMIC NIGHTLY
COMMUNITY

## 2025-02-10 RX ORDER — METOPROLOL SUCCINATE 25 MG/1
TABLET, EXTENDED RELEASE ORAL
Qty: 45 TABLET | Refills: 3 | Status: SHIPPED | OUTPATIENT
Start: 2025-02-10

## 2025-02-10 RX ORDER — AMIODARONE HYDROCHLORIDE 100 MG/1
100 TABLET ORAL DAILY
Qty: 90 TABLET | Refills: 3 | Status: SHIPPED | OUTPATIENT
Start: 2025-02-10 | End: 2026-02-10

## 2025-02-10 RX ORDER — LOSARTAN POTASSIUM 100 MG/1
50 TABLET ORAL 2 TIMES DAILY
Qty: 90 TABLET | Refills: 3 | Status: SHIPPED | OUTPATIENT
Start: 2025-02-10 | End: 2026-02-10

## 2025-02-10 RX ORDER — FUROSEMIDE 20 MG/1
20 TABLET ORAL
Qty: 16 TABLET | Refills: 1 | Status: SHIPPED | OUTPATIENT
Start: 2025-02-11 | End: 2025-04-12

## 2025-02-10 RX ORDER — CLOPIDOGREL BISULFATE 75 MG/1
75 TABLET ORAL DAILY
Qty: 90 TABLET | Refills: 3 | Status: SHIPPED | OUTPATIENT
Start: 2025-02-10

## 2025-02-10 RX ORDER — CLOPIDOGREL BISULFATE 75 MG/1
75 TABLET ORAL DAILY
COMMUNITY
End: 2025-02-10 | Stop reason: SDUPTHER

## 2025-02-10 RX ORDER — AMLODIPINE BESYLATE 2.5 MG/1
2.5 TABLET ORAL DAILY
Qty: 90 TABLET | Refills: 3 | Status: SHIPPED | OUTPATIENT
Start: 2025-02-10 | End: 2026-02-10

## 2025-02-10 ASSESSMENT — PAIN SCALES - GENERAL: PAINLEVEL_OUTOF10: 0-NO PAIN

## 2025-02-10 NOTE — PROGRESS NOTES
Subjective:  Patient returns for a routine 6-month follow-up.  She is a pleasant 89-year-old female with complex coronary disease status post remote CABG as outlined in her office note from 10/24/2023.    She also has had atrial fibrillation requiring amiodarone therapy.    She had a brief ER visit last week for some atypical chest pain.  Her troponins were negative, and she was discharged and told to follow-up with me.    She has not had any recurrent chest pain.  She continues to take her chronic medications compliantly and is tolerating them well.  She denies any bleeding problems despite good compliance with her Eliquis and clopidogrel.  She denies any recurrent palpitations on low-dose amiodarone.  She is accompanied as usual by her very supportive daughter.    Objective:  General: Alert, usual delightful elderly self.  HEENT: Unchanged.  Lungs: Generally clear.  Cardiac: Unchanged 2/6 systolic murmur.  Abdomen: Nontender with normal bowel sounds.  Extremities: No edema.  Skin: No rash.  Neuro: Grossly intact and unchanged.    EKG: Sinus bradycardia.  Nonspecific ST and T wave abnormality.    Impression/plan:  Shona generally remains clinically stable at this time.  In retrospect, I am not sure her episode of chest pain last week was necessarily cardiac in origin.  Given her very advanced age and her wishes, we will not embark on any additional cardiovascular testing at this time.    Her heart rate and blood pressure remain under good control.  She remains in sinus rhythm on amiodarone and remains appropriately anticoagulated.  Her lipid panels and historically been in reasonably good range on her current regimen, so we will continue these unchanged.  I took the liberty of renewing all of her medications for her.  I will plan on seeing her back in 6 months but she knows to call for any intercurrent problems before then.    Patient instructions:    Continue current medications unchanged.    Return to clinic in 6  months.    Call for any intercurrent problems.

## 2025-02-11 LAB
ATRIAL RATE: 54 BPM
P AXIS: 69 DEGREES
P OFFSET: 202 MS
P ONSET: 136 MS
PR INTERVAL: 164 MS
Q ONSET: 218 MS
QRS COUNT: 8 BEATS
QRS DURATION: 112 MS
QT INTERVAL: 438 MS
QTC CALCULATION(BAZETT): 415 MS
QTC FREDERICIA: 422 MS
R AXIS: 41 DEGREES
T AXIS: 113 DEGREES
T OFFSET: 437 MS
VENTRICULAR RATE: 54 BPM

## 2025-02-12 DIAGNOSIS — Z79.4 TYPE 2 DIABETES MELLITUS WITHOUT COMPLICATION, WITH LONG-TERM CURRENT USE OF INSULIN (MULTI): ICD-10-CM

## 2025-02-12 DIAGNOSIS — E11.9 TYPE 2 DIABETES MELLITUS WITHOUT COMPLICATION, WITH LONG-TERM CURRENT USE OF INSULIN (MULTI): ICD-10-CM

## 2025-02-12 RX ORDER — LANCETS
EACH MISCELLANEOUS
Qty: 300 EACH | Refills: 3 | Status: SHIPPED | OUTPATIENT
Start: 2025-02-12

## 2025-02-17 ENCOUNTER — APPOINTMENT (OUTPATIENT)
Dept: ENDOCRINOLOGY | Facility: CLINIC | Age: OVER 89
End: 2025-02-17
Payer: MEDICARE

## 2025-02-17 VITALS
HEART RATE: 65 BPM | RESPIRATION RATE: 16 BRPM | HEIGHT: 62 IN | DIASTOLIC BLOOD PRESSURE: 72 MMHG | BODY MASS INDEX: 19.73 KG/M2 | SYSTOLIC BLOOD PRESSURE: 134 MMHG | WEIGHT: 107.2 LBS

## 2025-02-17 DIAGNOSIS — E11.9 TYPE 2 DIABETES MELLITUS WITHOUT COMPLICATION, WITH LONG-TERM CURRENT USE OF INSULIN (MULTI): Primary | ICD-10-CM

## 2025-02-17 DIAGNOSIS — E03.8 ADULT ONSET HYPOTHYROIDISM: ICD-10-CM

## 2025-02-17 DIAGNOSIS — Z79.4 TYPE 2 DIABETES MELLITUS WITHOUT COMPLICATION, WITH LONG-TERM CURRENT USE OF INSULIN (MULTI): Primary | ICD-10-CM

## 2025-02-17 DIAGNOSIS — I10 PRIMARY HYPERTENSION: ICD-10-CM

## 2025-02-17 LAB — POC HEMOGLOBIN A1C: 7.3 % (ref 4.2–6.5)

## 2025-02-17 PROCEDURE — 1160F RVW MEDS BY RX/DR IN RCRD: CPT | Performed by: INTERNAL MEDICINE

## 2025-02-17 PROCEDURE — 83036 HEMOGLOBIN GLYCOSYLATED A1C: CPT | Performed by: INTERNAL MEDICINE

## 2025-02-17 PROCEDURE — 3075F SYST BP GE 130 - 139MM HG: CPT | Performed by: INTERNAL MEDICINE

## 2025-02-17 PROCEDURE — 1159F MED LIST DOCD IN RCRD: CPT | Performed by: INTERNAL MEDICINE

## 2025-02-17 PROCEDURE — 99214 OFFICE O/P EST MOD 30 MIN: CPT | Performed by: INTERNAL MEDICINE

## 2025-02-17 PROCEDURE — G2211 COMPLEX E/M VISIT ADD ON: HCPCS | Performed by: INTERNAL MEDICINE

## 2025-02-17 PROCEDURE — 1157F ADVNC CARE PLAN IN RCRD: CPT | Performed by: INTERNAL MEDICINE

## 2025-02-17 PROCEDURE — 3078F DIAST BP <80 MM HG: CPT | Performed by: INTERNAL MEDICINE

## 2025-02-17 PROCEDURE — 1036F TOBACCO NON-USER: CPT | Performed by: INTERNAL MEDICINE

## 2025-02-17 RX ORDER — INSULIN GLARGINE 100 [IU]/ML
INJECTION, SOLUTION SUBCUTANEOUS EVERY 24 HOURS
COMMUNITY

## 2025-02-17 ASSESSMENT — ENCOUNTER SYMPTOMS
VOMITING: 0
FEVER: 0
SHORTNESS OF BREATH: 0
CHILLS: 0
COUGH: 0
PALPITATIONS: 0
HEADACHES: 0
DIARRHEA: 0
FATIGUE: 0
NAUSEA: 0

## 2025-02-17 NOTE — PROGRESS NOTES
Endocrinology: Follow up visit  Subjective   Patient ID: Shona Montgomery is a 89 y.o. female who presents for Diabetes (Type 2 ), Hyperlipidemia, and Hypertension.    PCP: Elieser Marks MD    HPI  Since last visit 6 months ago in er for cp recently but eval ok  Feeling ok.   Numbers excellent in am but a little high in pm  No lows    Tresiba at 7 units  Hx elevated lipase, cri so meds are limited  Review of Systems   Constitutional:  Negative for chills, fatigue and fever.   Respiratory:  Negative for cough and shortness of breath.    Cardiovascular:  Negative for chest pain and palpitations.   Gastrointestinal:  Negative for diarrhea, nausea and vomiting.   Neurological:  Negative for headaches.       Patient Active Problem List   Diagnosis    NSTEMI (non-ST elevated myocardial infarction) (Multi)    Chronic atrial fibrillation (Multi)    Abnormal glucose    Acute renal failure (CMS-HCC)    Atherosclerotic heart disease of native coronary artery without angina pectoris    Renal/ureteral disease    Choledocholithiasis    Diabetes mellitus (Multi)    Dizziness    Edema    Hemangioma of skin and subcutaneous tissue    Hematuria    Hematuria, microscopic    Hyperkalemia    Hyperlipidemia    Hypertension    Lower extremity edema    Actinic keratosis    Other seborrheic keratosis    Skin changes due to chronic exposure to nonionizing radiation, unspecified    Personal history of other malignant neoplasm of skin    Polyuria    Primary osteoarthritis of first carpometacarpal joint of right hand    Psoriasis    Shortness of breath    Slac (scapholunate advanced collapse) of wrist, right    Tinea    Weakness    Adult onset hypothyroidism    Chest pain    Influenza A    Acute on chronic HFrEF (heart failure with reduced ejection fraction)    Acute on chronic congestive heart failure, unspecified heart failure type    Abdominal gas pain    Anemia    Calculus of kidney    Cellulitis of toe    Difficulty walking    Dislocation  "closed, shoulder    Disorder of rotator cuff    Essential hypertension, benign    Hammer toe of right foot    Pain in both feet    Longstanding persistent atrial fibrillation (Multi)    Nausea and vomiting    Onychomycosis    Polyneuropathy due to type 2 diabetes mellitus (Multi)    Stage 3 chronic kidney disease (Multi)    Status post hip replacement    COVID        Home Meds:  Current Outpatient Medications   Medication Instructions    amiodarone (PACERONE) 100 mg, oral, Daily    amLODIPine (NORVASC) 2.5 mg, oral, Daily    apixaban (ELIQUIS) 2.5 mg, oral, 2 times daily    atorvastatin (LIPITOR) 80 mg, oral, Nightly    Basaglar KwikPen U-100 Insulin 7 Units, subcutaneous, Daily, Take as directed per insulin instructions.    BD Skye 2nd Gen Pen Needle 32 gauge x 5/32\" needle 1 Pen needle, abdominal subcutaneous, Daily    blood pressure test kit-wrist kit 1 kit, miscellaneous, Daily    clopidogrel (PLAVIX) 75 mg, oral, Daily    cyanocobalamin (VITAMIN B-12) 500 mcg, Daily    docusate sodium (COLACE) 100 mg, 2 times daily    ferrous sulfate 65 mg, Every other day    furosemide (LASIX) 20 mg, oral, 4 times weekly, Take on Monday, Wednesday, and Friday    insulin degludec (TRESIBA FLEXTOUCH U-100) 7 Units, subcutaneous, Nightly    insulin glargine (Lantus U-100 Insulin) 100 unit/mL injection subcutaneous, Every 24 hours, Take as directed per insulin instructions.    isosorbide mononitrate ER (IMDUR) 60 mg, oral, Daily    lancets (Accu-Chek Fastclix Lancet Drum) misc USE 1 LANCET 3 TIMES A DAY    latanoprost (Xalatan) 0.005 % ophthalmic solution 1 drop, Nightly    losartan (COZAAR) 50 mg, oral, 2 times daily, Hold if the top number for blood pressure is less then 130    metoprolol succinate XL (Toprol-XL) 25 mg 24 hr tablet TAKE 1/2 TABLET BY MOUTH DAILY    mv-min/FA/vit K/lutein/zeaxant (PRESERVISION AREDS 2 PLUS MV ORAL) 1 tablet, Daily    nitroglycerin (NITROSTAT) 0.4 mg, sublingual, Every 5 min PRN    OneTouch Ultra " "Test strip 1 strip, miscellaneous, 3 times daily        Allergies   Allergen Reactions    Tramadol Other     \"psychadelic Lights\"        Objective   Vitals:    02/17/25 1214   BP: 134/72   Pulse: 65   Resp: 16      Vitals:    02/17/25 1214   Weight: 48.6 kg (107 lb 3.2 oz)      Body mass index is 19.61 kg/m².   Physical Exam  Constitutional:       Appearance: Normal appearance. She is normal weight.   HENT:      Head: Normocephalic and atraumatic.   Neck:      Thyroid: No thyroid mass, thyromegaly or thyroid tenderness.   Cardiovascular:      Rate and Rhythm: Normal rate and regular rhythm.      Heart sounds: No murmur heard.     No gallop.   Pulmonary:      Effort: Pulmonary effort is normal.      Breath sounds: Normal breath sounds.   Abdominal:      Palpations: Abdomen is soft.      Comments: benign   Neurological:      General: No focal deficit present.      Mental Status: She is alert and oriented to person, place, and time.      Deep Tendon Reflexes: Reflexes are normal and symmetric.   Psychiatric:         Behavior: Behavior is cooperative.         Labs:  Lab Results   Component Value Date    HGBA1C 8.1 (H) 11/07/2024    TSH 4.70 (H) 11/07/2024    FREET4 1.33 02/16/2024      No results found for: \"PR1\", \"THYROIDPAB\", \"TSI\"     Assessment/Plan   Assessment & Plan  Type 2 diabetes mellitus without complication, with long-term current use of insulin (Multi)    A1c today  Sugars decent, have discussed goals given age  Adult onset hypothyroidism    Tsh stable: no need to add t4 at this time  Primary hypertension    Bp excellent      Electronically signed by:  Martha Domingo MD 02/17/25 12:31 PM              "

## 2025-02-18 ENCOUNTER — APPOINTMENT (OUTPATIENT)
Dept: ENDOCRINOLOGY | Facility: CLINIC | Age: OVER 89
End: 2025-02-18
Payer: MEDICARE

## 2025-03-06 DIAGNOSIS — I10 PRIMARY HYPERTENSION: ICD-10-CM

## 2025-03-07 RX ORDER — FUROSEMIDE 20 MG/1
20 TABLET ORAL
Qty: 32 TABLET | Refills: 0 | Status: SHIPPED | OUTPATIENT
Start: 2025-03-08 | End: 2025-05-07

## 2025-03-22 ENCOUNTER — APPOINTMENT (OUTPATIENT)
Dept: RADIOLOGY | Facility: HOSPITAL | Age: OVER 89
End: 2025-03-22
Payer: MEDICARE

## 2025-03-22 ENCOUNTER — APPOINTMENT (OUTPATIENT)
Dept: CARDIOLOGY | Facility: HOSPITAL | Age: OVER 89
End: 2025-03-22
Payer: MEDICARE

## 2025-03-22 ENCOUNTER — HOSPITAL ENCOUNTER (EMERGENCY)
Facility: HOSPITAL | Age: OVER 89
Discharge: HOME | End: 2025-03-22
Attending: EMERGENCY MEDICINE
Payer: MEDICARE

## 2025-03-22 VITALS
TEMPERATURE: 97.8 F | DIASTOLIC BLOOD PRESSURE: 68 MMHG | HEIGHT: 63 IN | RESPIRATION RATE: 18 BRPM | OXYGEN SATURATION: 98 % | HEART RATE: 51 BPM | BODY MASS INDEX: 18.75 KG/M2 | WEIGHT: 105.8 LBS | SYSTOLIC BLOOD PRESSURE: 177 MMHG

## 2025-03-22 DIAGNOSIS — S69.91XA INJURY OF RIGHT WRIST, INITIAL ENCOUNTER: ICD-10-CM
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
## 2025-03-22 LAB
ALBUMIN SERPL BCP-MCNC: 3.7 G/DL (ref 3.4–5)
ALP SERPL-CCNC: 110 U/L (ref 33–136)
ALT SERPL W P-5'-P-CCNC: 18 U/L (ref 7–45)
ANION GAP SERPL CALC-SCNC: 11 MMOL/L (ref 10–20)
AST SERPL W P-5'-P-CCNC: 20 U/L (ref 9–39)
BASOPHILS # BLD AUTO: 0.03 X10*3/UL (ref 0–0.1)
BASOPHILS NFR BLD AUTO: 0.4 %
BILIRUB SERPL-MCNC: 0.7 MG/DL (ref 0–1.2)
BUN SERPL-MCNC: 31 MG/DL (ref 6–23)
CALCIUM SERPL-MCNC: 9.4 MG/DL (ref 8.6–10.3)
CHLORIDE SERPL-SCNC: 104 MMOL/L (ref 98–107)
CO2 SERPL-SCNC: 25 MMOL/L (ref 21–32)
CREAT SERPL-MCNC: 1.35 MG/DL (ref 0.5–1.05)
EGFRCR SERPLBLD CKD-EPI 2021: 36 ML/MIN/1.73M*2
EOSINOPHIL # BLD AUTO: 0.07 X10*3/UL (ref 0–0.4)
EOSINOPHIL NFR BLD AUTO: 1 %
ERYTHROCYTE [DISTWIDTH] IN BLOOD BY AUTOMATED COUNT: 13.6 % (ref 11.5–14.5)
GLUCOSE SERPL-MCNC: 203 MG/DL (ref 74–99)
HCT VFR BLD AUTO: 31.4 % (ref 36–46)
HGB BLD-MCNC: 10.1 G/DL (ref 12–16)
IMM GRANULOCYTES # BLD AUTO: 0.04 X10*3/UL (ref 0–0.5)
IMM GRANULOCYTES NFR BLD AUTO: 0.6 % (ref 0–0.9)
LYMPHOCYTES # BLD AUTO: 0.99 X10*3/UL (ref 0.8–3)
LYMPHOCYTES NFR BLD AUTO: 14.5 %
MAGNESIUM SERPL-MCNC: 2 MG/DL (ref 1.6–2.4)
MCH RBC QN AUTO: 31.1 PG (ref 26–34)
MCHC RBC AUTO-ENTMCNC: 32.2 G/DL (ref 32–36)
MCV RBC AUTO: 97 FL (ref 80–100)
MONOCYTES # BLD AUTO: 0.48 X10*3/UL (ref 0.05–0.8)
MONOCYTES NFR BLD AUTO: 7 %
NEUTROPHILS # BLD AUTO: 5.24 X10*3/UL (ref 1.6–5.5)
NEUTROPHILS NFR BLD AUTO: 76.5 %
NRBC BLD-RTO: 0 /100 WBCS (ref 0–0)
PLATELET # BLD AUTO: 197 X10*3/UL (ref 150–450)
POTASSIUM SERPL-SCNC: 3.7 MMOL/L (ref 3.5–5.3)
PROT SERPL-MCNC: 6.1 G/DL (ref 6.4–8.2)
RBC # BLD AUTO: 3.25 X10*6/UL (ref 4–5.2)
SODIUM SERPL-SCNC: 136 MMOL/L (ref 136–145)
WBC # BLD AUTO: 6.9 X10*3/UL (ref 4.4–11.3)

## 2025-03-22 PROCEDURE — 70450 CT HEAD/BRAIN W/O DYE: CPT

## 2025-03-22 PROCEDURE — 83735 ASSAY OF MAGNESIUM: CPT

## 2025-03-22 PROCEDURE — 73110 X-RAY EXAM OF WRIST: CPT | Mod: RT

## 2025-03-22 PROCEDURE — 72131 CT LUMBAR SPINE W/O DYE: CPT

## 2025-03-22 PROCEDURE — 36415 COLL VENOUS BLD VENIPUNCTURE: CPT

## 2025-03-22 PROCEDURE — 72128 CT CHEST SPINE W/O DYE: CPT

## 2025-03-22 PROCEDURE — 2550000001 HC RX 255 CONTRASTS: Performed by: EMERGENCY MEDICINE

## 2025-03-22 PROCEDURE — 29125 APPL SHORT ARM SPLINT STATIC: CPT | Mod: RT

## 2025-03-22 PROCEDURE — 76377 3D RENDER W/INTRP POSTPROCES: CPT

## 2025-03-22 PROCEDURE — 70450 CT HEAD/BRAIN W/O DYE: CPT | Performed by: RADIOLOGY

## 2025-03-22 PROCEDURE — 70486 CT MAXILLOFACIAL W/O DYE: CPT

## 2025-03-22 PROCEDURE — 73130 X-RAY EXAM OF HAND: CPT | Mod: RT

## 2025-03-22 PROCEDURE — 85025 COMPLETE CBC W/AUTO DIFF WBC: CPT

## 2025-03-22 PROCEDURE — 93005 ELECTROCARDIOGRAM TRACING: CPT

## 2025-03-22 PROCEDURE — 99285 EMERGENCY DEPT VISIT HI MDM: CPT | Mod: 25 | Performed by: EMERGENCY MEDICINE

## 2025-03-22 PROCEDURE — 72125 CT NECK SPINE W/O DYE: CPT

## 2025-03-22 PROCEDURE — 84075 ASSAY ALKALINE PHOSPHATASE: CPT

## 2025-03-22 PROCEDURE — 74177 CT ABD & PELVIS W/CONTRAST: CPT

## 2025-03-22 RX ORDER — LOSARTAN POTASSIUM 50 MG/1
50 TABLET ORAL ONCE
Status: DISCONTINUED | OUTPATIENT
Start: 2025-03-22 | End: 2025-03-22 | Stop reason: HOSPADM

## 2025-03-22 RX ORDER — AMLODIPINE BESYLATE 5 MG/1
2.5 TABLET ORAL ONCE
Status: DISCONTINUED | OUTPATIENT
Start: 2025-03-22 | End: 2025-03-22 | Stop reason: HOSPADM

## 2025-03-22 RX ORDER — AMIODARONE HYDROCHLORIDE 200 MG/1
100 TABLET ORAL ONCE
Status: DISCONTINUED | OUTPATIENT
Start: 2025-03-22 | End: 2025-03-22 | Stop reason: HOSPADM

## 2025-03-22 RX ADMIN — IOHEXOL 90 ML: 350 INJECTION, SOLUTION INTRAVENOUS at 10:10

## 2025-03-22 ASSESSMENT — PAIN DESCRIPTION - ONSET: ONSET: SUDDEN

## 2025-03-22 ASSESSMENT — PAIN - FUNCTIONAL ASSESSMENT: PAIN_FUNCTIONAL_ASSESSMENT: 0-10

## 2025-03-22 ASSESSMENT — PAIN DESCRIPTION - DESCRIPTORS: DESCRIPTORS: ACHING;SORE

## 2025-03-22 ASSESSMENT — PAIN DESCRIPTION - ORIENTATION: ORIENTATION: RIGHT

## 2025-03-22 ASSESSMENT — PAIN DESCRIPTION - FREQUENCY: FREQUENCY: CONSTANT/CONTINUOUS

## 2025-03-22 ASSESSMENT — PAIN SCALES - GENERAL: PAINLEVEL_OUTOF10: 3

## 2025-03-22 ASSESSMENT — PAIN DESCRIPTION - PROGRESSION: CLINICAL_PROGRESSION: NOT CHANGED

## 2025-03-22 ASSESSMENT — PAIN DESCRIPTION - LOCATION: LOCATION: WRIST

## 2025-03-22 ASSESSMENT — PAIN DESCRIPTION - PAIN TYPE: TYPE: ACUTE PAIN

## 2025-03-22 NOTE — ED PROVIDER NOTES
Emergency Department Provider Note        History of Present Illness     History provided by: Patient and Family Member  Limitations to History: None    HPI:  Patient is a 89-year-old with a past medical history of CHF, atrial fibrillation on Eliquis, hypertension, CAD s/p CABG, CKD presenting to the ED for fall.  Patient was accompanied by family who states that the patient was focusing on the fan when she took a misstep and fell onto her face.  Patient states she did not lose consciousness.  Patient denied any dizziness lightheadedness chest pain or shortness of breath prior to the fall.  Patient currently on Eliquis.  Patient is complaining of right wrist pain  Physical Exam   Triage vitals:  T 36.1 °C (96.9 °F)  HR 82  /77  RR 16  O2 96 % None (Room air)    Physical Exam  Constitutional:       Appearance: Normal appearance.   HENT:      Head: Normocephalic.      Comments: Superficial lacerations to the face  Eyes:      Extraocular Movements: Extraocular movements intact.      Pupils: Pupils are equal, round, and reactive to light.   Neck:      Comments: No CT or L-spine tenderness  Cardiovascular:      Rate and Rhythm: Normal rate.   Pulmonary:      Effort: Pulmonary effort is normal.   Abdominal:      General: Abdomen is flat.      Palpations: Abdomen is soft.      Tenderness: There is no abdominal tenderness.   Musculoskeletal:         General: Normal range of motion.      Cervical back: Normal range of motion.      Comments: Right wrist pain, limited range of movement due to pain   Skin:     General: Skin is warm.   Neurological:      Mental Status: She is alert. Mental status is at baseline.   Psychiatric:         Mood and Affect: Mood normal.         Behavior: Behavior normal.          Medical Decision Making & ED Course   Medical Decision Making:    Patient 89-year-old female presented to the ED with a fall.  Patient had a fall on Eliquis.  Family was in the room and states the patient was  focusing on the fan which took a misstep and fell onto her face patient did not lose consciousness family was in the room next-door and immediately came and got her.  Patient denied any dizziness lightheadedness chest pain or shortness of breath prior to the fall.  Due to the patient being on Eliquis will obtain CT imaging.  Will obtain labs and EKG however this is a mechanical fall low concern for syncope at this time.  Patient complaining of right wrist pain will obtain imaging.  Please see ED course further details    EKG Independent Interpretation: EKG interpreted by myself. Please see ED Course for full interpretation.        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  No acute displaced facial bone fracture seen.  CT C-spine and head negative for bleeds or fractures. [TS]   1156 Imaging shows no acute fractures or bleeds however incidental finding of a possible cyst on the pancreas.  Family made aware.  Patient labs shows no leukocytosis, hemoglobin around baseline patient is a creatinine 1.35 which seems to be around her baseline as well.  Patient was given her home medications for her hypertension [TS]   1157 Patient was discharged with wrist splint and follow-up to orthopedic surgery regarding scaphoid lunate dissociation  [TS]   1212 Spoke with orthopedic surgery regarding scapholunate dissociation.  Patient has no proximal forearm pain imaging needed at this time.  Per orthopedic surgery patient to be discharged with follow-up with wrist splint. [TS]      ED Course User Index  [TS] Lamar Adams MD         Diagnoses as of 03/22/25 1803   Fall, initial encounter   Injury of  right wrist, initial encounter          Disposition   As a result of the work-up, the patient was discharged home.  she was informed of her diagnosis and instructed to come back with any concerns or worsening of condition.  she and was agreeable to the plan as discussed above.  she was given the opportunity to ask questions.  All of the patient's questions were answered.    Procedures   Procedures    Patient seen and discussed with ED attending physician.    Lamar Adams MD  Emergency Medicine     Lamar Adams MD  Resident  03/22/25 0197

## 2025-03-22 NOTE — ED TRIAGE NOTES
PT fell last night and is on blood thinners hit head. Pt hit right wrist and having pain and bruising. Denies dizziness states she was walking with cane and fell.

## 2025-03-24 LAB
ATRIAL RATE: 56 BPM
P AXIS: 82 DEGREES
P OFFSET: 192 MS
P ONSET: 133 MS
PR INTERVAL: 152 MS
Q ONSET: 209 MS
QRS COUNT: 9 BEATS
QRS DURATION: 120 MS
QT INTERVAL: 436 MS
QTC CALCULATION(BAZETT): 420 MS
QTC FREDERICIA: 426 MS
R AXIS: -16 DEGREES
T AXIS: 94 DEGREES
T OFFSET: 427 MS
VENTRICULAR RATE: 56 BPM

## 2025-03-28 ENCOUNTER — OFFICE VISIT (OUTPATIENT)
Dept: ORTHOPEDIC SURGERY | Facility: HOSPITAL | Age: OVER 89
End: 2025-03-28
Payer: MEDICARE

## 2025-03-28 DIAGNOSIS — M19.031 ARTHRITIS OF WRIST, RIGHT: Primary | ICD-10-CM

## 2025-03-28 DIAGNOSIS — S69.91XA INJURY OF RIGHT WRIST, INITIAL ENCOUNTER: ICD-10-CM

## 2025-03-28 PROCEDURE — 1157F ADVNC CARE PLAN IN RCRD: CPT | Performed by: ORTHOPAEDIC SURGERY

## 2025-03-28 PROCEDURE — 99204 OFFICE O/P NEW MOD 45 MIN: CPT | Performed by: ORTHOPAEDIC SURGERY

## 2025-03-28 PROCEDURE — 1036F TOBACCO NON-USER: CPT | Performed by: ORTHOPAEDIC SURGERY

## 2025-03-28 PROCEDURE — 1160F RVW MEDS BY RX/DR IN RCRD: CPT | Performed by: ORTHOPAEDIC SURGERY

## 2025-03-28 PROCEDURE — 99214 OFFICE O/P EST MOD 30 MIN: CPT | Performed by: ORTHOPAEDIC SURGERY

## 2025-03-28 PROCEDURE — 1159F MED LIST DOCD IN RCRD: CPT | Performed by: ORTHOPAEDIC SURGERY

## 2025-03-28 NOTE — LETTER
days.  No other upper extremity concerns today.    She is diabetic on insulin.  She is not hypothyroid and does not smoke.  She is on Plavix.      REVIEW OF SYSTEMS       A 30-item multi-system Review Of Systems was obtained on today's intake form.  This was reviewed with the patient and is correct.  The pertinent positives and negatives are listed above.  The form has been scanned separately into the medical record.      PHYSICAL EXAM    Constitutional:    Appears younger than her stated age. Well-developed, thin female in no acute distress.  Psychiatric:         Pleasant normal mood and affect. Behavior is appropriate for the situation.   Head:                   Normocephalic.  Mild facial bruising, resolving.  Eyes:                    Pupils are equal and round.  Cardiovascular:  2+ radial and ulnar pulses. Fingers well-perfused.  Respiratory:        Effort normal. No respiratory distress. Speaking in complete sentences.  Neurologic:       Alert and oriented to person, place, and time.  Skin:                Skin is intact, warm and dry.  Hematologic / Lymphatic:    No lymphedema or lymphangitis.    Extremities / Musculoskeletal:                      Splint removed.  Skin of the right hand and wrist is intact with no erythema, ecchymosis, or diffuse swelling.  Moderate CMC shoulder sign.  Tender mildly at the CMC and more significantly at the STT.  Joint is stable.  No MP hyperextension collapse.  Good IP flexion extension pull-through.  Wrist extension 40 degrees and flexion 30 degrees.  Full pronation and supination.  Sensation intact to light touch in all distributions.  Capillary refill less than 2 seconds.  No significant crepitus.      IMAGING / LABS / EMGs           X-rays right hand from [facility] on March 22 were independently interpreted by me today and show no acute fracture or foreign body other than an external ring.  Significant STT osteoarthritis and more mild at the CMC as well as index and long MP  "joints.  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  Joints otherwise well-preserved.      Past Medical History:   Diagnosis Date   • Atherosclerotic heart disease of native coronary artery without angina pectoris 04/20/2022    Atherosclerotic heart disease of native coronary artery without angina pectoris   • Personal history of other diseases of the circulatory system 09/16/2013    History of hypertension       Medication Documentation Review Audit       Reviewed by Martha Domingo MD (Physician) on 02/17/25 at 1225      Medication Order Taking? Sig Documenting Provider Last Dose Status   amiodarone (Pacerone) 100 mg tablet [id number] Yes Take 1 tablet (100 mg) by mouth once daily. Mohsen Mendiola MD  Active   amLODIPine (Norvasc) 2.5 mg tablet [id number] Yes Take 1 tablet (2.5 mg) by mouth once daily. Mohsen Mendiola MD  Active   apixaban (Eliquis) 2.5 mg tablet [id number] Yes Take 1 tablet (2.5 mg) by mouth 2 times a day. Mohsen Mendiola MD  Active   atorvastatin (Lipitor) 80 mg tablet [id number] Yes Take 1 tablet (80 mg) by mouth once daily at bedtime. Mohsen Mendiola MD  Active   Basaglar KwikPen U-100 Insulin 100 unit/mL (3 mL) pen [id number] Yes Inject 7 Units under the skin once daily. Take as directed per insulin instructions. Martha Domingo MD  Active   BD Skye 2nd Gen Pen Needle 32 gauge x 5/32\" needle [id number] Yes 1 Pen needle by abdominal subcutaneous route once daily. Martha Domingo MD  Active   blood pressure test kit-wrist kit [id number] Yes 1 kit once daily. Bernice Maciel, APRN-CNP  Active   clopidogrel (Plavix) 75 mg tablet [id number] Yes Take 1 tablet (75 mg) by mouth once daily. Mohsen Mendiola MD  Active   cyanocobalamin (Vitamin B-12) 500 mcg tablet [id number] Yes Take 1 tablet (500 mcg) by mouth once daily. Historical Provider, MD  Active   docusate sodium (Colace) 100 mg capsule " [id number] Yes Take 1 capsule (100 mg) by mouth 2 times a day. Historical Provider, MD  Active   ferrous sulfate 325 (65 Fe) MG EC tablet [id number] Yes Take 65 mg by mouth every other day. Do not crush, chew, or split. Historical Provider, MD  Active   furosemide (Lasix) 20 mg tablet [id number] Yes Take 1 tablet (20 mg) by mouth 4 times a week. Take on Monday, Wednesday, and Friday Mohsen Mendiola MD  Active   insulin degludec (Tresiba FlexTouch U-100) 100 unit/mL (3 mL) injection [id number]  Inject 7 Units under the skin once daily at bedtime.   Patient not taking: Reported on 2/17/2025    Martha Domingo MD  Active   insulin glargine (Lantus U-100 Insulin) 100 unit/mL injection [id number]  Inject under the skin once every 24 hours. Take as directed per insulin instructions. Historical Provider, MD  Active   isosorbide mononitrate ER (Imdur) 60 mg 24 hr tablet [id number] Yes Take 1 tablet (60 mg) by mouth once daily. Mohsen Mendiola MD  Active     Discontinued 02/12/25 1726   lancets (Accu-Chek Fastclix Lancet Drum) misc [id number] Yes USE 1 LANCET 3 TIMES A DAY Martha Domingo MD  Active   latanoprost (Xalatan) 0.005 % ophthalmic solution [id number] Yes Administer 1 drop into both eyes once daily at bedtime. Historical Provider, MD  Active   losartan (Cozaar) 100 mg tablet [id number] Yes Take 0.5 tablets (50 mg) by mouth 2 times a day. Hold if the top number for blood pressure is less then 130 Mohsen Mendiola MD  Active   metoprolol succinate XL (Toprol-XL) 25 mg 24 hr tablet [id number] Yes TAKE 1/2 TABLET BY MOUTH DAILY Mohsen Mendiola MD  Active   mv-min/FA/vit K/lutein/zeaxant (PRESERVISION AREDS 2 PLUS MV ORAL) [id number] Yes Take 1 tablet by mouth once daily. Historical Provider, MD  Active   nitroglycerin (Nitrostat) 0.4 mg SL tablet [id number] Yes Place 1 tablet (0.4 mg) under the tongue every 5 minutes if needed for chest pain. Mohsen Mendiola MD  Active   OneTouch Ultra Test strip  "[id number] Yes 1 strip 3 times a day. Martha Domingo MD  Active                    Allergies   Allergen Reactions   • Tramadol Other     \"psychadelic Lights\"       Social History     Socioeconomic History   • Marital status:      Spouse name: Not on file   • Number of children: Not on file   • Years of education: Not on file   • Highest education level: Not on file   Occupational History   • Not on file   Tobacco Use   • Smoking status: Never   • Smokeless tobacco: Never   Substance and Sexual Activity   • Alcohol use: Not on file   • Drug use: Not on file   • Sexual activity: Not on file   Other Topics Concern   • Not on file   Social History Narrative   • Not on file     Social Drivers of Health     Financial Resource Strain: Low Risk  (7/1/2024)    Overall Financial Resource Strain (CARDIA)    • Difficulty of Paying Living Expenses: Not very hard   Food Insecurity: No Food Insecurity (12/22/2023)    Hunger Vital Sign    • Worried About Running Out of Food in the Last Year: Never true    • Ran Out of Food in the Last Year: Never true   Transportation Needs: No Transportation Needs (8/21/2024)    OASIS : Transportation    • Lack of Transportation (Medical): No    • Lack of Transportation (Non-Medical): No    • Patient Unable or Declines to Respond: No   Physical Activity: Inactive (12/22/2023)    Exercise Vital Sign    • Days of Exercise per Week: 0 days    • Minutes of Exercise per Session: 0 min   Stress: No Stress Concern Present (12/22/2023)    [employer] [place] [employer] - Occupational Stress Questionnaire    • Feeling of Stress : Not at all   Social Connections: Feeling Socially Integrated (8/21/2024)    OASIS : Social Isolation    • Frequency of experiencing loneliness or isolation: Never   Intimate Partner Violence: Not At Risk (12/22/2023)    Humiliation, Afraid, Rape, and Kick questionnaire    • Fear of Current or Ex-Partner: No    • Emotionally Abused: No    • Physically " Abused: No    • Sexually Abused: No   Housing Stability: Low Risk  (7/1/2024)    Housing Stability Vital Sign    • Unable to Pay for Housing in the Last Year: No    • Number of Places Lived in the Last Year: 1    • Unstable Housing in the Last Year: No       Past Surgical History:   Procedure Laterality Date   • CORONARY ARTERY BYPASS GRAFT  08/08/2016    CABG   • TOTAL HIP ARTHROPLASTY  08/28/2014    Total Hip Replacement         Electronically Signed      ASTON Alcazar MD      Orthopaedic Hand Surgery      [phone]

## 2025-03-28 NOTE — Clinical Note
March 31, 2025     Elieser Marks MD  [id number]  [address], [facility] [address]  [facility] [address]    Patient: Shona Montgomery   YOB: 1929   Date of Visit: 3/28/2025       Dear Dr. Elieser Marks MD:    Thank you for referring Shona Montgomery to me for evaluation. Below are my notes for this consultation.  If you have questions, please do not hesitate to call me. I look forward to following your patient along with you.       Sincerely,     Torin Alcazar MD      CC: Dino Pizarro MD  ______________________________________________________________________________________

## 2025-03-28 NOTE — PROGRESS NOTES
CHIEF COMPLAINT         Right wrist pain    ASSESSMENT + PLAN     ***        HISTORY OF PRESENT ILLNESS       Patient is a 89 y.o. ***-hand dominant female ***, who presents today for evaluation of ***      REVIEW OF SYSTEMS       A 30-item multi-system Review Of Systems was obtained on today's intake form.  This was reviewed with the patient and is correct.  The pertinent positives and negatives are listed above.  The form has been scanned separately into the medical record.      PHYSICAL EXAM    Constitutional:    Appears stated age. Well-developed and well-nourished ***.  Psychiatric:         Pleasant normal mood and affect. Behavior is appropriate for the situation.   Head:                   Normocephalic and atraumatic.  Eyes:                    Pupils are equal and round.  Cardiovascular:  2+ radial and ulnar pulses. Fingers well-perfused.  Respiratory:        Effort normal. No respiratory distress. Speaking in complete sentences.  Neurologic:       Alert and oriented to person, place, and time.  Skin:                Skin is intact, warm and dry.  Hematologic / Lymphatic:    No lymphedema or lymphangitis.    Extremities / Musculoskeletal:                      ***      IMAGING / LABS / EMGs           ***      Past Medical History:   Diagnosis Date    Atherosclerotic heart disease of native coronary artery without angina pectoris 04/20/2022    Atherosclerotic heart disease of native coronary artery without angina pectoris    Personal history of other diseases of the circulatory system 09/16/2013    History of hypertension       Medication Documentation Review Audit       Reviewed by Martha Domingo MD (Physician) on 02/17/25 at 1225      Medication Order Taking? Sig Documenting Provider Last Dose Status   amiodarone (Pacerone) 100 mg tablet [id number] Yes Take 1 tablet (100 mg) by mouth once daily. Mohsen Mendiola MD  Active   amLODIPine (Norvasc) 2.5 mg tablet [id number] Yes Take 1 tablet (2.5 mg) by mouth  "once daily. Mohsen Mendiola MD  Active   apixaban (Eliquis) 2.5 mg tablet [id number] Yes Take 1 tablet (2.5 mg) by mouth 2 times a day. Mohsen Mendiola MD  Active   atorvastatin (Lipitor) 80 mg tablet [id number] Yes Take 1 tablet (80 mg) by mouth once daily at bedtime. Mohsen Mendiola MD  Active   Basaglar KwikPen U-100 Insulin 100 unit/mL (3 mL) pen [id number] Yes Inject 7 Units under the skin once daily. Take as directed per insulin instructions. Martha Domingo MD  Active   BD Skye 2nd Gen Pen Needle 32 gauge x 5/32\" needle [id number] Yes 1 Pen needle by abdominal subcutaneous route once daily. Martha Domingo MD  Active   blood pressure test kit-wrist kit [id number] Yes 1 kit once daily. Bernice Maciel, APRN-CNP  Active   clopidogrel (Plavix) 75 mg tablet [id number] Yes Take 1 tablet (75 mg) by mouth once daily. Mohsen Mendiola MD  Active   cyanocobalamin (Vitamin B-12) 500 mcg tablet [id number] Yes Take 1 tablet (500 mcg) by mouth once daily. Historical Provider, MD  Active   docusate sodium (Colace) 100 mg capsule [id number] Yes Take 1 capsule (100 mg) by mouth 2 times a day. Historical Provider, MD  Active   ferrous sulfate 325 (65 Fe) MG EC tablet [id number] Yes Take 65 mg by mouth every other day. Do not crush, chew, or split. Historical Provider, MD  Active   furosemide (Lasix) 20 mg tablet [id number] Yes Take 1 tablet (20 mg) by mouth 4 times a week. Take on Monday, Wednesday, and Friday Mohsen Mendiola MD  Active   insulin degludec (Tresiba FlexTouch U-100) 100 unit/mL (3 mL) injection [id number]  Inject 7 Units under the skin once daily at bedtime.   Patient not taking: Reported on 2/17/2025    Martha Domingo MD  Active   insulin glargine (Lantus U-100 Insulin) 100 unit/mL injection [id number]  Inject under the skin once every 24 hours. Take as directed per insulin instructions. Historical Provider, MD  Active   isosorbide mononitrate ER (Imdur) 60 mg 24 hr tablet [id number] Yes Take 1 " "tablet (60 mg) by mouth once daily. Mohsen Mendiola MD  Active     Discontinued 02/12/25 1726   lancets (Accu-Chek Fastclix Lancet Drum) misc [id number] Yes USE 1 LANCET 3 TIMES A DAY Martha Domingo MD  Active   latanoprost (Xalatan) 0.005 % ophthalmic solution [id number] Yes Administer 1 drop into both eyes once daily at bedtime. Historical Provider, MD  Active   losartan (Cozaar) 100 mg tablet [id number] Yes Take 0.5 tablets (50 mg) by mouth 2 times a day. Hold if the top number for blood pressure is less then 130 Mohsen Mendiola MD  Active   metoprolol succinate XL (Toprol-XL) 25 mg 24 hr tablet [id number] Yes TAKE 1/2 TABLET BY MOUTH DAILY Mohsen Mendiola MD  Active   mv-min/FA/vit K/lutein/zeaxant (PRESERVISION AREDS 2 PLUS MV ORAL) [id number] Yes Take 1 tablet by mouth once daily. Historical Provider, MD  Active   nitroglycerin (Nitrostat) 0.4 mg SL tablet [id number] Yes Place 1 tablet (0.4 mg) under the tongue every 5 minutes if needed for chest pain. Mohsen Mendiola MD  Active   OneTouch Ultra Test strip [id number] Yes 1 strip 3 times a day. Martha Domingo MD  Active                    Allergies   Allergen Reactions    Tramadol Other     \"psychadelic Lights\"       Social History     Socioeconomic History    Marital status:      Spouse name: Not on file    Number of children: Not on file    Years of education: Not on file    Highest education level: Not on file   Occupational History    Not on file   Tobacco Use    Smoking status: Never    Smokeless tobacco: Never   Substance and Sexual Activity    Alcohol use: Not on file    Drug use: Not on file    Sexual activity: Not on file   Other Topics Concern    Not on file   Social History Narrative    Not on file     Social Drivers of Health     Financial Resource Strain: Low Risk  (7/1/2024)    Overall Financial Resource Strain (CARDIA)     Difficulty of Paying Living Expenses: Not very hard   Food Insecurity: No Food Insecurity " (12/22/2023)    Hunger Vital Sign     Worried About Running Out of Food in the Last Year: Never true     Ran Out of Food in the Last Year: Never true   Transportation Needs: No Transportation Needs (8/21/2024)    OASIS : Transportation     Lack of Transportation (Medical): No     Lack of Transportation (Non-Medical): No     Patient Unable or Declines to Respond: No   Physical Activity: Inactive (12/22/2023)    Exercise Vital Sign     Days of Exercise per Week: 0 days     Minutes of Exercise per Session: 0 min   Stress: No Stress Concern Present (12/22/2023)    [employer] - Occupational Stress Questionnaire     Feeling of Stress : Not at all   Social Connections: Feeling Socially Integrated (8/21/2024)    OASIS : Social Isolation     Frequency of experiencing loneliness or isolation: Never   Intimate Partner Violence: Not At Risk (12/22/2023)    Humiliation, Afraid, Rape, and Kick questionnaire     Fear of Current or Ex-Partner: No     Emotionally Abused: No     Physically Abused: No     Sexually Abused: No   Housing Stability: Low Risk  (7/1/2024)    Housing Stability Vital Sign     Unable to Pay for Housing in the Last Year: No     Number of Places Lived in the Last Year: 1     Unstable Housing in the Last Year: No       Past Surgical History:   Procedure Laterality Date    CORONARY ARTERY BYPASS GRAFT  08/08/2016    CABG    TOTAL HIP ARTHROPLASTY  08/28/2014    Total Hip Replacement         Electronically Signed      ASTON Alcazar MD      Orthopaedic Hand Surgery      [phone]   Concern    Not on file   Social History Narrative    Not on file     Social Drivers of Health     Financial Resource Strain: Low Risk  (7/1/2024)    Overall Financial Resource Strain (CARDIA)     Difficulty of Paying Living Expenses: Not very hard   Food Insecurity: No Food Insecurity (12/22/2023)    Hunger Vital Sign     Worried About Running Out of Food in the Last Year: Never true     Ran Out of Food in the Last Year: Never true   Transportation Needs: No Transportation Needs (8/21/2024)    OASIS : Transportation     Lack of Transportation (Medical): No     Lack of Transportation (Non-Medical): No     Patient Unable or Declines to Respond: No   Physical Activity: Inactive (12/22/2023)    Exercise Vital Sign     Days of Exercise per Week: 0 days     Minutes of Exercise per Session: 0 min   Stress: No Stress Concern Present (12/22/2023)    [employer] - Occupational Stress Questionnaire     Feeling of Stress : Not at all   Social Connections: Feeling Socially Integrated (8/21/2024)    OASIS : Social Isolation     Frequency of experiencing loneliness or isolation: Never   Intimate Partner Violence: Not At Risk (12/22/2023)    Humiliation, Afraid, Rape, and Kick questionnaire     Fear of Current or Ex-Partner: No     Emotionally Abused: No     Physically Abused: No     Sexually Abused: No   Housing Stability: Low Risk  (7/1/2024)    Housing Stability Vital Sign     Unable to Pay for Housing in the Last Year: No     Number of Places Lived in the Last Year: 1     Unstable Housing in the Last Year: No       Past Surgical History:   Procedure Laterality Date    CORONARY ARTERY BYPASS GRAFT  08/08/2016    CABG    TOTAL HIP ARTHROPLASTY  08/28/2014    Total Hip Replacement         Electronically Signed      ASTON Alcazar MD      Orthopaedic Hand Surgery      [phone]

## 2025-04-04 ENCOUNTER — TELEPHONE (OUTPATIENT)
Facility: CLINIC | Age: OVER 89
End: 2025-04-04
Payer: MEDICARE

## 2025-04-04 DIAGNOSIS — E11.9 TYPE 2 DIABETES MELLITUS WITHOUT COMPLICATION, WITH LONG-TERM CURRENT USE OF INSULIN: ICD-10-CM

## 2025-04-04 DIAGNOSIS — Z79.4 TYPE 2 DIABETES MELLITUS WITHOUT COMPLICATION, WITH LONG-TERM CURRENT USE OF INSULIN: ICD-10-CM

## 2025-04-04 RX ORDER — INSULIN DEGLUDEC 100 U/ML
7 INJECTION, SOLUTION SUBCUTANEOUS NIGHTLY
Qty: 6.3 ML | Refills: 3 | Status: SHIPPED | OUTPATIENT
Start: 2025-04-04 | End: 2026-04-04

## 2025-04-04 NOTE — TELEPHONE ENCOUNTER
Pt daughter called in states that they do not like the basaglar pen and tresiba or lantus are covered on pts insurance. Pt daughter prefers to go on tresiba as the pen was easier to use but lantus is cheaper. Is there a benefit for one over the other?     [facility]     Next ov 08/19/25

## 2025-04-13 DIAGNOSIS — I10 PRIMARY HYPERTENSION: ICD-10-CM

## 2025-04-13 PROBLEM — M19.031 ARTHRITIS OF WRIST, RIGHT: Status: ACTIVE | Noted: 2025-04-13

## 2025-04-14 RX ORDER — FUROSEMIDE 20 MG/1
20 TABLET ORAL 3 TIMES WEEKLY
Qty: 40 TABLET | Refills: 3 | Status: SHIPPED | OUTPATIENT
Start: 2025-04-14

## 2025-05-19 ENCOUNTER — APPOINTMENT (OUTPATIENT)
Dept: RADIOLOGY | Facility: HOSPITAL | Age: OVER 89
End: 2025-05-19
Payer: MEDICARE

## 2025-05-19 ENCOUNTER — HOSPITAL ENCOUNTER (EMERGENCY)
Facility: HOSPITAL | Age: OVER 89
Discharge: HOME | End: 2025-05-20
Attending: EMERGENCY MEDICINE
Payer: MEDICARE

## 2025-05-19 DIAGNOSIS — N28.9 RENAL INSUFFICIENCY: ICD-10-CM

## 2025-05-19 DIAGNOSIS — N23 RENAL COLIC: Primary | ICD-10-CM

## 2025-05-19 DIAGNOSIS — N21.0 BLADDER STONE: ICD-10-CM

## 2025-05-19 PROCEDURE — 83735 ASSAY OF MAGNESIUM: CPT | Performed by: PHYSICIAN ASSISTANT

## 2025-05-19 PROCEDURE — 36415 COLL VENOUS BLD VENIPUNCTURE: CPT | Performed by: PHYSICIAN ASSISTANT

## 2025-05-19 PROCEDURE — 85025 COMPLETE CBC W/AUTO DIFF WBC: CPT | Performed by: PHYSICIAN ASSISTANT

## 2025-05-19 PROCEDURE — 81001 URINALYSIS AUTO W/SCOPE: CPT | Performed by: PHYSICIAN ASSISTANT

## 2025-05-19 PROCEDURE — 84484 ASSAY OF TROPONIN QUANT: CPT | Performed by: PHYSICIAN ASSISTANT

## 2025-05-19 PROCEDURE — 83605 ASSAY OF LACTIC ACID: CPT | Performed by: PHYSICIAN ASSISTANT

## 2025-05-19 PROCEDURE — 99285 EMERGENCY DEPT VISIT HI MDM: CPT | Performed by: EMERGENCY MEDICINE

## 2025-05-19 PROCEDURE — 87086 URINE CULTURE/COLONY COUNT: CPT | Mod: AHULAB | Performed by: PHYSICIAN ASSISTANT

## 2025-05-19 PROCEDURE — 80053 COMPREHEN METABOLIC PANEL: CPT | Performed by: PHYSICIAN ASSISTANT

## 2025-05-19 PROCEDURE — 83690 ASSAY OF LIPASE: CPT | Performed by: PHYSICIAN ASSISTANT

## 2025-05-19 RX ORDER — ACETAMINOPHEN 325 MG/1
975 TABLET ORAL ONCE
Status: COMPLETED | OUTPATIENT
Start: 2025-05-19 | End: 2025-05-20

## 2025-05-19 RX ORDER — ONDANSETRON HYDROCHLORIDE 2 MG/ML
4 INJECTION, SOLUTION INTRAVENOUS ONCE
Status: COMPLETED | OUTPATIENT
Start: 2025-05-19 | End: 2025-05-20

## 2025-05-19 ASSESSMENT — PAIN DESCRIPTION - LOCATION: LOCATION: BACK

## 2025-05-19 ASSESSMENT — PAIN DESCRIPTION - PAIN TYPE: TYPE: ACUTE PAIN

## 2025-05-19 ASSESSMENT — COLUMBIA-SUICIDE SEVERITY RATING SCALE - C-SSRS
6. HAVE YOU EVER DONE ANYTHING, STARTED TO DO ANYTHING, OR PREPARED TO DO ANYTHING TO END YOUR LIFE?: NO
1. IN THE PAST MONTH, HAVE YOU WISHED YOU WERE DEAD OR WISHED YOU COULD GO TO SLEEP AND NOT WAKE UP?: NO
2. HAVE YOU ACTUALLY HAD ANY THOUGHTS OF KILLING YOURSELF?: NO

## 2025-05-19 ASSESSMENT — PAIN - FUNCTIONAL ASSESSMENT: PAIN_FUNCTIONAL_ASSESSMENT: 0-10

## 2025-05-19 ASSESSMENT — PAIN SCALES - GENERAL: PAINLEVEL_OUTOF10: 10 - WORST POSSIBLE PAIN

## 2025-05-20 ENCOUNTER — APPOINTMENT (OUTPATIENT)
Dept: CARDIOLOGY | Facility: HOSPITAL | Age: OVER 89
End: 2025-05-20
Payer: MEDICARE

## 2025-05-20 ENCOUNTER — APPOINTMENT (OUTPATIENT)
Dept: RADIOLOGY | Facility: HOSPITAL | Age: OVER 89
End: 2025-05-20
Payer: MEDICARE

## 2025-05-20 VITALS
TEMPERATURE: 97.2 F | HEART RATE: 50 BPM | BODY MASS INDEX: 18.61 KG/M2 | WEIGHT: 105 LBS | OXYGEN SATURATION: 95 % | SYSTOLIC BLOOD PRESSURE: 135 MMHG | DIASTOLIC BLOOD PRESSURE: 52 MMHG | HEIGHT: 63 IN | RESPIRATION RATE: 18 BRPM

## 2025-05-20 LAB
ALBUMIN SERPL BCP-MCNC: 4 G/DL (ref 3.4–5)
ALP SERPL-CCNC: 116 U/L (ref 33–136)
ALT SERPL W P-5'-P-CCNC: 22 U/L (ref 7–45)
ANION GAP SERPL CALC-SCNC: 15 MMOL/L (ref 10–20)
APPEARANCE UR: CLEAR
AST SERPL W P-5'-P-CCNC: 21 U/L (ref 9–39)
ATRIAL RATE: 61 BPM
BASOPHILS # BLD AUTO: 0.03 X10*3/UL (ref 0–0.1)
BASOPHILS NFR BLD AUTO: 0.4 %
BILIRUB SERPL-MCNC: 0.7 MG/DL (ref 0–1.2)
BILIRUB UR STRIP.AUTO-MCNC: NEGATIVE MG/DL
BUN SERPL-MCNC: 49 MG/DL (ref 6–23)
CALCIUM SERPL-MCNC: 9.7 MG/DL (ref 8.6–10.3)
CARDIAC TROPONIN I PNL SERPL HS: 28 NG/L (ref 0–13)
CARDIAC TROPONIN I PNL SERPL HS: 35 NG/L (ref 0–13)
CHLORIDE SERPL-SCNC: 104 MMOL/L (ref 98–107)
CO2 SERPL-SCNC: 21 MMOL/L (ref 21–32)
COLOR UR: COLORLESS
CREAT SERPL-MCNC: 1.75 MG/DL (ref 0.5–1.05)
EGFRCR SERPLBLD CKD-EPI 2021: 27 ML/MIN/1.73M*2
EOSINOPHIL # BLD AUTO: 0.26 X10*3/UL (ref 0–0.4)
EOSINOPHIL NFR BLD AUTO: 3.2 %
ERYTHROCYTE [DISTWIDTH] IN BLOOD BY AUTOMATED COUNT: 14.4 % (ref 11.5–14.5)
GLUCOSE SERPL-MCNC: 188 MG/DL (ref 74–99)
GLUCOSE UR STRIP.AUTO-MCNC: ABNORMAL MG/DL
HCT VFR BLD AUTO: 32.3 % (ref 36–46)
HGB BLD-MCNC: 10.7 G/DL (ref 12–16)
IMM GRANULOCYTES # BLD AUTO: 0.04 X10*3/UL (ref 0–0.5)
IMM GRANULOCYTES NFR BLD AUTO: 0.5 % (ref 0–0.9)
KETONES UR STRIP.AUTO-MCNC: NEGATIVE MG/DL
LACTATE SERPL-SCNC: 1 MMOL/L (ref 0.4–2)
LEUKOCYTE ESTERASE UR QL STRIP.AUTO: ABNORMAL
LIPASE SERPL-CCNC: 39 U/L (ref 9–82)
LYMPHOCYTES # BLD AUTO: 1.93 X10*3/UL (ref 0.8–3)
LYMPHOCYTES NFR BLD AUTO: 24 %
MAGNESIUM SERPL-MCNC: 2.11 MG/DL (ref 1.6–2.4)
MCH RBC QN AUTO: 30.8 PG (ref 26–34)
MCHC RBC AUTO-ENTMCNC: 33.1 G/DL (ref 32–36)
MCV RBC AUTO: 93 FL (ref 80–100)
MONOCYTES # BLD AUTO: 0.49 X10*3/UL (ref 0.05–0.8)
MONOCYTES NFR BLD AUTO: 6.1 %
NEUTROPHILS # BLD AUTO: 5.3 X10*3/UL (ref 1.6–5.5)
NEUTROPHILS NFR BLD AUTO: 65.8 %
NITRITE UR QL STRIP.AUTO: NEGATIVE
NRBC BLD-RTO: 0 /100 WBCS (ref 0–0)
P AXIS: 84 DEGREES
P OFFSET: 202 MS
P ONSET: 139 MS
PH UR STRIP.AUTO: 6.5 [PH]
PLATELET # BLD AUTO: 205 X10*3/UL (ref 150–450)
POTASSIUM SERPL-SCNC: 3.7 MMOL/L (ref 3.5–5.3)
PR INTERVAL: 154 MS
PROT SERPL-MCNC: 6.6 G/DL (ref 6.4–8.2)
PROT UR STRIP.AUTO-MCNC: ABNORMAL MG/DL
Q ONSET: 216 MS
QRS COUNT: 10 BEATS
QRS DURATION: 124 MS
QT INTERVAL: 450 MS
QTC CALCULATION(BAZETT): 453 MS
QTC FREDERICIA: 452 MS
R AXIS: 18 DEGREES
RBC # BLD AUTO: 3.47 X10*6/UL (ref 4–5.2)
RBC # UR STRIP.AUTO: ABNORMAL MG/DL
RBC #/AREA URNS AUTO: >20 /HPF
SODIUM SERPL-SCNC: 136 MMOL/L (ref 136–145)
SP GR UR STRIP.AUTO: 1.01
SQUAMOUS #/AREA URNS AUTO: ABNORMAL /HPF
T AXIS: 98 DEGREES
T OFFSET: 441 MS
UROBILINOGEN UR STRIP.AUTO-MCNC: NORMAL MG/DL
VENTRICULAR RATE: 61 BPM
WBC # BLD AUTO: 8.1 X10*3/UL (ref 4.4–11.3)
WBC #/AREA URNS AUTO: ABNORMAL /HPF

## 2025-05-20 PROCEDURE — 93005 ELECTROCARDIOGRAM TRACING: CPT

## 2025-05-20 PROCEDURE — 2500000004 HC RX 250 GENERAL PHARMACY W/ HCPCS (ALT 636 FOR OP/ED): Performed by: PHYSICIAN ASSISTANT

## 2025-05-20 PROCEDURE — 36415 COLL VENOUS BLD VENIPUNCTURE: CPT | Performed by: PHYSICIAN ASSISTANT

## 2025-05-20 PROCEDURE — 74176 CT ABD & PELVIS W/O CONTRAST: CPT | Performed by: RADIOLOGY

## 2025-05-20 PROCEDURE — 2500000001 HC RX 250 WO HCPCS SELF ADMINISTERED DRUGS (ALT 637 FOR MEDICARE OP): Performed by: PHYSICIAN ASSISTANT

## 2025-05-20 PROCEDURE — 84484 ASSAY OF TROPONIN QUANT: CPT | Performed by: PHYSICIAN ASSISTANT

## 2025-05-20 PROCEDURE — 74176 CT ABD & PELVIS W/O CONTRAST: CPT

## 2025-05-20 PROCEDURE — 96374 THER/PROPH/DIAG INJ IV PUSH: CPT

## 2025-05-20 RX ADMIN — ONDANSETRON 4 MG: 2 INJECTION, SOLUTION INTRAMUSCULAR; INTRAVENOUS at 00:05

## 2025-05-20 RX ADMIN — ACETAMINOPHEN 975 MG: 325 TABLET ORAL at 00:05

## 2025-05-20 ASSESSMENT — PAIN DESCRIPTION - LOCATION: LOCATION: ABDOMEN

## 2025-05-20 ASSESSMENT — PAIN SCALES - GENERAL: PAINLEVEL_OUTOF10: 0 - NO PAIN

## 2025-05-20 ASSESSMENT — PAIN DESCRIPTION - PROGRESSION: CLINICAL_PROGRESSION: GRADUALLY IMPROVING

## 2025-05-20 ASSESSMENT — PAIN - FUNCTIONAL ASSESSMENT: PAIN_FUNCTIONAL_ASSESSMENT: 0-10

## 2025-05-20 NOTE — ED TRIAGE NOTES
Pt arrives through triage with daughters. Pt has been experiencing flank pain and bilateral side pain. Pt endorses pain with urination. Hx of kidney stones, /77 in triage.

## 2025-05-20 NOTE — ED PROVIDER NOTES
HPI   Chief Complaint   Patient presents with    Back Pain    Female Dysuria       89-year-old female is quite uncomfortable with discomfort in the left side symptoms occurred over the past.  Given few hours history of kidney stones nausea without vomiting.              Patient History   Medical History[1]  Surgical History[2]  Family History[3]  Social History[4]    Physical Exam   ED Triage Vitals   Temperature Heart Rate Respirations BP   05/19/25 2330 05/19/25 2330 05/19/25 2330 05/19/25 2330   36.2 °C (97.2 °F) 63 18 (!) 195/77      Pulse Ox Temp src Heart Rate Source Patient Position   05/19/25 2330 -- 05/20/25 0052 --   98 %  Monitor       BP Location FiO2 (%)     -- --             Physical Exam  Vitals and nursing note reviewed.   Constitutional:       General: She is not in acute distress.     Appearance: Normal appearance. She is normal weight. She is not ill-appearing, toxic-appearing or diaphoretic.   HENT:      Head: Normocephalic and atraumatic.      Right Ear: External ear normal.      Left Ear: External ear normal.      Nose: Nose normal.      Mouth/Throat:      Mouth: Mucous membranes are moist.      Pharynx: No oropharyngeal exudate.   Eyes:      Extraocular Movements: Extraocular movements intact.      Conjunctiva/sclera: Conjunctivae normal.      Pupils: Pupils are equal, round, and reactive to light.   Cardiovascular:      Rate and Rhythm: Normal rate and regular rhythm.   Pulmonary:      Effort: Pulmonary effort is normal. No respiratory distress.      Breath sounds: No stridor.   Abdominal:      General: There is no distension.      Tenderness: There is no abdominal tenderness. There is left CVA tenderness. There is no guarding.      Hernia: No hernia is present.   Musculoskeletal:         General: No swelling or deformity.      Cervical back: Normal range of motion.   Skin:     General: Skin is warm.      Capillary Refill: Capillary refill takes less than 2 seconds.      Coloration: Skin is  not jaundiced.      Findings: No bruising or rash.   Neurological:      General: No focal deficit present.      Mental Status: She is alert and oriented to person, place, and time. Mental status is at baseline.   Psychiatric:         Mood and Affect: Mood normal.         Behavior: Behavior normal.         Thought Content: Thought content normal.         Judgment: Judgment normal.           ED Course & MDM   ED Course as of 05/20/25 0514 Tue May 20, 2025   0219 Leukocyte Esterase, Urine(!): 25 Veronica/uL [JF]      ED Course User Index  [JF] Garrett Culp PA-C         Diagnoses as of 05/20/25 0514   Renal colic   Bladder stone   Renal insufficiency                 No data recorded     Brandon Coma Scale Score: 15 (05/19/25 2330 : Omayra Pacheco RN)                           Medical Decision Making  Differential diagnosis of stone versus UTI versus pyelonephritis    TERRENCE seen but chronic  CT shows stone in the bladder and perinephric stranding so she likely just passed a stone.  She does not appear to have a UTI but will send for culture      Patient really examined after CT scan states she feels great with no complaints messaged her primary that she was here and referred to urology        Procedure  Procedures       [1]   Past Medical History:  Diagnosis Date    Atherosclerotic heart disease of native coronary artery without angina pectoris 04/20/2022    Atherosclerotic heart disease of native coronary artery without angina pectoris    Personal history of other diseases of the circulatory system 09/16/2013    History of hypertension   [2]   Past Surgical History:  Procedure Laterality Date    CORONARY ARTERY BYPASS GRAFT  08/08/2016    CABG    TOTAL HIP ARTHROPLASTY  08/28/2014    Total Hip Replacement   [3]   Family History  Problem Relation Name Age of Onset    No Known Problems Mother      No Known Problems Father     [4]   Social History  Tobacco Use    Smoking status: Never    Smokeless tobacco: Never    Substance Use Topics    Alcohol use: Not on file    Drug use: Not on file        Garrett Culp PA-C  05/20/25 0516

## 2025-05-22 LAB — BACTERIA UR CULT: NORMAL

## 2025-06-03 ENCOUNTER — APPOINTMENT (OUTPATIENT)
Dept: UROLOGY | Facility: CLINIC | Age: OVER 89
End: 2025-06-03
Payer: MEDICARE

## 2025-06-03 VITALS — BODY MASS INDEX: 18.6 KG/M2 | TEMPERATURE: 97.6 F | HEIGHT: 63 IN

## 2025-06-03 DIAGNOSIS — N21.0 BLADDER STONE: ICD-10-CM

## 2025-06-03 DIAGNOSIS — N20.0 NEPHROLITHIASIS: Primary | ICD-10-CM

## 2025-06-03 DIAGNOSIS — N23 RENAL COLIC: ICD-10-CM

## 2025-06-03 DIAGNOSIS — N28.9 RENAL INSUFFICIENCY: ICD-10-CM

## 2025-06-03 LAB
POC APPEARANCE, URINE: CLEAR
POC BILIRUBIN, URINE: NEGATIVE
POC BLOOD, URINE: ABNORMAL
POC COLOR, URINE: YELLOW
POC GLUCOSE, URINE: NEGATIVE MG/DL
POC KETONES, URINE: ABNORMAL MG/DL
POC LEUKOCYTES, URINE: ABNORMAL
POC NITRITE,URINE: NEGATIVE
POC PH, URINE: 5.5 PH
POC PROTEIN, URINE: ABNORMAL MG/DL
POC SPECIFIC GRAVITY, URINE: 1.02
POC UROBILINOGEN, URINE: 0.2 EU/DL

## 2025-06-03 PROCEDURE — 1159F MED LIST DOCD IN RCRD: CPT | Performed by: UROLOGY

## 2025-06-03 PROCEDURE — 81003 URINALYSIS AUTO W/O SCOPE: CPT | Performed by: UROLOGY

## 2025-06-03 PROCEDURE — 1126F AMNT PAIN NOTED NONE PRSNT: CPT | Performed by: UROLOGY

## 2025-06-03 PROCEDURE — G2211 COMPLEX E/M VISIT ADD ON: HCPCS | Performed by: UROLOGY

## 2025-06-03 PROCEDURE — 1036F TOBACCO NON-USER: CPT | Performed by: UROLOGY

## 2025-06-03 PROCEDURE — 99204 OFFICE O/P NEW MOD 45 MIN: CPT | Performed by: UROLOGY

## 2025-06-03 ASSESSMENT — PAIN SCALES - GENERAL: PAINLEVEL_OUTOF10: 0-NO PAIN

## 2025-06-03 NOTE — PATIENT INSTRUCTIONS
Supplement for stone prevention: Moonstone Stone Stopper  -Can purchase from [employer] or [facility]

## 2025-06-03 NOTE — PROGRESS NOTES
Subjective   Shona Montgomery is a 89 y.o. female with history of recurrent nephrolithiasis requiring surgical intervention and CAD; presenting as a new patient today for a follow up after recent ER visit. Patient presented to the ER on 5/20/2025 with complaints of left sided flank pain.        CT A&P was obtained which showed  a 6 mm calculus in the urinary bladder. Patient is accompanied today by her daughter who reports she visualized stone passage. Currently she is asymptomatic. Denies any recent gross hematuria, fevers, chills, urinary retention, intractable flank or abdominal pain, nausea or vomiting.    Daughter is primary historian.          Medical History[1]  Surgical History[2]  Family History[3]  Current Medications[4]  Allergies[5]  Social History     Socioeconomic History    Marital status:      Spouse name: Not on file    Number of children: Not on file    Years of education: Not on file    Highest education level: Not on file   Occupational History    Not on file   Tobacco Use    Smoking status: Never    Smokeless tobacco: Never   Substance and Sexual Activity    Alcohol use: Not on file    Drug use: Not on file    Sexual activity: Not on file   Other Topics Concern    Not on file   Social History Narrative    Not on file     Social Drivers of Health     Financial Resource Strain: Low Risk  (7/1/2024)    Overall Financial Resource Strain (CARDIA)     Difficulty of Paying Living Expenses: Not very hard   Food Insecurity: No Food Insecurity (12/22/2023)    Hunger Vital Sign     Worried About Running Out of Food in the Last Year: Never true     Ran Out of Food in the Last Year: Never true   Transportation Needs: No Transportation Needs (8/21/2024)    OASIS : Transportation     Lack of Transportation (Medical): No     Lack of Transportation (Non-Medical): No     Patient Unable or Declines to Respond: No   Physical Activity: Inactive (12/22/2023)    Exercise Vital Sign     Days of Exercise per  Week: 0 days     Minutes of Exercise per Session: 0 min   Stress: No Stress Concern Present (12/22/2023)    [employer] - Occupational Stress Questionnaire     Feeling of Stress : Not at all   Social Connections: Feeling Socially Integrated (8/21/2024)    OASIS : Social Isolation     Frequency of experiencing loneliness or isolation: Never   Intimate Partner Violence: Not At Risk (12/22/2023)    Humiliation, Afraid, Rape, and Kick questionnaire     Fear of Current or Ex-Partner: No     Emotionally Abused: No     Physically Abused: No     Sexually Abused: No   Housing Stability: Low Risk  (7/1/2024)    Housing Stability Vital Sign     Unable to Pay for Housing in the Last Year: No     Number of Places Lived in the Last Year: 1     Unstable Housing in the Last Year: No       Review of Systems  Pertinent items are noted in HPI.    Objective       Lab Review  Lab Results   Component Value Date    WBC 8.1 05/19/2025    RBC 3.47 (L) 05/19/2025    HGB 10.7 (L) 05/19/2025    HCT 32.3 (L) 05/19/2025     05/19/2025      Lab Results   Component Value Date    BUN 49 (H) 05/19/2025    CREATININE 1.75 (H) 05/19/2025        Urine analysis shows +leukocytes, +protein, +blood, +ketones       Assessment/Plan   Diagnoses and all orders for this visit:  Bladder stone  -     Referral to Urology  -     POCT UA Automated manually resulted  Renal colic  -     Referral to Urology  Renal insufficiency  -     Referral to Urology    History of recurrent nephrolithiasis       I personally and independently reviewed images of the CT A&P from 5/20/2025 which showed a a 6 mm calculus in the urinary bladder.     Patient's daughter reports she visualized stone passage.     Patient was educated on stone prevention lifestyle changes and dietary modifications which include increased water intake, citric acid supplementation in the form of lemon juice or lemonade, low oxalate diet, moderate protein intake, and  low sodium intake.     However, due to patients CHF she is on fluid restriction.    She also cannot supplement with lemon juice due to history of gastritis.     We discussed supplementation with moonstone, patient will discuss this with her PCP to see if she is able to tolerate it.       We will follow up virtually in 1 year with renal US to monitor stone presence and formation.       All questions were answered to the patient's satisfaction. Patient agrees with the plan and wishes to proceed. Follow-up will be scheduled appropriately.     E&M visit today is associated with current or anticipated ongoing medical care services related to a patient's single, serious condition or a complex condition.    Scribed for Dr. Sherman by Starr Layton. I , Dr Sherman, have personally reviewed and agreed with the information entered by the Virtual Scribe.          [1]   Past Medical History:  Diagnosis Date    Atherosclerotic heart disease of native coronary artery without angina pectoris 04/20/2022    Atherosclerotic heart disease of native coronary artery without angina pectoris    Personal history of other diseases of the circulatory system 09/16/2013    History of hypertension   [2]   Past Surgical History:  Procedure Laterality Date    CORONARY ARTERY BYPASS GRAFT  08/08/2016    CABG    TOTAL HIP ARTHROPLASTY  08/28/2014    Total Hip Replacement   [3]   Family History  Problem Relation Name Age of Onset    No Known Problems Mother      No Known Problems Father     [4]   Current Outpatient Medications   Medication Sig Dispense Refill    amiodarone (Pacerone) 100 mg tablet Take 1 tablet (100 mg) by mouth once daily. 90 tablet 3    amLODIPine (Norvasc) 2.5 mg tablet Take 1 tablet (2.5 mg) by mouth once daily. 90 tablet 3    apixaban (Eliquis) 2.5 mg tablet Take 1 tablet (2.5 mg) by mouth 2 times a day. 180 tablet 3    atorvastatin (Lipitor) 80 mg tablet Take 1 tablet (80 mg) by mouth once daily at bedtime. 90 tablet 3    Basaglar  "KwikPen U-100 Insulin 100 unit/mL (3 mL) pen Inject 7 Units under the skin once daily. Take as directed per insulin instructions. 6.3 mL 3    BD Skye 2nd Gen Pen Needle 32 gauge x 5/32\" needle 1 Pen needle by abdominal subcutaneous route once daily. 100 each 3    blood pressure test kit-wrist kit 1 kit once daily. 1 each 0    clopidogrel (Plavix) 75 mg tablet Take 1 tablet (75 mg) by mouth once daily. 90 tablet 3    cyanocobalamin (Vitamin B-12) 500 mcg tablet Take 1 tablet (500 mcg) by mouth once daily.      ferrous sulfate 325 (65 Fe) MG EC tablet Take 65 mg by mouth every other day. Do not crush, chew, or split.      furosemide (Lasix) 20 mg tablet TAKE 1 TABLET BY MOUTH DAILY ON MONDAY, WEDNESDAY AND FRIDAY 40 tablet 3    insulin degludec (Tresiba FlexTouch U-100) 100 unit/mL (3 mL) pen Inject 7 Units under the skin once daily at bedtime. 6.3 mL 3    insulin glargine (Lantus U-100 Insulin) 100 unit/mL injection Inject under the skin once every 24 hours. Take as directed per insulin instructions.      isosorbide mononitrate ER (Imdur) 60 mg 24 hr tablet Take 1 tablet (60 mg) by mouth once daily. 90 tablet 3    lancets (Accu-Chek Fastclix Lancet Drum) misc USE 1 LANCET 3 TIMES A  each 3    latanoprost (Xalatan) 0.005 % ophthalmic solution Administer 1 drop into both eyes once daily at bedtime.      losartan (Cozaar) 100 mg tablet Take 0.5 tablets (50 mg) by mouth 2 times a day. Hold if the top number for blood pressure is less then 130 90 tablet 3    metoprolol succinate XL (Toprol-XL) 25 mg 24 hr tablet TAKE 1/2 TABLET BY MOUTH DAILY 45 tablet 3    mv-min/FA/vit K/lutein/zeaxant (PRESERVISION AREDS 2 PLUS MV ORAL) Take 1 tablet by mouth once daily.      nitroglycerin (Nitrostat) 0.4 mg SL tablet Place 1 tablet (0.4 mg) under the tongue every 5 minutes if needed for chest pain. 25 tablet 3    OneTouch Ultra Test strip 1 strip 3 times a day. 300 strip 3    docusate sodium (Colace) 100 mg capsule Take 1 " "capsule (100 mg) by mouth 2 times a day.       No current facility-administered medications for this visit.   [5]   Allergies  Allergen Reactions    Tramadol Other     \"psychadelic Lights\"     "

## 2025-06-05 LAB — BACTERIA UR CULT: NORMAL

## 2025-07-15 ENCOUNTER — TELEPHONE (OUTPATIENT)
Dept: CARDIOLOGY | Facility: CLINIC | Age: OVER 89
End: 2025-07-15
Payer: MEDICARE

## 2025-08-11 ENCOUNTER — OFFICE VISIT (OUTPATIENT)
Dept: CARDIOLOGY | Facility: CLINIC | Age: OVER 89
End: 2025-08-11
Payer: MEDICARE

## 2025-08-11 VITALS
BODY MASS INDEX: 19.44 KG/M2 | SYSTOLIC BLOOD PRESSURE: 145 MMHG | HEART RATE: 56 BPM | WEIGHT: 109.7 LBS | HEIGHT: 63 IN | DIASTOLIC BLOOD PRESSURE: 75 MMHG | OXYGEN SATURATION: 97 %

## 2025-08-11 DIAGNOSIS — I20.89 STABLE ANGINA PECTORIS: ICD-10-CM

## 2025-08-11 DIAGNOSIS — I48.11 LONGSTANDING PERSISTENT ATRIAL FIBRILLATION (MULTI): Primary | ICD-10-CM

## 2025-08-11 DIAGNOSIS — I21.4 NSTEMI (NON-ST ELEVATED MYOCARDIAL INFARCTION) (MULTI): ICD-10-CM

## 2025-08-11 DIAGNOSIS — I10 PRIMARY HYPERTENSION: ICD-10-CM

## 2025-08-11 DIAGNOSIS — E78.00 PURE HYPERCHOLESTEROLEMIA: ICD-10-CM

## 2025-08-11 DIAGNOSIS — I50.23 ACUTE ON CHRONIC HFREF (HEART FAILURE WITH REDUCED EJECTION FRACTION): ICD-10-CM

## 2025-08-11 DIAGNOSIS — I48.91 ATRIAL FIBRILLATION, UNSPECIFIED TYPE (MULTI): ICD-10-CM

## 2025-08-11 DIAGNOSIS — I48.20 CHRONIC ATRIAL FIBRILLATION (MULTI): ICD-10-CM

## 2025-08-11 PROCEDURE — 3078F DIAST BP <80 MM HG: CPT | Performed by: INTERNAL MEDICINE

## 2025-08-11 PROCEDURE — 1126F AMNT PAIN NOTED NONE PRSNT: CPT | Performed by: INTERNAL MEDICINE

## 2025-08-11 PROCEDURE — 93005 ELECTROCARDIOGRAM TRACING: CPT | Performed by: INTERNAL MEDICINE

## 2025-08-11 PROCEDURE — 3077F SYST BP >= 140 MM HG: CPT | Performed by: INTERNAL MEDICINE

## 2025-08-11 PROCEDURE — 93010 ELECTROCARDIOGRAM REPORT: CPT | Performed by: INTERNAL MEDICINE

## 2025-08-11 PROCEDURE — 99212 OFFICE O/P EST SF 10 MIN: CPT | Mod: 25

## 2025-08-11 PROCEDURE — 1159F MED LIST DOCD IN RCRD: CPT | Performed by: INTERNAL MEDICINE

## 2025-08-11 PROCEDURE — 99214 OFFICE O/P EST MOD 30 MIN: CPT | Performed by: INTERNAL MEDICINE

## 2025-08-11 PROCEDURE — 1036F TOBACCO NON-USER: CPT | Performed by: INTERNAL MEDICINE

## 2025-08-11 RX ORDER — AMIODARONE HYDROCHLORIDE 100 MG/1
100 TABLET ORAL DAILY
Qty: 90 TABLET | Refills: 3 | Status: SHIPPED | OUTPATIENT
Start: 2025-08-11 | End: 2026-08-11

## 2025-08-11 RX ORDER — AMLODIPINE BESYLATE 2.5 MG/1
2.5 TABLET ORAL DAILY
Qty: 90 TABLET | Refills: 3 | Status: SHIPPED | OUTPATIENT
Start: 2025-08-11 | End: 2026-08-11

## 2025-08-11 RX ORDER — METOPROLOL SUCCINATE 25 MG/1
TABLET, EXTENDED RELEASE ORAL
Qty: 45 TABLET | Refills: 3 | Status: SHIPPED | OUTPATIENT
Start: 2025-08-11

## 2025-08-11 RX ORDER — ATORVASTATIN CALCIUM 80 MG/1
80 TABLET, FILM COATED ORAL NIGHTLY
Qty: 90 TABLET | Refills: 3 | Status: SHIPPED | OUTPATIENT
Start: 2025-08-11 | End: 2026-08-11

## 2025-08-11 RX ORDER — CLOPIDOGREL BISULFATE 75 MG/1
75 TABLET ORAL DAILY
Qty: 90 TABLET | Refills: 3 | Status: SHIPPED | OUTPATIENT
Start: 2025-08-11

## 2025-08-11 RX ORDER — ISOSORBIDE MONONITRATE 60 MG/1
60 TABLET, EXTENDED RELEASE ORAL DAILY
Qty: 90 TABLET | Refills: 3 | Status: SHIPPED | OUTPATIENT
Start: 2025-08-11 | End: 2026-08-11

## 2025-08-11 RX ORDER — NITROGLYCERIN 0.4 MG/1
0.4 TABLET SUBLINGUAL EVERY 5 MIN PRN
Qty: 25 TABLET | Refills: 3 | Status: SHIPPED | OUTPATIENT
Start: 2025-08-11

## 2025-08-11 RX ORDER — FUROSEMIDE 20 MG/1
20 TABLET ORAL 3 TIMES WEEKLY
Qty: 40 TABLET | Refills: 3 | Status: SHIPPED | OUTPATIENT
Start: 2025-08-11

## 2025-08-11 RX ORDER — LOSARTAN POTASSIUM 100 MG/1
50 TABLET ORAL 2 TIMES DAILY
Qty: 90 TABLET | Refills: 3 | Status: SHIPPED | OUTPATIENT
Start: 2025-08-11 | End: 2026-08-11

## 2025-08-11 ASSESSMENT — PAIN SCALES - GENERAL: PAINLEVEL_OUTOF10: 0-NO PAIN

## 2025-08-14 LAB
ATRIAL RATE: 56 BPM
P AXIS: 76 DEGREES
P OFFSET: 201 MS
P ONSET: 136 MS
PR INTERVAL: 162 MS
Q ONSET: 217 MS
QRS COUNT: 9 BEATS
QRS DURATION: 114 MS
QT INTERVAL: 452 MS
QTC CALCULATION(BAZETT): 436 MS
QTC FREDERICIA: 442 MS
R AXIS: 65 DEGREES
T AXIS: 91 DEGREES
T OFFSET: 443 MS
VENTRICULAR RATE: 56 BPM

## 2025-08-19 ENCOUNTER — APPOINTMENT (OUTPATIENT)
Dept: ENDOCRINOLOGY | Facility: CLINIC | Age: OVER 89
End: 2025-08-19
Payer: MEDICARE

## 2025-08-19 VITALS
SYSTOLIC BLOOD PRESSURE: 120 MMHG | DIASTOLIC BLOOD PRESSURE: 72 MMHG | HEIGHT: 63 IN | BODY MASS INDEX: 19.63 KG/M2 | WEIGHT: 110.8 LBS | HEART RATE: 58 BPM | RESPIRATION RATE: 16 BRPM

## 2025-08-19 DIAGNOSIS — Z79.4 TYPE 2 DIABETES MELLITUS WITHOUT COMPLICATION, WITH LONG-TERM CURRENT USE OF INSULIN: Primary | ICD-10-CM

## 2025-08-19 DIAGNOSIS — E03.8 ADULT ONSET HYPOTHYROIDISM: ICD-10-CM

## 2025-08-19 DIAGNOSIS — I10 PRIMARY HYPERTENSION: ICD-10-CM

## 2025-08-19 DIAGNOSIS — E11.9 TYPE 2 DIABETES MELLITUS WITHOUT COMPLICATION, WITH LONG-TERM CURRENT USE OF INSULIN: Primary | ICD-10-CM

## 2025-08-19 PROCEDURE — 3078F DIAST BP <80 MM HG: CPT | Performed by: INTERNAL MEDICINE

## 2025-08-19 PROCEDURE — 1160F RVW MEDS BY RX/DR IN RCRD: CPT | Performed by: INTERNAL MEDICINE

## 2025-08-19 PROCEDURE — 99214 OFFICE O/P EST MOD 30 MIN: CPT | Performed by: INTERNAL MEDICINE

## 2025-08-19 PROCEDURE — 3074F SYST BP LT 130 MM HG: CPT | Performed by: INTERNAL MEDICINE

## 2025-08-19 PROCEDURE — G2211 COMPLEX E/M VISIT ADD ON: HCPCS | Performed by: INTERNAL MEDICINE

## 2025-08-19 PROCEDURE — 1159F MED LIST DOCD IN RCRD: CPT | Performed by: INTERNAL MEDICINE

## 2025-08-19 PROCEDURE — 1036F TOBACCO NON-USER: CPT | Performed by: INTERNAL MEDICINE

## 2025-08-19 PROCEDURE — 1126F AMNT PAIN NOTED NONE PRSNT: CPT | Performed by: INTERNAL MEDICINE

## 2025-08-19 ASSESSMENT — ENCOUNTER SYMPTOMS
VOMITING: 0
FEVER: 0
CHILLS: 0
SHORTNESS OF BREATH: 0
DEPRESSION: 0
FATIGUE: 0
DIARRHEA: 0
OCCASIONAL FEELINGS OF UNSTEADINESS: 0
NAUSEA: 0
LOSS OF SENSATION IN FEET: 0
HEADACHES: 0
PALPITATIONS: 0
COUGH: 0

## 2025-08-19 ASSESSMENT — PATIENT HEALTH QUESTIONNAIRE - PHQ9
SUM OF ALL RESPONSES TO PHQ9 QUESTIONS 1 AND 2: 0
1. LITTLE INTEREST OR PLEASURE IN DOING THINGS: NOT AT ALL
2. FEELING DOWN, DEPRESSED OR HOPELESS: NOT AT ALL

## 2025-08-19 ASSESSMENT — PAIN SCALES - GENERAL: PAINLEVEL_OUTOF10: 0-NO PAIN

## 2025-09-05 DIAGNOSIS — Z79.4 TYPE 2 DIABETES MELLITUS WITHOUT COMPLICATION, WITH LONG-TERM CURRENT USE OF INSULIN: ICD-10-CM

## 2025-09-05 DIAGNOSIS — E11.9 TYPE 2 DIABETES MELLITUS WITHOUT COMPLICATION, WITH LONG-TERM CURRENT USE OF INSULIN: ICD-10-CM

## 2025-09-05 RX ORDER — BLOOD SUGAR DIAGNOSTIC
1 STRIP MISCELLANEOUS 3 TIMES DAILY
Qty: 300 STRIP | Refills: 3 | Status: SHIPPED | OUTPATIENT
Start: 2025-09-05 | End: 2026-09-05

## 2026-06-03 ENCOUNTER — APPOINTMENT (OUTPATIENT)
Dept: UROLOGY | Facility: CLINIC | Age: OVER 89
End: 2026-06-03
Payer: MEDICARE